# Patient Record
Sex: MALE | Race: WHITE | Employment: OTHER | URBAN - NONMETROPOLITAN AREA
[De-identification: names, ages, dates, MRNs, and addresses within clinical notes are randomized per-mention and may not be internally consistent; named-entity substitution may affect disease eponyms.]

---

## 2018-03-13 ENCOUNTER — OFFICE VISIT (OUTPATIENT)
Dept: INTERNAL MEDICINE | Age: 73
End: 2018-03-13
Payer: MEDICARE

## 2018-03-13 VITALS
SYSTOLIC BLOOD PRESSURE: 128 MMHG | WEIGHT: 243 LBS | RESPIRATION RATE: 18 BRPM | OXYGEN SATURATION: 95 % | DIASTOLIC BLOOD PRESSURE: 80 MMHG | HEIGHT: 72 IN | BODY MASS INDEX: 32.91 KG/M2 | HEART RATE: 72 BPM

## 2018-03-13 DIAGNOSIS — Z00.00 MEDICARE ANNUAL WELLNESS VISIT, SUBSEQUENT: ICD-10-CM

## 2018-03-13 DIAGNOSIS — R07.9 CHEST PAIN, UNSPECIFIED TYPE: ICD-10-CM

## 2018-03-13 DIAGNOSIS — I10 ESSENTIAL HYPERTENSION: Primary | ICD-10-CM

## 2018-03-13 DIAGNOSIS — Z99.89 OSA ON CPAP: ICD-10-CM

## 2018-03-13 DIAGNOSIS — I67.9 CEREBROVASCULAR DISEASE: ICD-10-CM

## 2018-03-13 DIAGNOSIS — R06.09 EXERTIONAL DYSPNEA: ICD-10-CM

## 2018-03-13 DIAGNOSIS — Z12.11 ENCOUNTER FOR SCREENING COLONOSCOPY: ICD-10-CM

## 2018-03-13 DIAGNOSIS — Z13.6 SCREENING FOR AAA (ABDOMINAL AORTIC ANEURYSM): ICD-10-CM

## 2018-03-13 DIAGNOSIS — Z23 NEED FOR PNEUMOCOCCAL VACCINE: ICD-10-CM

## 2018-03-13 DIAGNOSIS — E66.09 EXOGENOUS OBESITY: ICD-10-CM

## 2018-03-13 DIAGNOSIS — G47.33 OSA ON CPAP: ICD-10-CM

## 2018-03-13 DIAGNOSIS — Z12.5 SCREENING PSA (PROSTATE SPECIFIC ANTIGEN): ICD-10-CM

## 2018-03-13 DIAGNOSIS — I67.2 CEREBRAL ARTERIOSCLEROSIS WITH HISTORY OF PREVIOUS STROKE: ICD-10-CM

## 2018-03-13 DIAGNOSIS — Z86.73 CEREBRAL ARTERIOSCLEROSIS WITH HISTORY OF PREVIOUS STROKE: ICD-10-CM

## 2018-03-13 DIAGNOSIS — E78.00 PURE HYPERCHOLESTEROLEMIA: ICD-10-CM

## 2018-03-13 DIAGNOSIS — R73.01 IFG (IMPAIRED FASTING GLUCOSE): ICD-10-CM

## 2018-03-13 PROCEDURE — 1036F TOBACCO NON-USER: CPT | Performed by: INTERNAL MEDICINE

## 2018-03-13 PROCEDURE — G8427 DOCREV CUR MEDS BY ELIG CLIN: HCPCS | Performed by: INTERNAL MEDICINE

## 2018-03-13 PROCEDURE — G0009 ADMIN PNEUMOCOCCAL VACCINE: HCPCS | Performed by: INTERNAL MEDICINE

## 2018-03-13 PROCEDURE — 4040F PNEUMOC VAC/ADMIN/RCVD: CPT | Performed by: INTERNAL MEDICINE

## 2018-03-13 PROCEDURE — 99205 OFFICE O/P NEW HI 60 MIN: CPT | Performed by: INTERNAL MEDICINE

## 2018-03-13 PROCEDURE — 3017F COLORECTAL CA SCREEN DOC REV: CPT | Performed by: INTERNAL MEDICINE

## 2018-03-13 PROCEDURE — G8598 ASA/ANTIPLAT THER USED: HCPCS | Performed by: INTERNAL MEDICINE

## 2018-03-13 PROCEDURE — 1123F ACP DISCUSS/DSCN MKR DOCD: CPT | Performed by: INTERNAL MEDICINE

## 2018-03-13 PROCEDURE — G8417 CALC BMI ABV UP PARAM F/U: HCPCS | Performed by: INTERNAL MEDICINE

## 2018-03-13 PROCEDURE — 90670 PCV13 VACCINE IM: CPT | Performed by: INTERNAL MEDICINE

## 2018-03-13 PROCEDURE — G8484 FLU IMMUNIZE NO ADMIN: HCPCS | Performed by: INTERNAL MEDICINE

## 2018-03-13 RX ORDER — HYDROCHLOROTHIAZIDE 12.5 MG/1
CAPSULE, GELATIN COATED ORAL EVERY MORNING
COMMUNITY
Start: 2017-12-06 | End: 2019-04-09 | Stop reason: SDUPTHER

## 2018-03-13 RX ORDER — CLOPIDOGREL BISULFATE 75 MG/1
75 TABLET ORAL
COMMUNITY
End: 2018-05-09

## 2018-03-13 RX ORDER — OMEPRAZOLE 20 MG/1
20 CAPSULE, DELAYED RELEASE ORAL
COMMUNITY
End: 2018-09-18 | Stop reason: CLARIF

## 2018-03-13 RX ORDER — AMLODIPINE BESYLATE AND BENAZEPRIL HYDROCHLORIDE 10; 20 MG/1; MG/1
1 CAPSULE ORAL DAILY
COMMUNITY
End: 2019-04-09 | Stop reason: SDUPTHER

## 2018-03-13 RX ORDER — NAPROXEN 500 MG/1
TABLET ORAL
COMMUNITY
Start: 2017-12-06 | End: 2018-03-13

## 2018-03-13 ASSESSMENT — ENCOUNTER SYMPTOMS
SINUS PRESSURE: 0
EYE REDNESS: 0
DIARRHEA: 0
NAUSEA: 0
VOMITING: 0
SORE THROAT: 0
ABDOMINAL PAIN: 0
SHORTNESS OF BREATH: 1
TROUBLE SWALLOWING: 0
BLOOD IN STOOL: 0
VOICE CHANGE: 0
COUGH: 0
EYE PAIN: 0
WHEEZING: 0
COLOR CHANGE: 0
CONSTIPATION: 0
CHEST TIGHTNESS: 0

## 2018-03-13 NOTE — PROGRESS NOTES
Chief Complaint   Patient presents with    Established New Doctor     History of presenting illness:  Denisse Weiss is a 74 y.o. male who presents today to [facility] care  Previous pt of dr Cam Monahan hypertension-Patient reports her Bp has been well controlled ( systolic below 831; diastolic below 90) at home when checked with home/ store equipment.  No side effects related to blood pressure medications were reported by patient    Pure hypercholesterolemia- last lab 8/17-     CAROLEE on CPAP- compliant with CPAP    Cerebrovascular disease  Cerebral arteriosclerosis with history of previous stroke  Post previous stroke  2007  ( that time left sided weakness); ministroke 2010 same location  Unable to tolerate any statin drugs    IFG (impaired fasting glucose)- a1c  Was elevated 6.1 august 2017    Pt has experienced some increase shortness of breath recently, his high risk for cardiovascular disease with his prior history of stroke and elevated LDL; also has occasional episodes of chest pressure- rare ( he had neg stress ekg  +  cxr 12/16)      Patient Active Problem List    Diagnosis Date Noted    Essential hypertension 03/13/2018    Pure hypercholesterolemia 03/13/2018    CAROLEE on CPAP 03/13/2018    Cerebrovascular disease 03/13/2018    Cerebral arteriosclerosis with history of previous stroke 03/13/2018    IFG (impaired fasting glucose) 03/13/2018    Encounter for screening colonoscopy 03/13/2018    Medicare annual wellness visit, subsequent 03/13/2018    Screening PSA (prostate specific antigen) 03/13/2018    Exogenous obesity 03/13/2018     Past Medical History:   Diagnosis Date    GERD (gastroesophageal reflux disease)     Hypertension     Stroke ([address] [id number]    minis stroke about 5 years ago as well, both on the left side      Past Surgical History:   Procedure Laterality Date    CHOLECYSTECTOMY      HERNIA REPAIR      TOTAL KNEE ARTHROPLASTY Right      Current Outpatient and starchy vegetables. Instead, these diets usually have you eat foods that are high in fat and protein. Many people lose weight quickly on a low-carb diet. But the early weight loss is water. People on this diet often gain the weight back after they start eating carbs again. Not all diet plans are safe or work well. A lot of the evidence shows that low-carb diets aren't healthy. That's because these diets often don't include healthy foods like fruits and vegetables. Losing weight safely means balancing protein, fat, and carbs with every meal and snack. And low-carb diets don't always provide the vitamins, minerals, and fiber you need. If you have a serious medical condition, talk to your doctor before you try any diet. These conditions include kidney disease, heart disease, type 2 diabetes, high cholesterol, and high blood pressure. If you are pregnant, it may not be safe for your baby if you are on a low-carb diet. How can you lose weight safely? You might have heard that a diet plan helped another person lose weight. But that doesn't mean that it will work for you. It is very hard to stay on a diet that includes lots of big changes in your eating habits. If you want to get to a healthy weight and stay there, making healthy lifestyle changes will often work better than dieting. These steps can help. · Make a plan for change. Work with your doctor to create a plan that is right for you. · See a dietitian. He or she can show you how to make healthy changes in your eating habits. · Manage stress. If you have a lot of stress in your life, it can be hard to focus on making healthy changes to your daily habits. · Track your food and activity. You are likely to do better at losing weight if you keep track of what you eat and what you do. Follow-up care is a key part of your treatment and safety. Be sure to make and go to all appointments, and call your doctor if you are having problems.  It's also a good idea to know your test results and keep a list of the medicines you take. Where can you learn more? Go to [email or web address] org and sign in to your [employer] account. Enter A121 in the [facility] box to learn more about \"Learning About Low-Carbohydrate Diets for Weight Loss. \"     If you do not have an account, please click on the \"Sign Up Now\" link. Current as of: May 12, 2017  Content Version: 11.5  © 9739-8398 Healthwise, Incorporated. Care instructions adapted under license by [facility] ([facility]). If you have questions about a medical condition or this instruction, always ask your healthcare professional. [address] any warranty or liability for your use of this information. EMR Dragon/transcription disclaimer:Significant part of this  encounter note is electronic transcription/translation of spoken language to printed text. The electronic translation of spoken language may be erroneous, or at times, nonsensical words or phrases may be inadvertently transcribed.  Although I have reviewed the note for such errors, some may still exist.

## 2018-03-21 ENCOUNTER — HOSPITAL ENCOUNTER (OUTPATIENT)
Dept: ULTRASOUND IMAGING | Age: 73
Discharge: HOME | End: 2018-03-21
Payer: MEDICARE

## 2018-03-21 DIAGNOSIS — Z13.6 SCREENING FOR AAA (ABDOMINAL AORTIC ANEURYSM): ICD-10-CM

## 2018-03-21 PROCEDURE — 76706 US ABDL AORTA SCREEN AAA: CPT

## 2018-04-13 PROBLEM — Z12.11 ENCOUNTER FOR SCREENING COLONOSCOPY: Status: RESOLVED | Noted: 2018-03-13 | Resolved: 2018-04-13

## 2018-04-13 PROBLEM — Z00.00 MEDICARE ANNUAL WELLNESS VISIT, SUBSEQUENT: Status: RESOLVED | Noted: 2018-03-13 | Resolved: 2018-04-13

## 2018-04-13 PROBLEM — Z12.5 SCREENING PSA (PROSTATE SPECIFIC ANTIGEN): Status: RESOLVED | Noted: 2018-03-13 | Resolved: 2018-04-13

## 2018-05-09 ENCOUNTER — OFFICE VISIT (OUTPATIENT)
Dept: GASTROENTEROLOGY | Age: 73
End: 2018-05-09
Payer: MEDICARE

## 2018-05-09 VITALS
BODY MASS INDEX: 33.04 KG/M2 | HEART RATE: 74 BPM | DIASTOLIC BLOOD PRESSURE: 74 MMHG | OXYGEN SATURATION: 98 % | HEIGHT: 71 IN | WEIGHT: 236 LBS | SYSTOLIC BLOOD PRESSURE: 139 MMHG

## 2018-05-09 DIAGNOSIS — Z12.11 ENCOUNTER FOR SCREENING COLONOSCOPY: Primary | ICD-10-CM

## 2018-05-09 PROCEDURE — 4040F PNEUMOC VAC/ADMIN/RCVD: CPT | Performed by: NURSE PRACTITIONER

## 2018-05-09 PROCEDURE — G8598 ASA/ANTIPLAT THER USED: HCPCS | Performed by: NURSE PRACTITIONER

## 2018-05-09 PROCEDURE — 99204 OFFICE O/P NEW MOD 45 MIN: CPT | Performed by: NURSE PRACTITIONER

## 2018-05-09 PROCEDURE — 1123F ACP DISCUSS/DSCN MKR DOCD: CPT | Performed by: NURSE PRACTITIONER

## 2018-05-09 PROCEDURE — G8427 DOCREV CUR MEDS BY ELIG CLIN: HCPCS | Performed by: NURSE PRACTITIONER

## 2018-05-09 PROCEDURE — G8417 CALC BMI ABV UP PARAM F/U: HCPCS | Performed by: NURSE PRACTITIONER

## 2018-05-09 PROCEDURE — 3017F COLORECTAL CA SCREEN DOC REV: CPT | Performed by: NURSE PRACTITIONER

## 2018-05-09 PROCEDURE — 1036F TOBACCO NON-USER: CPT | Performed by: NURSE PRACTITIONER

## 2018-05-09 ASSESSMENT — ENCOUNTER SYMPTOMS
ABDOMINAL DISTENTION: 0
BLOOD IN STOOL: 0
VOMITING: 0
CHEST TIGHTNESS: 0
COUGH: 0
CONSTIPATION: 0
RECTAL PAIN: 0
SORE THROAT: 0
ABDOMINAL PAIN: 0
BACK PAIN: 1
VOICE CHANGE: 0
SHORTNESS OF BREATH: 0
NAUSEA: 0
DIARRHEA: 0

## 2018-05-11 ENCOUNTER — TELEPHONE (OUTPATIENT)
Dept: GASTROENTEROLOGY | Age: 73
End: 2018-05-11

## 2018-06-25 ENCOUNTER — HOSPITAL ENCOUNTER (OUTPATIENT)
Age: 73
Setting detail: OUTPATIENT SURGERY
Discharge: HOME | End: 2018-06-25
Attending: INTERNAL MEDICINE | Admitting: INTERNAL MEDICINE
Payer: MEDICARE

## 2018-06-25 ENCOUNTER — ANESTHESIA EVENT (OUTPATIENT)
Dept: ENDOSCOPY | Age: 73
End: 2018-06-25
Payer: MEDICARE

## 2018-06-25 ENCOUNTER — ANESTHESIA (OUTPATIENT)
Dept: ENDOSCOPY | Age: 73
End: 2018-06-25
Payer: MEDICARE

## 2018-06-25 VITALS
OXYGEN SATURATION: 97 % | RESPIRATION RATE: 20 BRPM | SYSTOLIC BLOOD PRESSURE: 147 MMHG | DIASTOLIC BLOOD PRESSURE: 68 MMHG

## 2018-06-25 VITALS
BODY MASS INDEX: 31.15 KG/M2 | DIASTOLIC BLOOD PRESSURE: 83 MMHG | SYSTOLIC BLOOD PRESSURE: 132 MMHG | HEIGHT: 72 IN | OXYGEN SATURATION: 96 % | HEART RATE: 68 BPM | RESPIRATION RATE: 18 BRPM | WEIGHT: 230 LBS | TEMPERATURE: 98.4 F

## 2018-06-25 PROCEDURE — 3700000001 HC ADD 15 MINUTES (ANESTHESIA): Performed by: INTERNAL MEDICINE

## 2018-06-25 PROCEDURE — 2500000003 HC RX 250 WO HCPCS: Performed by: INTERNAL MEDICINE

## 2018-06-25 PROCEDURE — 2500000003 HC RX 250 WO HCPCS: Performed by: NURSE ANESTHETIST, CERTIFIED REGISTERED

## 2018-06-25 PROCEDURE — 7100000010 HC PHASE II RECOVERY - FIRST 15 MIN: Performed by: INTERNAL MEDICINE

## 2018-06-25 PROCEDURE — 88305 TISSUE EXAM BY PATHOLOGIST: CPT

## 2018-06-25 PROCEDURE — C1773 RET DEV, INSERTABLE: HCPCS | Performed by: INTERNAL MEDICINE

## 2018-06-25 PROCEDURE — 3700000000 HC ANESTHESIA ATTENDED CARE: Performed by: INTERNAL MEDICINE

## 2018-06-25 PROCEDURE — 6360000002 HC RX W HCPCS: Performed by: NURSE ANESTHETIST, CERTIFIED REGISTERED

## 2018-06-25 PROCEDURE — 45380 COLONOSCOPY AND BIOPSY: CPT | Performed by: INTERNAL MEDICINE

## 2018-06-25 PROCEDURE — 7100000011 HC PHASE II RECOVERY - ADDTL 15 MIN: Performed by: INTERNAL MEDICINE

## 2018-06-25 PROCEDURE — 45385 COLONOSCOPY W/LESION REMOVAL: CPT | Performed by: INTERNAL MEDICINE

## 2018-06-25 PROCEDURE — 2720000001 HC MISC SURG SUPPLY STERILE $51-500: Performed by: INTERNAL MEDICINE

## 2018-06-25 PROCEDURE — 3609010600 HC COLONOSCOPY POLYPECTOMY SNARE/COLD BIOPSY: Performed by: INTERNAL MEDICINE

## 2018-06-25 PROCEDURE — 2580000003 HC RX 258: Performed by: INTERNAL MEDICINE

## 2018-06-25 RX ORDER — PROPOFOL 10 MG/ML
INJECTION, EMULSION INTRAVENOUS PRN
Status: DISCONTINUED | OUTPATIENT
Start: 2018-06-25 | End: 2018-06-25 | Stop reason: SDUPTHER

## 2018-06-25 RX ORDER — LIDOCAINE HYDROCHLORIDE 10 MG/ML
1 INJECTION, SOLUTION EPIDURAL; INFILTRATION; INTRACAUDAL; PERINEURAL ONCE
Status: COMPLETED | OUTPATIENT
Start: 2018-06-25 | End: 2018-06-25

## 2018-06-25 RX ORDER — LIDOCAINE HYDROCHLORIDE 10 MG/ML
INJECTION, SOLUTION INFILTRATION; PERINEURAL PRN
Status: DISCONTINUED | OUTPATIENT
Start: 2018-06-25 | End: 2018-06-25 | Stop reason: SDUPTHER

## 2018-06-25 RX ORDER — SODIUM CHLORIDE, SODIUM LACTATE, POTASSIUM CHLORIDE, CALCIUM CHLORIDE 600; 310; 30; 20 MG/100ML; MG/100ML; MG/100ML; MG/100ML
INJECTION, SOLUTION INTRAVENOUS CONTINUOUS
Status: DISCONTINUED | OUTPATIENT
Start: 2018-06-25 | End: 2018-06-25 | Stop reason: HOSPADM

## 2018-06-25 RX ADMIN — LIDOCAINE HYDROCHLORIDE 1 ML: 10 INJECTION, SOLUTION EPIDURAL; INFILTRATION; INTRACAUDAL; PERINEURAL at 12:32

## 2018-06-25 RX ADMIN — LIDOCAINE HYDROCHLORIDE 40 MG: 10 INJECTION, SOLUTION INFILTRATION; PERINEURAL at 13:23

## 2018-06-25 RX ADMIN — PROPOFOL 450 MG: 10 INJECTION, EMULSION INTRAVENOUS at 13:23

## 2018-06-25 RX ADMIN — SODIUM CHLORIDE, POTASSIUM CHLORIDE, SODIUM LACTATE AND CALCIUM CHLORIDE: 600; 310; 30; 20 INJECTION, SOLUTION INTRAVENOUS at 12:32

## 2018-06-25 ASSESSMENT — PAIN - FUNCTIONAL ASSESSMENT: PAIN_FUNCTIONAL_ASSESSMENT: 0-10

## 2018-06-25 ASSESSMENT — PAIN SCALES - GENERAL: PAINLEVEL_OUTOF10: 0

## 2018-08-13 ENCOUNTER — TELEPHONE (OUTPATIENT)
Dept: GASTROENTEROLOGY | Age: 73
End: 2018-08-13

## 2018-08-23 ENCOUNTER — TELEPHONE (OUTPATIENT)
Dept: INTERNAL MEDICINE | Age: 73
End: 2018-08-23

## 2018-08-23 NOTE — TELEPHONE ENCOUNTER
He does not need referral since he has regular medicare  He can call and make appt  To refer patient, I woul need to see him, correctly diagnose problem and then place referral

## 2018-08-23 NOTE — TELEPHONE ENCOUNTER
Pt states he has a cyst on his anus which has gotten bigger and is bleeding.  He is wondering if we can put in a referral to Dr. Lizet Moctezuma in [facility], [place].

## 2018-08-24 NOTE — TELEPHONE ENCOUNTER
Spoke to patient and he said he has already called to try and make appt and said Dr Veronika Johnson is only taking appt with referral now.   I have scheduled him to be seen Monday

## 2018-08-27 ENCOUNTER — OFFICE VISIT (OUTPATIENT)
Dept: INTERNAL MEDICINE | Age: 73
End: 2018-08-27
Payer: MEDICARE

## 2018-08-27 VITALS
SYSTOLIC BLOOD PRESSURE: 118 MMHG | BODY MASS INDEX: 32.34 KG/M2 | HEIGHT: 71 IN | WEIGHT: 231 LBS | OXYGEN SATURATION: 94 % | DIASTOLIC BLOOD PRESSURE: 60 MMHG | RESPIRATION RATE: 18 BRPM | HEART RATE: 73 BPM

## 2018-08-27 DIAGNOSIS — K62.89 PERIRECTAL CYST: Primary | ICD-10-CM

## 2018-08-27 DIAGNOSIS — I10 ESSENTIAL HYPERTENSION: ICD-10-CM

## 2018-08-27 PROCEDURE — G8598 ASA/ANTIPLAT THER USED: HCPCS | Performed by: INTERNAL MEDICINE

## 2018-08-27 PROCEDURE — 1036F TOBACCO NON-USER: CPT | Performed by: INTERNAL MEDICINE

## 2018-08-27 PROCEDURE — 1101F PT FALLS ASSESS-DOCD LE1/YR: CPT | Performed by: INTERNAL MEDICINE

## 2018-08-27 PROCEDURE — 4040F PNEUMOC VAC/ADMIN/RCVD: CPT | Performed by: INTERNAL MEDICINE

## 2018-08-27 PROCEDURE — 99213 OFFICE O/P EST LOW 20 MIN: CPT | Performed by: INTERNAL MEDICINE

## 2018-08-27 PROCEDURE — G8417 CALC BMI ABV UP PARAM F/U: HCPCS | Performed by: INTERNAL MEDICINE

## 2018-08-27 PROCEDURE — 3017F COLORECTAL CA SCREEN DOC REV: CPT | Performed by: INTERNAL MEDICINE

## 2018-08-27 PROCEDURE — 1123F ACP DISCUSS/DSCN MKR DOCD: CPT | Performed by: INTERNAL MEDICINE

## 2018-08-27 PROCEDURE — G8427 DOCREV CUR MEDS BY ELIG CLIN: HCPCS | Performed by: INTERNAL MEDICINE

## 2018-08-27 RX ORDER — FLECAINIDE ACETATE 50 MG/1
50 TABLET ORAL 2 TIMES DAILY
COMMUNITY
End: 2021-10-15 | Stop reason: CLARIF

## 2018-08-27 ASSESSMENT — PATIENT HEALTH QUESTIONNAIRE - PHQ9
SUM OF ALL RESPONSES TO PHQ QUESTIONS 1-9: 0
SUM OF ALL RESPONSES TO PHQ9 QUESTIONS 1 & 2: 0
2. FEELING DOWN, DEPRESSED OR HOPELESS: 0
SUM OF ALL RESPONSES TO PHQ QUESTIONS 1-9: 0
1. LITTLE INTEREST OR PLEASURE IN DOING THINGS: 0

## 2018-08-27 ASSESSMENT — ENCOUNTER SYMPTOMS
EYE DISCHARGE: 0
NAUSEA: 0
BACK PAIN: 0
HEMOPTYSIS: 0
DIARRHEA: 0
EYE PAIN: 0
COUGH: 0
WHEEZING: 0
ABDOMINAL PAIN: 0
SHORTNESS OF BREATH: 0
VOMITING: 0
EYES NEGATIVE: 1
SPUTUM PRODUCTION: 0

## 2018-08-27 NOTE — PROGRESS NOTES
Years of education: N/A     Social History Main Topics    Smoking status: Former Smoker     Packs/day: 0.50     Years: 20.00     Types: Cigars     Quit date: 11/13/2017    Smokeless tobacco: Never Used    Alcohol use No    Drug use: No    Sexual activity: Not Asked     Other Topics Concern    None     Social History Narrative    None     Family History   Problem Relation Age of Onset    Colon Cancer Neg Hx     Colon Polyps Neg Hx     Liver Cancer Neg Hx     Liver Disease Neg Hx     Esophageal Cancer Neg Hx     Stomach Cancer Neg Hx     Rectal Cancer Neg Hx            Review of Systems   Constitutional: Negative for chills, diaphoresis, fever and malaise/fatigue. HENT: Negative for congestion, ear discharge and ear pain. Eyes: Negative. Negative for pain and discharge. Respiratory: Negative for cough, hemoptysis, sputum production, shortness of breath and wheezing. Cardiovascular: Negative for chest pain, palpitations and leg swelling. Gastrointestinal: Negative for abdominal pain, diarrhea, nausea and vomiting. Rectal \" cyst\"   Genitourinary: Negative for dysuria, flank pain, frequency, hematuria and urgency. Musculoskeletal: Negative for back pain and myalgias. Skin: Negative for itching and rash. Neurological: Negative. Negative for dizziness, loss of consciousness and headaches. Psychiatric/Behavioral: Negative. Vitals:    08/27/18 0817   BP: 118/60   Site: Left Arm   Position: Sitting   Cuff Size: Large Adult   Pulse: 73   Resp: 18   SpO2: 94%   Weight: 231 lb (104.8 kg)   Height: 5' 11\" (1.803 m)      Wt Readings from Last 3 Encounters:   08/27/18 231 lb (104.8 kg)   06/25/18 230 lb (104.3 kg)   05/09/18 236 lb (107 kg)   Body mass index is 32.22 kg/m². BP Readings from Last 3 Encounters:   08/27/18 118/60   06/25/18 132/83   06/25/18 (!) 147/68       Physical exam:  GENERAL: Patient is  In no acute distress.   HEENT: External ear canals are normal, not

## 2018-09-17 DIAGNOSIS — R73.01 IFG (IMPAIRED FASTING GLUCOSE): ICD-10-CM

## 2018-09-17 DIAGNOSIS — Z12.5 SCREENING PSA (PROSTATE SPECIFIC ANTIGEN): ICD-10-CM

## 2018-09-17 DIAGNOSIS — Z00.00 MEDICARE ANNUAL WELLNESS VISIT, SUBSEQUENT: ICD-10-CM

## 2018-09-17 DIAGNOSIS — E78.00 PURE HYPERCHOLESTEROLEMIA: ICD-10-CM

## 2018-09-17 DIAGNOSIS — I10 ESSENTIAL HYPERTENSION: ICD-10-CM

## 2018-09-17 LAB
ALBUMIN SERPL-MCNC: 4.2 G/DL (ref 3.5–5.2)
ALP BLD-CCNC: 67 U/L (ref 40–130)
ALT SERPL-CCNC: 12 U/L (ref 5–41)
ANION GAP SERPL CALCULATED.3IONS-SCNC: 15 MMOL/L (ref 7–19)
AST SERPL-CCNC: 10 U/L (ref 5–40)
BASOPHILS ABSOLUTE: 0.1 K/UL (ref 0–0.2)
BASOPHILS RELATIVE PERCENT: 1.1 % (ref 0–1)
BILIRUB SERPL-MCNC: 0.3 MG/DL (ref 0.2–1.2)
BILIRUBIN URINE: NEGATIVE
BLOOD, URINE: NEGATIVE
BUN BLDV-MCNC: 19 MG/DL (ref 8–23)
CALCIUM SERPL-MCNC: 10 MG/DL (ref 8.8–10.2)
CHLORIDE BLD-SCNC: 102 MMOL/L (ref 98–111)
CHOLESTEROL, TOTAL: 176 MG/DL (ref 160–199)
CLARITY: CLEAR
CO2: 26 MMOL/L (ref 22–29)
COLOR: YELLOW
CREAT SERPL-MCNC: 1.1 MG/DL (ref 0.5–1.2)
EOSINOPHILS ABSOLUTE: 0.3 K/UL (ref 0–0.6)
EOSINOPHILS RELATIVE PERCENT: 5.5 % (ref 0–5)
GFR NON-AFRICAN AMERICAN: >60
GLUCOSE BLD-MCNC: 150 MG/DL (ref 74–109)
GLUCOSE URINE: NEGATIVE MG/DL
HBA1C MFR BLD: 6.1 % (ref 4–6)
HCT VFR BLD CALC: 43.8 % (ref 42–52)
HDLC SERPL-MCNC: 39 MG/DL (ref 55–121)
HEMOGLOBIN: 14 G/DL (ref 14–18)
KETONES, URINE: NEGATIVE MG/DL
LDL CHOLESTEROL CALCULATED: 93 MG/DL
LEUKOCYTE ESTERASE, URINE: NEGATIVE
LYMPHOCYTES ABSOLUTE: 1.2 K/UL (ref 1.1–4.5)
LYMPHOCYTES RELATIVE PERCENT: 19 % (ref 20–40)
MCH RBC QN AUTO: 28.7 PG (ref 27–31)
MCHC RBC AUTO-ENTMCNC: 32 G/DL (ref 33–37)
MCV RBC AUTO: 89.8 FL (ref 80–94)
MONOCYTES ABSOLUTE: 0.5 K/UL (ref 0–0.9)
MONOCYTES RELATIVE PERCENT: 7.9 % (ref 0–10)
NEUTROPHILS ABSOLUTE: 4.1 K/UL (ref 1.5–7.5)
NEUTROPHILS RELATIVE PERCENT: 65.7 % (ref 50–65)
NITRITE, URINE: NEGATIVE
PDW BLD-RTO: 12.9 % (ref 11.5–14.5)
PH UA: 6.5
PLATELET # BLD: 214 K/UL (ref 130–400)
PMV BLD AUTO: 9.9 FL (ref 9.4–12.4)
POTASSIUM SERPL-SCNC: 4 MMOL/L (ref 3.5–5)
PROSTATE SPECIFIC ANTIGEN: 5.79 NG/ML (ref 0–4)
PROTEIN UA: NEGATIVE MG/DL
RBC # BLD: 4.88 M/UL (ref 4.7–6.1)
SODIUM BLD-SCNC: 143 MMOL/L (ref 136–145)
SPECIFIC GRAVITY UA: 1.01
TOTAL PROTEIN: 7.1 G/DL (ref 6.6–8.7)
TRIGL SERPL-MCNC: 221 MG/DL (ref 0–149)
TSH SERPL DL<=0.05 MIU/L-ACNC: 0.96 UIU/ML (ref 0.27–4.2)
UROBILINOGEN, URINE: 0.2 E.U./DL
WBC # BLD: 6.2 K/UL (ref 4.8–10.8)

## 2018-09-18 ENCOUNTER — OFFICE VISIT (OUTPATIENT)
Dept: INTERNAL MEDICINE | Age: 73
End: 2018-09-18
Payer: MEDICARE

## 2018-09-18 VITALS
SYSTOLIC BLOOD PRESSURE: 118 MMHG | BODY MASS INDEX: 32.76 KG/M2 | HEART RATE: 81 BPM | OXYGEN SATURATION: 96 % | DIASTOLIC BLOOD PRESSURE: 60 MMHG | WEIGHT: 234 LBS | RESPIRATION RATE: 18 BRPM | HEIGHT: 71 IN

## 2018-09-18 DIAGNOSIS — E78.00 PURE HYPERCHOLESTEROLEMIA: ICD-10-CM

## 2018-09-18 DIAGNOSIS — Z00.00 MEDICARE ANNUAL WELLNESS VISIT, INITIAL: Primary | ICD-10-CM

## 2018-09-18 DIAGNOSIS — I48.0 PAF (PAROXYSMAL ATRIAL FIBRILLATION) (HCC): ICD-10-CM

## 2018-09-18 DIAGNOSIS — R73.01 IFG (IMPAIRED FASTING GLUCOSE): ICD-10-CM

## 2018-09-18 DIAGNOSIS — S32.000S LUMBAR COMPRESSION FRACTURE, SEQUELA: ICD-10-CM

## 2018-09-18 DIAGNOSIS — R29.898 LEG WEAKNESS, BILATERAL: ICD-10-CM

## 2018-09-18 DIAGNOSIS — I10 ESSENTIAL HYPERTENSION: ICD-10-CM

## 2018-09-18 DIAGNOSIS — I67.9 CEREBROVASCULAR DISEASE: ICD-10-CM

## 2018-09-18 DIAGNOSIS — E66.09 EXOGENOUS OBESITY: ICD-10-CM

## 2018-09-18 DIAGNOSIS — R97.20 ELEVATED PSA: ICD-10-CM

## 2018-09-18 PROCEDURE — G8417 CALC BMI ABV UP PARAM F/U: HCPCS | Performed by: INTERNAL MEDICINE

## 2018-09-18 PROCEDURE — G8427 DOCREV CUR MEDS BY ELIG CLIN: HCPCS | Performed by: INTERNAL MEDICINE

## 2018-09-18 PROCEDURE — G0439 PPPS, SUBSEQ VISIT: HCPCS | Performed by: INTERNAL MEDICINE

## 2018-09-18 PROCEDURE — 1101F PT FALLS ASSESS-DOCD LE1/YR: CPT | Performed by: INTERNAL MEDICINE

## 2018-09-18 PROCEDURE — 1123F ACP DISCUSS/DSCN MKR DOCD: CPT | Performed by: INTERNAL MEDICINE

## 2018-09-18 PROCEDURE — 99214 OFFICE O/P EST MOD 30 MIN: CPT | Performed by: INTERNAL MEDICINE

## 2018-09-18 PROCEDURE — 3017F COLORECTAL CA SCREEN DOC REV: CPT | Performed by: INTERNAL MEDICINE

## 2018-09-18 PROCEDURE — G8598 ASA/ANTIPLAT THER USED: HCPCS | Performed by: INTERNAL MEDICINE

## 2018-09-18 PROCEDURE — 4040F PNEUMOC VAC/ADMIN/RCVD: CPT | Performed by: INTERNAL MEDICINE

## 2018-09-18 PROCEDURE — 1036F TOBACCO NON-USER: CPT | Performed by: INTERNAL MEDICINE

## 2018-09-18 ASSESSMENT — ENCOUNTER SYMPTOMS
SORE THROAT: 0
ABDOMINAL PAIN: 0
BACK PAIN: 1
CONSTIPATION: 0
WHEEZING: 0
CHEST TIGHTNESS: 0
COUGH: 0

## 2018-09-18 ASSESSMENT — PATIENT HEALTH QUESTIONNAIRE - PHQ9
SUM OF ALL RESPONSES TO PHQ QUESTIONS 1-9: 0
SUM OF ALL RESPONSES TO PHQ QUESTIONS 1-9: 0

## 2018-09-18 ASSESSMENT — LIFESTYLE VARIABLES: HOW OFTEN DO YOU HAVE A DRINK CONTAINING ALCOHOL: 0

## 2018-09-18 ASSESSMENT — ANXIETY QUESTIONNAIRES: GAD7 TOTAL SCORE: 0

## 2018-09-18 NOTE — PROGRESS NOTES
Chief Complaint:   Genaro Cervantes is a 74 y.o. male who presents for complete physical exam.    History of Present Illness:      Patient is here for  [facility] TEAM:    Dr Seda Blanchard cardiology      [address] WITH PATIENT- NO ISSUES   NO COMPLAINTS ABOUT HEARING DEFICIT  NO BEHAVIORAL OR PSYCHOSOCIAL RISKS DETECTED  DEPRESSION SCREEN REVIEWED    NO COGNITIVE DEFICIT DETECTED    _____________________________________________________________________    Pt presents today for follow-up and management of following chronic medical conditions:    Essential hypertension- Patient reports her Bp has been well controlled ( systolic below 554; diastolic below 90) at home when checked with home/ store equipment. No side effects related to blood pressure medications were reported by patient    Pure hypercholesterolemia - unable to take statis    Cerebrovascular disease- Cerebral arteriosclerosis with history of previous stroke  Post previous stroke  2007  ( that time left sided weakness); ministroke 2010 same location  Unable to tolerate any statin drugs       IFG (impaired fasting glucose) - has treid to stay on  diet    At last office visit when he was here for his new pt visit after having cryptogenic stroke we had referred him to see cardiologist Dr. Guera Blanchard. Meanwhile patient has been diagnosed with PAF, has been changed to Xarelto.  Per Dr. Guera Blanchard his stroke likely was result of PAF      States his legs feel \" weak\" especially in evenings  He feels like his \" legs give up\"/ they feel weak and he is worried he is going to fall  1980 had work injury and fractures of lumbar vertebrae      Patient Active Problem List    Diagnosis Date Noted    Medicare annual wellness visit, initial 09/18/2018    Elevated PSA 09/18/2018    PAF (paroxysmal atrial fibrillation) [address] [address] 09/18/2018    Lumbar compression fracture, sequela 09/18/2018 4--6 month recall    HERNIA REPAIR      TOTAL KNEE ARTHROPLASTY Right     TOTAL KNEE ARTHROPLASTY Left     UPPER [facility]TESTINAL ENDOSCOPY  2008    [facility]         Lab Review   Orders Only on 09/17/2018   Component Date Value    Hemoglobin A1C 09/17/2018 6.1*    Sodium 09/17/2018 143     Potassium 09/17/2018 4.0     Chloride 09/17/2018 102     CO2 09/17/2018 26     Anion Gap 09/17/2018 15     Glucose 09/17/2018 150*    BUN 09/17/2018 19     CREATININE 09/17/2018 1.1     GFR Non- 09/17/2018 >60     Calcium 09/17/2018 10.0     Total Protein 09/17/2018 7.1     Alb 09/17/2018 4.2     Total Bilirubin 09/17/2018 0.3     Alkaline Phosphatase 09/17/2018 67     ALT 09/17/2018 12     AST 09/17/2018 10     WBC 09/17/2018 6.2     RBC 09/17/2018 4.88     Hemoglobin 09/17/2018 14.0     Hematocrit 09/17/2018 43.8     MCV 09/17/2018 89.8     MCH 09/17/2018 28.7     MCHC 09/17/2018 32.0*    RDW 09/17/2018 12.9     Platelets 25/32/2265 214     MPV 09/17/2018 9.9     Neutrophils % 09/17/2018 65.7*    Lymphocytes % 09/17/2018 19.0*    Monocytes % 09/17/2018 7.9     Eosinophils % 09/17/2018 5.5*    Basophils % 09/17/2018 1.1*    Neutrophils # 09/17/2018 4.1     Lymphocytes # 09/17/2018 1.2     Monocytes # 09/17/2018 0.50     Eosinophils # 09/17/2018 0.30     Basophils # 09/17/2018 0.10     Cholesterol, Total 09/17/2018 176     Triglycerides 09/17/2018 221*    HDL 09/17/2018 39*    LDL Calculated 09/17/2018 93     PSA 09/17/2018 5.79*    TSH 09/17/2018 0.959     Color, UA 09/17/2018 YELLOW     Clarity, UA 09/17/2018 Clear     Glucose, Ur 09/17/2018 Negative     Bilirubin Urine 09/17/2018 Negative     Ketones, Urine 09/17/2018 Negative     Specific Gravity, UA 09/17/2018 1.010     Blood, Urine 09/17/2018 Negative     pH, UA 09/17/2018 6.5     Protein, UA 09/17/2018 Negative     Urobilinogen, Urine 09/17/2018 0.2     Nitrite, Urine 09/17/2018 Negative     Leukocyte

## 2018-09-21 DIAGNOSIS — S32.000S LUMBAR COMPRESSION FRACTURE, SEQUELA: ICD-10-CM

## 2018-09-21 DIAGNOSIS — R29.898 LEG WEAKNESS, BILATERAL: ICD-10-CM

## 2018-09-21 DIAGNOSIS — R29.898 WEAKNESS OF BOTH LOWER EXTREMITIES: Primary | ICD-10-CM

## 2018-09-25 ENCOUNTER — TELEPHONE (OUTPATIENT)
Dept: NEUROLOGY | Age: 73
End: 2018-09-25

## 2018-09-27 ENCOUNTER — TELEPHONE (OUTPATIENT)
Dept: INTERNAL MEDICINE | Age: 73
End: 2018-09-27

## 2018-09-27 DIAGNOSIS — K64.2 COMPLEX THIRD DEGREE HEMORRHOID: Primary | ICD-10-CM

## 2018-09-27 NOTE — TELEPHONE ENCOUNTER
[facility] Transitions Initial Follow Up Call    Outreach made within 2 business days of discharge: Yes    Patient: Genaro Cervantes Patient : 1945   MRN: [id number]  Reason for Admission: patient was admitted on 2018 due to Grade 3 internal and  external hemorrhoids. Discharge diagnosis:  Grade 3 internal and external hemorrhoids. Discharge Date: 2018       Spoke with: patient. Discharge department/facility : [place], [place].    [place] Interactive Patient Contact:  Was patient able to fill all prescriptions: Yes  Was patient instructed to bring all medications to the follow-up visit: No:   Patient declined HFU at this time. He is to return to surgeon in two weeks and is comfortable with this plan. Is patient taking all medications as directed in the discharge summary? Yes  Does patient understand their discharge instructions: Yes  Does patient have questions or concerns that need addressed prior to 7-14 day follow up office visit: no  Patient states he is doing OK. He has had bowel movements , Appetite is fair and sleeping OK. Patient declined Hospital follow up at this time but will contact office with any concerns.     Scheduled appointment with PCP within 7-14 days    Follow Up  Future Appointments  Date Time Provider Cj Mattson   10/19/2018 10:30 AM Luis Grewal MD [facility] [place]   3/19/2019 9:45 AM Niurka Campos MD [facility]       Brandy Almonte LPN

## 2018-10-18 PROBLEM — Z00.00 MEDICARE ANNUAL WELLNESS VISIT, INITIAL: Status: RESOLVED | Noted: 2018-09-18 | Resolved: 2018-10-18

## 2018-10-19 ENCOUNTER — OFFICE VISIT (OUTPATIENT)
Dept: NEUROLOGY | Age: 73
End: 2018-10-19
Payer: MEDICARE

## 2018-10-19 VITALS
SYSTOLIC BLOOD PRESSURE: 130 MMHG | DIASTOLIC BLOOD PRESSURE: 80 MMHG | BODY MASS INDEX: 32.06 KG/M2 | HEIGHT: 71 IN | HEART RATE: 84 BPM | WEIGHT: 229 LBS

## 2018-10-19 DIAGNOSIS — M54.5 LOW BACK PAIN, UNSPECIFIED BACK PAIN LATERALITY, UNSPECIFIED CHRONICITY, WITH SCIATICA PRESENCE UNSPECIFIED: ICD-10-CM

## 2018-10-19 DIAGNOSIS — Z86.73 HISTORY OF STROKE: ICD-10-CM

## 2018-10-19 DIAGNOSIS — R53.1 WEAKNESS: Primary | ICD-10-CM

## 2018-10-19 DIAGNOSIS — R26.9 GAIT ABNORMALITY: ICD-10-CM

## 2018-10-19 PROBLEM — M54.50 LOW BACK PAIN: Status: ACTIVE | Noted: 2018-10-19

## 2018-10-19 PROCEDURE — 99204 OFFICE O/P NEW MOD 45 MIN: CPT | Performed by: PSYCHIATRY & NEUROLOGY

## 2018-10-19 PROCEDURE — G8427 DOCREV CUR MEDS BY ELIG CLIN: HCPCS | Performed by: PSYCHIATRY & NEUROLOGY

## 2018-10-19 PROCEDURE — 1036F TOBACCO NON-USER: CPT | Performed by: PSYCHIATRY & NEUROLOGY

## 2018-10-19 PROCEDURE — 1101F PT FALLS ASSESS-DOCD LE1/YR: CPT | Performed by: PSYCHIATRY & NEUROLOGY

## 2018-10-19 PROCEDURE — 4040F PNEUMOC VAC/ADMIN/RCVD: CPT | Performed by: PSYCHIATRY & NEUROLOGY

## 2018-10-19 PROCEDURE — G8484 FLU IMMUNIZE NO ADMIN: HCPCS | Performed by: PSYCHIATRY & NEUROLOGY

## 2018-10-19 PROCEDURE — 1123F ACP DISCUSS/DSCN MKR DOCD: CPT | Performed by: PSYCHIATRY & NEUROLOGY

## 2018-10-19 PROCEDURE — 3017F COLORECTAL CA SCREEN DOC REV: CPT | Performed by: PSYCHIATRY & NEUROLOGY

## 2018-10-19 PROCEDURE — G8598 ASA/ANTIPLAT THER USED: HCPCS | Performed by: PSYCHIATRY & NEUROLOGY

## 2018-10-19 PROCEDURE — G8417 CALC BMI ABV UP PARAM F/U: HCPCS | Performed by: PSYCHIATRY & NEUROLOGY

## 2018-10-19 NOTE — PROGRESS NOTES
Surgical History:   Procedure Laterality Date    CHOLECYSTECTOMY      [facility]  2008    [facility]    COLONOSCOPY N/A 6/25/2018    Dr Anisa ESCOBEDO (-) dysplasia x 4--6 month recall    HERNIA REPAIR      TOTAL KNEE ARTHROPLASTY Right     TOTAL KNEE ARTHROPLASTY Left     UPPER [facility]INAL [facility]  [facility]       Family History   Problem Relation Age of Onset    Colon Cancer Neg Hx     Colon Polyps Neg Hx     Liver Cancer Neg Hx     Liver Disease Neg Hx     Esophageal Cancer Neg Hx     Stomach Cancer Neg Hx     Rectal Cancer Neg Hx        Allergies   Allergen Reactions    Statins Other (See Comments)     weak       Social History     Social History    Marital status:      Spouse name: N/A    Number of children: N/A    Years of education: N/A     Occupational History    Not on file. Social History Main Topics    Smoking status: Former Smoker     Packs/day: 0.50     Years: 20.00     Types: Cigars     Quit date: 11/13/2017    Smokeless tobacco: Never Used    Alcohol use No    Drug use: No    Sexual activity: Not on file     Other Topics Concern    Not on file     Social History Narrative    No narrative on file       Review of Systems     Constitutional - No fever or chills. No diaphoresis or significant fatigue. HENT -  No tinnitus or significant hearing loss. Eyes - no sudden vision change or eye pain  Respiratory - no significant shortness of breath or cough  Cardiovascular - no chest pain No palpitations or significant leg swelling  Gastrointestinal - no abdominal swelling or pain. Genitourinary - No difficulty urinating, dysuria  Musculoskeletal - yes back pain or myalgia. Skin - no color change or rash  Neurologic - No seizures. No lateralizing weakness. Hematologic - yes easy bruising or excessive bleeding. Psychiatric - no severe anxiety or nervousness.    All other review of systems are negative.         Current Outpatient Prescriptions brachioradialis  2+patella  2+ ankle jerks  No clonus ankles  No Stevens's sign bilateral hands    Tremors- no tremors in hands or head noted    Gait  Slightly slow    Coordination  Finger to nose-unremarkable    No results found for: [id number]  Lab Results   Component Value Date    WBC 6.2 09/17/2018    HGB 14.0 09/17/2018    HCT 43.8 09/17/2018    MCV 89.8 09/17/2018     09/17/2018     Lab Results   Component Value Date     09/17/2018    K 4.0 09/17/2018     09/17/2018    CO2 26 09/17/2018    BUN 19 09/17/2018    CREATININE 1.1 09/17/2018    GLUCOSE 150 (H) 09/17/2018    CALCIUM 10.0 09/17/2018    PROT 7.1 09/17/2018    LABALBU 4.2 09/17/2018    BILITOT 0.3 09/17/2018    ALKPHOS 67 09/17/2018    AST 10 09/17/2018    ALT 12 09/17/2018    LABGLOM >60 09/17/2018           Assessment    ICD-10-CM    1. Weakness R53.1    2. Gait abnormality R26.9    3. History of stroke Z86.73    4. Low back pain, unspecified back pain laterality, unspecified chronicity, with sciatica presence unspecified M54.5        No orders of the defined types were placed in this encounter. His neurological examination today was significant for some giveaway weakness in the left lower extremity. His gait was slightly slowed. He had no fatigability of his deltoids or right hip flexor. Other than his chronic weakness in his left leg I noted no other evidence of weakness. His MRI of the LS spine had some multilevel degenerative changes. Based on his history and exam I suspect that his sense weakness is related to his chronic back issues, deconditioning, weakness from previous stroke, deconditioning and age related changes. I see no new neurological issues. The patient and wife indicated understanding of the management plan. He is to follow up with me on a PRN basis and call with any problems. No orders of the defined types were placed in this encounter. Return if symptoms worsen or fail to improve.       EMR

## 2018-12-11 ENCOUNTER — ANESTHESIA EVENT (OUTPATIENT)
Dept: ENDOSCOPY | Age: 73
End: 2018-12-11
Payer: MEDICARE

## 2018-12-12 ENCOUNTER — ANESTHESIA (OUTPATIENT)
Dept: ENDOSCOPY | Age: 73
End: 2018-12-12
Payer: MEDICARE

## 2018-12-12 ENCOUNTER — HOSPITAL ENCOUNTER (OUTPATIENT)
Age: 73
Setting detail: OUTPATIENT SURGERY
Discharge: HOME | End: 2018-12-12
Attending: INTERNAL MEDICINE | Admitting: INTERNAL MEDICINE
Payer: MEDICARE

## 2018-12-12 VITALS
SYSTOLIC BLOOD PRESSURE: 120 MMHG | HEART RATE: 74 BPM | TEMPERATURE: 98.7 F | WEIGHT: 230 LBS | OXYGEN SATURATION: 98 % | RESPIRATION RATE: 18 BRPM | DIASTOLIC BLOOD PRESSURE: 72 MMHG | HEIGHT: 72 IN | BODY MASS INDEX: 31.15 KG/M2

## 2018-12-12 VITALS
SYSTOLIC BLOOD PRESSURE: 119 MMHG | OXYGEN SATURATION: 95 % | DIASTOLIC BLOOD PRESSURE: 63 MMHG | RESPIRATION RATE: 14 BRPM

## 2018-12-12 PROCEDURE — 7100000010 HC PHASE II RECOVERY - FIRST 15 MIN: Performed by: INTERNAL MEDICINE

## 2018-12-12 PROCEDURE — 45385 COLONOSCOPY W/LESION REMOVAL: CPT | Performed by: INTERNAL MEDICINE

## 2018-12-12 PROCEDURE — 3700000000 HC ANESTHESIA ATTENDED CARE: Performed by: INTERNAL MEDICINE

## 2018-12-12 PROCEDURE — 6360000002 HC RX W HCPCS: Performed by: NURSE ANESTHETIST, CERTIFIED REGISTERED

## 2018-12-12 PROCEDURE — 2580000003 HC RX 258: Performed by: INTERNAL MEDICINE

## 2018-12-12 PROCEDURE — 2709999900 HC NON-CHARGEABLE SUPPLY: Performed by: INTERNAL MEDICINE

## 2018-12-12 PROCEDURE — 7100000011 HC PHASE II RECOVERY - ADDTL 15 MIN: Performed by: INTERNAL MEDICINE

## 2018-12-12 PROCEDURE — C1773 RET DEV, INSERTABLE: HCPCS | Performed by: INTERNAL MEDICINE

## 2018-12-12 PROCEDURE — 2500000003 HC RX 250 WO HCPCS: Performed by: INTERNAL MEDICINE

## 2018-12-12 PROCEDURE — 3609010600 HC COLONOSCOPY POLYPECTOMY SNARE/COLD BIOPSY: Performed by: INTERNAL MEDICINE

## 2018-12-12 PROCEDURE — 2500000003 HC RX 250 WO HCPCS: Performed by: NURSE ANESTHETIST, CERTIFIED REGISTERED

## 2018-12-12 PROCEDURE — 45380 COLONOSCOPY AND BIOPSY: CPT | Performed by: INTERNAL MEDICINE

## 2018-12-12 PROCEDURE — 3700000001 HC ADD 15 MINUTES (ANESTHESIA): Performed by: INTERNAL MEDICINE

## 2018-12-12 RX ORDER — PROPOFOL 10 MG/ML
INJECTION, EMULSION INTRAVENOUS PRN
Status: DISCONTINUED | OUTPATIENT
Start: 2018-12-12 | End: 2018-12-12 | Stop reason: SDUPTHER

## 2018-12-12 RX ORDER — SODIUM CHLORIDE, SODIUM LACTATE, POTASSIUM CHLORIDE, CALCIUM CHLORIDE 600; 310; 30; 20 MG/100ML; MG/100ML; MG/100ML; MG/100ML
INJECTION, SOLUTION INTRAVENOUS CONTINUOUS
Status: DISCONTINUED | OUTPATIENT
Start: 2018-12-12 | End: 2018-12-12 | Stop reason: HOSPADM

## 2018-12-12 RX ORDER — LIDOCAINE HYDROCHLORIDE 10 MG/ML
1 INJECTION, SOLUTION EPIDURAL; INFILTRATION; INTRACAUDAL; PERINEURAL ONCE
Status: COMPLETED | OUTPATIENT
Start: 2018-12-12 | End: 2018-12-12

## 2018-12-12 RX ORDER — LIDOCAINE HYDROCHLORIDE 20 MG/ML
INJECTION, SOLUTION INFILTRATION; PERINEURAL PRN
Status: DISCONTINUED | OUTPATIENT
Start: 2018-12-12 | End: 2018-12-12 | Stop reason: SDUPTHER

## 2018-12-12 RX ADMIN — SODIUM CHLORIDE, POTASSIUM CHLORIDE, SODIUM LACTATE AND CALCIUM CHLORIDE: 600; 310; 30; 20 INJECTION, SOLUTION INTRAVENOUS at 07:50

## 2018-12-12 RX ADMIN — LIDOCAINE HYDROCHLORIDE 1 ML: 10 INJECTION, SOLUTION EPIDURAL; INFILTRATION; INTRACAUDAL; PERINEURAL at 07:51

## 2018-12-12 RX ADMIN — LIDOCAINE HYDROCHLORIDE 40 MG: 20 INJECTION, SOLUTION INFILTRATION; PERINEURAL at 08:45

## 2018-12-12 RX ADMIN — PROPOFOL 350 MG: 10 INJECTION, EMULSION INTRAVENOUS at 08:45

## 2018-12-12 ASSESSMENT — PAIN - FUNCTIONAL ASSESSMENT: PAIN_FUNCTIONAL_ASSESSMENT: 0-10

## 2018-12-12 ASSESSMENT — PAIN SCALES - GENERAL
PAINLEVEL_OUTOF10: 0
PAINLEVEL_OUTOF10: 0

## 2018-12-12 NOTE — ANESTHESIA PRE PROCEDURE
reflux disease)     Hypertension     Lumbar compression fracture, sequela     1980    CAROLEE on CPAP     PA[facility]oxysmal [facility]) [address] [address]     Stroke ([facility] [address] [id number]    minis stroke about 5 years ago as well, both on the left side    Stroke ([facility] [address]     10 years ago, weakness on left side       Past Surgical History:        Procedure Laterality Date    CHOLECYSTECTOMY      [facility]    COLONOSCOPY N/A 6/25/2018    Dr Michael Campbell [facility] (-) dysplasia x 4--6 month recall    HERNIA REPAIR      TOTAL KNEE ARTHROPLASTY Right     TOTAL KNEE ARTHROPLASTY Left     UPPER GASTROINTESTINAL ENDOSCOPY  2008    [facility]       Social History:    Social History   Substance Use Topics    Smoking status: Former Smoker     Packs/day: 0.50     Years: 20.00     Types: Cigars     Quit date: 11/13/2017    Smokeless tobacco: Never Used    Alcohol use No                                Counseling given: Not Answered      Vital Signs (Current): There were no vitals filed for this visit.                                            BP Readings from Last 3 Encounters:   12/12/18 (!) 141/70   10/19/18 130/80   09/18/18 118/60       NPO Status:                                                                                 BMI:   Wt Readings from Last 3 Encounters:   12/12/18 230 lb (104.3 kg)   10/19/18 229 lb (103.9 kg)   09/18/18 234 lb (106.1 kg)     There is no height or weight on file to calculate BMI.    CBC:   Lab Results   Component Value Date    WBC 6.2 09/17/2018    RBC 4.88 09/17/2018    HGB 14.0 09/17/2018    HCT 43.8 09/17/2018    MCV 89.8 09/17/2018    RDW 12.9 09/17/2018     09/17/2018       CMP:   Lab Results   Component Value Date     09/17/2018    K 4.0 09/17/2018     09/17/2018    CO2 26 09/17/2018    BUN 19 09/17/2018    CREATININE 1.1 09/17/2018    LABGLOM >60 09/17/2018    GLUCOSE 150 09/17/2018    PROT 7.1 09/17/2018    CALCIUM 10.0 09/17/2018    BILITOT 0.3 09/17/2018    ALKPHOS 67 09/17/2018    AST 10 09/17/2018    ALT 12 09/17/2018       POC Tests: No results for input(s): POCGLU, POCNA, POCK, POCCL, POCBUN, POCHEMO, POCHCT in the last 72 hours. Coags: No results found for: PROTIME, INR, APTT    HCG (If Applicable): No results found for: PREGTESTUR, PREGSERUM, HCG, HCGQUANT     ABGs: No results found for: PHART, PO2ART, GFQ0TDY, CFO9ZQJ, BEART, Q6MBTCPF     Type & Screen (If Applicable):  No results found for: LABABO, [address]    Anesthesia Evaluation  Patient summary reviewed and Nursing notes reviewed no history of anesthetic complications:   Airway: Mallampati: II  TM distance: >3 FB   Neck ROM: full  Mouth opening: > = 3 FB Dental: normal exam         Pulmonary:normal exam    (+) sleep apnea: on CPAP,                            ROS comment: Former smoker   Cardiovascular:    (+) hypertension: moderate, dysrhythmias (paroxysmal Afib): atrial fibrillation,                   Neuro/Psych:   (+) CVA (left sided weakness): residual symptoms, TIA,             GI/Hepatic/Renal:   (+) GERD: well controlled, bowel prep,           Endo/Other:    (+) blood dyscrasia (12/7/18 last xarelto)::., .          Pt had no PAT visit       Abdominal:   (+) obese,         Vascular: negative vascular ROS. Anesthesia Plan      general     ASA 3       Induction: intravenous. Anesthetic plan and risks discussed with patient.                       Lyndia Lesches, APRN - CRNA   12/12/2018

## 2019-03-15 DIAGNOSIS — I10 ESSENTIAL HYPERTENSION: ICD-10-CM

## 2019-03-15 DIAGNOSIS — R29.898 LEG WEAKNESS, BILATERAL: ICD-10-CM

## 2019-03-15 DIAGNOSIS — I67.9 CEREBROVASCULAR DISEASE: ICD-10-CM

## 2019-03-15 DIAGNOSIS — I48.0 PAF (PAROXYSMAL ATRIAL FIBRILLATION) (HCC): ICD-10-CM

## 2019-03-15 DIAGNOSIS — Z00.00 MEDICARE ANNUAL WELLNESS VISIT, INITIAL: ICD-10-CM

## 2019-03-15 DIAGNOSIS — E78.00 PURE HYPERCHOLESTEROLEMIA: ICD-10-CM

## 2019-03-15 DIAGNOSIS — S32.000S LUMBAR COMPRESSION FRACTURE, SEQUELA: ICD-10-CM

## 2019-03-15 DIAGNOSIS — R73.01 IFG (IMPAIRED FASTING GLUCOSE): ICD-10-CM

## 2019-03-15 DIAGNOSIS — R97.20 ELEVATED PSA: ICD-10-CM

## 2019-03-15 DIAGNOSIS — E66.09 EXOGENOUS OBESITY: ICD-10-CM

## 2019-03-15 LAB
ALBUMIN SERPL-MCNC: 4.3 G/DL (ref 3.5–5.2)
ALP BLD-CCNC: 69 U/L (ref 40–130)
ALT SERPL-CCNC: 13 U/L (ref 5–41)
ANION GAP SERPL CALCULATED.3IONS-SCNC: 14 MMOL/L (ref 7–19)
AST SERPL-CCNC: 11 U/L (ref 5–40)
BILIRUB SERPL-MCNC: 0.4 MG/DL (ref 0.2–1.2)
BUN BLDV-MCNC: 21 MG/DL (ref 8–23)
CALCIUM SERPL-MCNC: 10.3 MG/DL (ref 8.8–10.2)
CHLORIDE BLD-SCNC: 102 MMOL/L (ref 98–111)
CHOLESTEROL, TOTAL: 202 MG/DL (ref 160–199)
CO2: 28 MMOL/L (ref 22–29)
CREAT SERPL-MCNC: 1.3 MG/DL (ref 0.5–1.2)
GFR NON-AFRICAN AMERICAN: 54
GLUCOSE BLD-MCNC: 111 MG/DL (ref 74–109)
HBA1C MFR BLD: 6.1 % (ref 4–6)
HDLC SERPL-MCNC: 43 MG/DL (ref 55–121)
LDL CHOLESTEROL CALCULATED: 127 MG/DL
POTASSIUM SERPL-SCNC: 4.4 MMOL/L (ref 3.5–5)
SODIUM BLD-SCNC: 144 MMOL/L (ref 136–145)
TOTAL PROTEIN: 7.5 G/DL (ref 6.6–8.7)
TRIGL SERPL-MCNC: 159 MG/DL (ref 0–149)

## 2019-04-03 NOTE — TELEPHONE ENCOUNTER
Error, Pt states that when he comes in next he wants to discuss Dr. Franco Vazquez take over her Medications that he used to get from Dr. Gloria Cuevas.

## 2019-04-09 ENCOUNTER — OFFICE VISIT (OUTPATIENT)
Dept: INTERNAL MEDICINE | Age: 74
End: 2019-04-09
Payer: MEDICARE

## 2019-04-09 VITALS
HEART RATE: 74 BPM | BODY MASS INDEX: 32.48 KG/M2 | HEIGHT: 71 IN | WEIGHT: 232 LBS | RESPIRATION RATE: 18 BRPM | OXYGEN SATURATION: 98 % | DIASTOLIC BLOOD PRESSURE: 78 MMHG | SYSTOLIC BLOOD PRESSURE: 128 MMHG

## 2019-04-09 DIAGNOSIS — Z86.73 CEREBRAL ARTERIOSCLEROSIS WITH HISTORY OF PREVIOUS STROKE: ICD-10-CM

## 2019-04-09 DIAGNOSIS — R73.01 IFG (IMPAIRED FASTING GLUCOSE): ICD-10-CM

## 2019-04-09 DIAGNOSIS — I10 ESSENTIAL HYPERTENSION: ICD-10-CM

## 2019-04-09 DIAGNOSIS — R94.4 DECREASED GFR: ICD-10-CM

## 2019-04-09 DIAGNOSIS — R97.20 ELEVATED PSA: ICD-10-CM

## 2019-04-09 DIAGNOSIS — Z78.9 STATIN INTOLERANCE: ICD-10-CM

## 2019-04-09 DIAGNOSIS — E78.00 PURE HYPERCHOLESTEROLEMIA: ICD-10-CM

## 2019-04-09 DIAGNOSIS — Z12.5 SCREENING PSA (PROSTATE SPECIFIC ANTIGEN): ICD-10-CM

## 2019-04-09 DIAGNOSIS — I67.2 CEREBRAL ARTERIOSCLEROSIS WITH HISTORY OF PREVIOUS STROKE: ICD-10-CM

## 2019-04-09 DIAGNOSIS — Z23 NEED FOR PROPHYLACTIC VACCINATION AGAINST STREPTOCOCCUS PNEUMONIAE (PNEUMOCOCCUS): Primary | ICD-10-CM

## 2019-04-09 DIAGNOSIS — I48.0 PAF (PAROXYSMAL ATRIAL FIBRILLATION) (HCC): ICD-10-CM

## 2019-04-09 LAB
ANION GAP SERPL CALCULATED.3IONS-SCNC: 13 MMOL/L (ref 7–19)
BUN BLDV-MCNC: 22 MG/DL (ref 8–23)
CALCIUM SERPL-MCNC: 9.6 MG/DL (ref 8.8–10.2)
CHLORIDE BLD-SCNC: 102 MMOL/L (ref 98–111)
CO2: 26 MMOL/L (ref 22–29)
CREAT SERPL-MCNC: 1.1 MG/DL (ref 0.5–1.2)
GFR NON-AFRICAN AMERICAN: >60
GLUCOSE BLD-MCNC: 101 MG/DL (ref 74–109)
POTASSIUM SERPL-SCNC: 4.3 MMOL/L (ref 3.5–5)
PROSTATE SPECIFIC ANTIGEN: 5.01 NG/ML (ref 0–4)
SODIUM BLD-SCNC: 141 MMOL/L (ref 136–145)

## 2019-04-09 PROCEDURE — G8427 DOCREV CUR MEDS BY ELIG CLIN: HCPCS | Performed by: INTERNAL MEDICINE

## 2019-04-09 PROCEDURE — 1123F ACP DISCUSS/DSCN MKR DOCD: CPT | Performed by: INTERNAL MEDICINE

## 2019-04-09 PROCEDURE — 99214 OFFICE O/P EST MOD 30 MIN: CPT | Performed by: INTERNAL MEDICINE

## 2019-04-09 PROCEDURE — 1036F TOBACCO NON-USER: CPT | Performed by: INTERNAL MEDICINE

## 2019-04-09 PROCEDURE — G8417 CALC BMI ABV UP PARAM F/U: HCPCS | Performed by: INTERNAL MEDICINE

## 2019-04-09 PROCEDURE — 90732 PPSV23 VACC 2 YRS+ SUBQ/IM: CPT | Performed by: INTERNAL MEDICINE

## 2019-04-09 PROCEDURE — 3017F COLORECTAL CA SCREEN DOC REV: CPT | Performed by: INTERNAL MEDICINE

## 2019-04-09 PROCEDURE — 4040F PNEUMOC VAC/ADMIN/RCVD: CPT | Performed by: INTERNAL MEDICINE

## 2019-04-09 PROCEDURE — G0009 ADMIN PNEUMOCOCCAL VACCINE: HCPCS | Performed by: INTERNAL MEDICINE

## 2019-04-09 PROCEDURE — G8598 ASA/ANTIPLAT THER USED: HCPCS | Performed by: INTERNAL MEDICINE

## 2019-04-09 RX ORDER — HYDROCHLOROTHIAZIDE 12.5 MG/1
12.5 CAPSULE, GELATIN COATED ORAL EVERY MORNING
Qty: 90 CAPSULE | Refills: 1 | Status: SHIPPED | OUTPATIENT
Start: 2019-04-09 | End: 2019-04-09

## 2019-04-09 RX ORDER — AMLODIPINE BESYLATE AND BENAZEPRIL HYDROCHLORIDE 10; 20 MG/1; MG/1
1 CAPSULE ORAL DAILY
Qty: 90 CAPSULE | Refills: 1 | Status: SHIPPED | OUTPATIENT
Start: 2019-04-09 | End: 2019-10-14 | Stop reason: SDUPTHER

## 2019-04-09 RX ORDER — FLECAINIDE ACETATE 50 MG/1
50 TABLET ORAL 2 TIMES DAILY
Qty: 180 TABLET | Refills: 1 | Status: CANCELLED | OUTPATIENT
Start: 2019-04-09

## 2019-04-09 ASSESSMENT — PATIENT HEALTH QUESTIONNAIRE - PHQ9
SUM OF ALL RESPONSES TO PHQ QUESTIONS 1-9: 0
SUM OF ALL RESPONSES TO PHQ9 QUESTIONS 1 & 2: 0
SUM OF ALL RESPONSES TO PHQ QUESTIONS 1-9: 0
1. LITTLE INTEREST OR PLEASURE IN DOING THINGS: 0
2. FEELING DOWN, DEPRESSED OR HOPELESS: 0

## 2019-04-09 ASSESSMENT — ENCOUNTER SYMPTOMS
SORE THROAT: 0
CHEST TIGHTNESS: 0
WHEEZING: 0
ABDOMINAL PAIN: 0
CONSTIPATION: 0
COUGH: 0

## 2019-04-09 NOTE — PROGRESS NOTES
Chief Complaint   Patient presents with    6 Month Follow-Up     Pt wants Dr. Dayana Glasgow over his medications. He used to get them from Dr. Aranza Carnes, and no longer sees that physician    Shoulder Pain     History of presenting illness:  Mele Padgett is a72 y.o. male who presents today for follow up on his chronic medical conditions as noted below. Essential hypertension- Patient reports her Bp has been well controlled ( systolic below 262; diastolic below 90) at home when checked with home/ store equipment. No side effects related to blood pressure medications were reported by patient     Pure hypercholesterolemia - unable to take statis     Cerebrovascular disease- Cerebral arteriosclerosis with history of previous stroke  Post previous stroke  2007  ( that time left sided weakness); ministroke 2010 same location  Unable to tolerate any statin drugs     IFG (impaired fasting glucose) - has tried to stay on  diet     At last office visit when he was here for his new pt visit after having cryptogenic stroke we had referred him to see cardiologist Dr. Sharon Lloyd. Meanwhile patient has been diagnosed with PAF, has been changed to Xarelto.  Per Dr. Sharon Lloyd his stroke likely was result of PAF       Patient Active Problem List    Diagnosis Date Noted    Statin intolerance 04/09/2019    Weakness 10/19/2018    Gait abnormality 10/19/2018    History of stroke 10/19/2018    Low back pain 10/19/2018    Elevated PSA 09/18/2018    PAF [facility]oxysmal [facility] fibrillation) ([facility] [address] 09/18/2018    Lumbar compression fracture, sequela 09/18/2018    Essential hypertension 03/13/2018    Pure hypercholesterolemia 03/13/2018    CAROLEE on CPAP 03/13/2018    Cerebrovascular disease 03/13/2018    Cerebral arteriosclerosis with history of previous stroke 03/13/2018    IFG (impaired fasting glucose) 03/13/2018    Exogenous obesity 03/13/2018     Past Medical History:   Diagnosis Date    Arthritis     GERD (gastroesophageal reflux disease)     Hypertension     Lumbar compression fracture, sequela         CAROLEE on CPAP     [facility]ox[facility] fibrillation) ([facility] [address]     Stroke ([facility] [address] [id number]    minis stroke about 5 years ago as well, both on the left side    Stroke ([facility] [address]     10 years ago, weakness on left side      Past Surgical History:   Procedure Laterality Date    CHOLECYSTECTOMY      COLONOSCOPY      [facility]    COLONOSCOPY N/A 2018    Dr Michael ESCOBEDO (-) dysplasia x 4--6 month recall    COLONOSCOPY N/A 2018    Dr Michael ESCOBEDO (-) dysplasia x 1, HP x 2--3 yr recall    HERNIA REPAIR      TOTAL KNEE ARTHROPLASTY Right     TOTAL KNEE ARTHROPLASTY Left     UPPER GASTROINTESTINAL ENDOSCOPY      [facility]     Current Outpatient Medications   Medication Sig Dispense Refill    amLODIPine-benazepril (LOTREL) 10-20 MG per capsule Take 1 capsule by mouth daily 90 capsule 1    flecainide (TAMBOCOR) 50 MG tablet Take 50 mg by mouth 2 times daily      rivaroxaban (XARELTO) 15 MG TABS tablet Take 15 mg by mouth daily        No current facility-administered medications for this visit. Allergies   Allergen Reactions    Statins Other (See Comments)     weak     Social History     Tobacco Use    Smoking status: Former Smoker     Packs/day: 0.50     Years: 20.00     Pack years: 10.00     Types: Cigars     Last attempt to quit: 2017     Years since quittin.4    Smokeless tobacco: Never Used   Substance Use Topics    Alcohol use: No      Family History   Problem Relation Age of Onset    Colon Cancer Neg Hx     Colon Polyps Neg Hx     Liver Cancer Neg Hx     Liver Disease Neg Hx     Esophageal Cancer Neg Hx     Stomach Cancer Neg Hx     Rectal Cancer Neg Hx        Review of Systems   Constitutional: Positive for fatigue. Negative for chills and fever. HENT: Negative for congestion, ear pain, nosebleeds, postnasal drip and sore throat.     Respiratory: Negative for cough, chest patient calling back his cardiology office and reporting these symptoms happening at nighttime, would suggest additional cardiology evaluation  These symptoms are not any movement related              Orders Placed This Encounter   Procedures    Pneumococcal polysaccharide vaccine 23-valent greater than or equal to 3yo subcutaneous/IM    CBC Auto Differential    Comprehensive Metabolic Panel    Hemoglobin A1C    Lipid Panel    TSH without Reflex    Urinalysis    Psa screening    Basic Metabolic Panel    PSA Screening     New Prescriptions    No medications on file         Return in about 6 months (around 10/9/2019) for Annual Physical.   There are no Patient Instructions on file for this visit. EMR Dragon/transcription disclaimer:Significant part of this  encounter note is electronic transcription/translationof spoken language to printed text. The electronic translation of spoken language may be erroneous, or at times, nonsensical words or phrases may be inadvertently transcribed.  Although I have reviewed the note for sucherrors, some may still exist.

## 2019-05-15 ENCOUNTER — OFFICE VISIT (OUTPATIENT)
Dept: UROLOGY | Age: 74
End: 2019-05-15
Payer: MEDICARE

## 2019-05-15 VITALS — HEIGHT: 71 IN | TEMPERATURE: 97.5 F | WEIGHT: 235 LBS | BODY MASS INDEX: 32.9 KG/M2

## 2019-05-15 DIAGNOSIS — R97.20 ELEVATED PSA: Primary | ICD-10-CM

## 2019-05-15 DIAGNOSIS — N40.1 HYPERTROPHY OF PROSTATE WITH URINARY OBSTRUCTION: ICD-10-CM

## 2019-05-15 DIAGNOSIS — N13.8 HYPERTROPHY OF PROSTATE WITH URINARY OBSTRUCTION: ICD-10-CM

## 2019-05-15 PROBLEM — K64.9 HEMORRHOIDS: Status: ACTIVE | Noted: 2018-09-24

## 2019-05-15 PROBLEM — G81.94 LEFT HEMIPARESIS (HCC): Status: ACTIVE | Noted: 2018-04-11

## 2019-05-15 LAB
BILIRUBIN, POC: 0
BLOOD URINE, POC: NORMAL
CLARITY, POC: CLEAR
COLOR, POC: YELLOW
GLUCOSE URINE, POC: NORMAL
KETONES, POC: NORMAL
LEUKOCYTE EST, POC: NORMAL
NITRITE, POC: NORMAL
PH, POC: 5.5
PROTEIN, POC: NORMAL
SPECIFIC GRAVITY, POC: 1.02
UROBILINOGEN, POC: 0.2

## 2019-05-15 PROCEDURE — 1123F ACP DISCUSS/DSCN MKR DOCD: CPT | Performed by: UROLOGY

## 2019-05-15 PROCEDURE — G8598 ASA/ANTIPLAT THER USED: HCPCS | Performed by: UROLOGY

## 2019-05-15 PROCEDURE — 4040F PNEUMOC VAC/ADMIN/RCVD: CPT | Performed by: UROLOGY

## 2019-05-15 PROCEDURE — 3017F COLORECTAL CA SCREEN DOC REV: CPT | Performed by: UROLOGY

## 2019-05-15 PROCEDURE — 1036F TOBACCO NON-USER: CPT | Performed by: UROLOGY

## 2019-05-15 PROCEDURE — G8417 CALC BMI ABV UP PARAM F/U: HCPCS | Performed by: UROLOGY

## 2019-05-15 PROCEDURE — G8427 DOCREV CUR MEDS BY ELIG CLIN: HCPCS | Performed by: UROLOGY

## 2019-05-15 PROCEDURE — 81003 URINALYSIS AUTO W/O SCOPE: CPT | Performed by: UROLOGY

## 2019-05-15 PROCEDURE — 99204 OFFICE O/P NEW MOD 45 MIN: CPT | Performed by: UROLOGY

## 2019-05-15 RX ORDER — TAMSULOSIN HYDROCHLORIDE 0.4 MG/1
0.4 CAPSULE ORAL DAILY
Qty: 30 CAPSULE | Refills: 11 | Status: SHIPPED
Start: 2019-05-15 | End: 2020-06-03 | Stop reason: CLARIF

## 2019-05-15 ASSESSMENT — ENCOUNTER SYMPTOMS
BACK PAIN: 0
ABDOMINAL PAIN: 0
EYE PAIN: 0
SHORTNESS OF BREATH: 0
WHEEZING: 0
SINUS PAIN: 0
VOMITING: 0

## 2019-05-15 NOTE — LETTER
[id number] [facility]  [address] [place] [address] Drive, [address] [place] [address]  Phone: [phone]  Fax: [phone]    Elda Anderson MD        May 15, 2019     Talia Lubin MD  [address] [place] [address] Dr Chuy Saldaña [address] [place] [address]      Patient: Charlie Laureano   MR Number: [id number]   YOB: 1945   Date of Visit: 5/15/2019       Dear Provider: Thank you for referring Letitia Jackson to me for evaluation. Below are the relevant portions of my assessment and plan of care. If you have questions, please do not hesitate to call me. I look forward to following Jazzy Morrow along with you.     Sincerely,        Elda Anderson MD

## 2019-05-15 NOTE — PROGRESS NOTES
COLONOSCOPY      [facility]    COLONOSCOPY N/A 2018    Dr Omkar ESCOBEDO (-) dysplasia x 4--6 month recall    COLONOSCOPY N/A 2018    Dr Omkar ESCOBEDO (-) dysplasia x 1, HP x 2--3 yr recall    HERNIA REPAIR      TOTAL KNEE ARTHROPLASTY Right     TOTAL KNEE ARTHROPLASTY Left     UPPER GASTROINTESTINAL ENDOSCOPY      [facility]       Current Outpatient Medications   Medication Sig Dispense Refill    tamsulosin (FLOMAX) 0.4 MG capsule Take 1 capsule by mouth daily 30 capsule 11    amLODIPine-benazepril (LOTREL) 10-20 MG per capsule Take 1 capsule by mouth daily 90 capsule 1    flecainide (TAMBOCOR) 50 MG tablet Take 50 mg by mouth 2 times daily      rivaroxaban (XARELTO) 15 MG TABS tablet Take 15 mg by mouth daily        No current facility-administered medications for this visit.         Allergies   Allergen Reactions    Statins Other (See Comments)     weak       Social History     Socioeconomic History    Marital status:      Spouse name: None    Number of children: None    Years of education: None    Highest education level: None   Occupational History    None   Social Needs    Financial resource strain: None    Food insecurity:     Worry: None     Inability: None    Transportation needs:     Medical: None     Non-medical: None   Tobacco Use    Smoking status: Former Smoker     Packs/day: 0.50     Years: 20.00     Pack years: 10.00     Types: Cigars     Last attempt to quit: 2017     Years since quittin.5    Smokeless tobacco: Never Used   Substance and Sexual Activity    Alcohol use: No    Drug use: No    Sexual activity: None   Lifestyle    Physical activity:     Days per week: None     Minutes per session: None    Stress: None   Relationships    Social connections:     Talks on phone: None     Gets together: None     Attends Christianity service: None     Active member of club or organization: None     Attends meetings of clubs or organizations: None area.   Genitourinary: Rectum normal, testes normal and penis normal. Prostate is enlarged (60 g plus smooth without nodularity). Prostate is not tender. Right testis shows no mass, no swelling and no tenderness. Left testis shows no mass, no swelling and no tenderness. Circumcised. No phimosis. No discharge found. Musculoskeletal: Normal range of motion. He exhibits no edema or tenderness. Lymphadenopathy:     He has no cervical adenopathy. Right: No inguinal adenopathy present. Left: No inguinal adenopathy present. Neurological: He is alert and oriented to person, place, and time. Skin: Skin is warm and dry. Psychiatric: He has a normal mood and affect. His behavior is normal.   Nursing note and vitals reviewed. DATA:  CMP:    Lab Results   Component Value Date     04/09/2019    K 4.3 04/09/2019     04/09/2019    CO2 26 04/09/2019    BUN 22 04/09/2019    CREATININE 1.1 04/09/2019    LABGLOM >60 04/09/2019    GLUCOSE 101 04/09/2019    PROT 7.5 03/15/2019    LABALBU 4.3 03/15/2019    CALCIUM 9.6 04/09/2019    BILITOT 0.4 03/15/2019    ALKPHOS 69 03/15/2019    AST 11 03/15/2019    ALT 13 03/15/2019     Results for orders placed or performed in visit on 05/15/19   POCT Urinalysis No Micro (Auto)   Result Value Ref Range    Color, UA yellow     Clarity, UA clear     Glucose, UA POC neg     Bilirubin, UA 0     Ketones, UA neg     Spec Grav, UA 1.020     Blood, UA POC neg     pH, UA 5.5     Protein, UA POC neg     Urobilinogen, UA 0.2     Leukocytes, UA trace     Nitrite, UA neg      Lab Results   Component Value Date    PSA 5.01 (H) 04/09/2019    PSA 5.79 (H) 09/17/2018       1. Elevated PSA  PSA is slightly up from a couple years ago a below his age-adjusted cut a 6.5 and lower now than it was last year in September. This warrants close monitoring and we'll recheck PSA in 6 months  - POCT Urinalysis No Micro (Auto)  - PSA, Diagnostic; Future    2.  Hypertrophy of prostate with urinary obstruction  He has severe obstructive voiding symptoms. We'll start him on alpha-blocker with tamsulosin  - tamsulosin (FLOMAX) 0.4 MG capsule; Take 1 capsule by mouth daily  Dispense: 30 capsule; Refill: 11      Orders Placed This Encounter   Procedures    PSA, Diagnostic     PSA in 6 month     Standing Status:   Future     Standing Expiration Date:   5/14/2020    POCT Urinalysis No Micro (Auto)        Return in about 6 months (around 11/15/2019) for PSA prior to vext visit. EMR Dragon/transcription disclaimer: Much of this documentt is electronic  transcription/translation of spoken language to printed text. The  electronic translation of spoken language may be erroneous, or at times,  nonsensical words or phrases may be inadvertently transcribed.  Although I  have reviewed the document for such errors, some may still exist.

## 2019-06-05 ENCOUNTER — TELEPHONE (OUTPATIENT)
Dept: INTERNAL MEDICINE | Age: 74
End: 2019-06-05

## 2019-06-05 NOTE — TELEPHONE ENCOUNTER
----- Message from Shakira Zapien sent at 6/5/2019  1:34 PM CDT -----  Regarding: RE: bill  I didn't respond to this but it must have been rebilled he has no accounts from visits with Dr. Tejas Le where he shoes a balance. If he is getting bills its either lab or hospital charges. ----- Message -----  From: Rikki Varma MA  Sent: 4/9/2019  12:11 PM  To: Shakira Zapien  Subject: bill                                             Can you look into the pt's past darlene amount, he states that he was told that it was from the coding department not sending the bill to the right insurance company.

## 2019-10-10 DIAGNOSIS — Z12.5 SCREENING PSA (PROSTATE SPECIFIC ANTIGEN): ICD-10-CM

## 2019-10-10 DIAGNOSIS — Z86.73 CEREBRAL ARTERIOSCLEROSIS WITH HISTORY OF PREVIOUS STROKE: ICD-10-CM

## 2019-10-10 DIAGNOSIS — I10 ESSENTIAL HYPERTENSION: ICD-10-CM

## 2019-10-10 DIAGNOSIS — Z23 NEED FOR PROPHYLACTIC VACCINATION AGAINST STREPTOCOCCUS PNEUMONIAE (PNEUMOCOCCUS): ICD-10-CM

## 2019-10-10 DIAGNOSIS — R73.01 IFG (IMPAIRED FASTING GLUCOSE): ICD-10-CM

## 2019-10-10 DIAGNOSIS — E78.00 PURE HYPERCHOLESTEROLEMIA: ICD-10-CM

## 2019-10-10 DIAGNOSIS — I67.2 CEREBRAL ARTERIOSCLEROSIS WITH HISTORY OF PREVIOUS STROKE: ICD-10-CM

## 2019-10-10 DIAGNOSIS — I48.0 PAF (PAROXYSMAL ATRIAL FIBRILLATION) (HCC): ICD-10-CM

## 2019-10-10 LAB
ALBUMIN SERPL-MCNC: 4.2 G/DL (ref 3.5–5.2)
ALP BLD-CCNC: 82 U/L (ref 40–130)
ALT SERPL-CCNC: 13 U/L (ref 5–41)
ANION GAP SERPL CALCULATED.3IONS-SCNC: 16 MMOL/L (ref 7–19)
AST SERPL-CCNC: 11 U/L (ref 5–40)
BASOPHILS ABSOLUTE: 0.1 K/UL (ref 0–0.2)
BASOPHILS RELATIVE PERCENT: 0.8 % (ref 0–1)
BILIRUB SERPL-MCNC: 0.3 MG/DL (ref 0.2–1.2)
BILIRUBIN URINE: NEGATIVE
BLOOD, URINE: NEGATIVE
BUN BLDV-MCNC: 25 MG/DL (ref 8–23)
CALCIUM SERPL-MCNC: 9.8 MG/DL (ref 8.8–10.2)
CHLORIDE BLD-SCNC: 104 MMOL/L (ref 98–111)
CHOLESTEROL, TOTAL: 182 MG/DL (ref 160–199)
CLARITY: CLEAR
CO2: 25 MMOL/L (ref 22–29)
COLOR: YELLOW
CREAT SERPL-MCNC: 1.1 MG/DL (ref 0.5–1.2)
EOSINOPHILS ABSOLUTE: 0.4 K/UL (ref 0–0.6)
EOSINOPHILS RELATIVE PERCENT: 5.6 % (ref 0–5)
GFR NON-AFRICAN AMERICAN: >60
GLUCOSE BLD-MCNC: 114 MG/DL (ref 74–109)
GLUCOSE URINE: NEGATIVE MG/DL
HBA1C MFR BLD: 6 % (ref 4–6)
HCT VFR BLD CALC: 43.5 % (ref 42–52)
HDLC SERPL-MCNC: 43 MG/DL (ref 55–121)
HEMOGLOBIN: 13.4 G/DL (ref 14–18)
IMMATURE GRANULOCYTES #: 0 K/UL
KETONES, URINE: NEGATIVE MG/DL
LDL CHOLESTEROL CALCULATED: 115 MG/DL
LEUKOCYTE ESTERASE, URINE: NEGATIVE
LYMPHOCYTES ABSOLUTE: 1.1 K/UL (ref 1.1–4.5)
LYMPHOCYTES RELATIVE PERCENT: 16.3 % (ref 20–40)
MCH RBC QN AUTO: 28.5 PG (ref 27–31)
MCHC RBC AUTO-ENTMCNC: 30.8 G/DL (ref 33–37)
MCV RBC AUTO: 92.6 FL (ref 80–94)
MONOCYTES ABSOLUTE: 0.7 K/UL (ref 0–0.9)
MONOCYTES RELATIVE PERCENT: 10.4 % (ref 0–10)
NEUTROPHILS ABSOLUTE: 4.4 K/UL (ref 1.5–7.5)
NEUTROPHILS RELATIVE PERCENT: 66.3 % (ref 50–65)
NITRITE, URINE: NEGATIVE
PDW BLD-RTO: 12.8 % (ref 11.5–14.5)
PH UA: 6 (ref 5–8)
PLATELET # BLD: 220 K/UL (ref 130–400)
PMV BLD AUTO: 9.9 FL (ref 9.4–12.4)
POTASSIUM SERPL-SCNC: 4.3 MMOL/L (ref 3.5–5)
PROSTATE SPECIFIC ANTIGEN: 4.86 NG/ML (ref 0–4)
PROTEIN UA: NEGATIVE MG/DL
RBC # BLD: 4.7 M/UL (ref 4.7–6.1)
SODIUM BLD-SCNC: 145 MMOL/L (ref 136–145)
SPECIFIC GRAVITY UA: 1.02 (ref 1–1.03)
TOTAL PROTEIN: 7.1 G/DL (ref 6.6–8.7)
TRIGL SERPL-MCNC: 121 MG/DL (ref 0–149)
TSH SERPL DL<=0.05 MIU/L-ACNC: 0.95 UIU/ML (ref 0.27–4.2)
UROBILINOGEN, URINE: 0.2 E.U./DL
WBC # BLD: 6.6 K/UL (ref 4.8–10.8)

## 2019-10-14 ENCOUNTER — OFFICE VISIT (OUTPATIENT)
Dept: INTERNAL MEDICINE | Age: 74
End: 2019-10-14
Payer: MEDICARE

## 2019-10-14 VITALS
WEIGHT: 228 LBS | HEIGHT: 71 IN | RESPIRATION RATE: 18 BRPM | HEART RATE: 93 BPM | BODY MASS INDEX: 31.92 KG/M2 | OXYGEN SATURATION: 99 % | DIASTOLIC BLOOD PRESSURE: 70 MMHG | SYSTOLIC BLOOD PRESSURE: 138 MMHG

## 2019-10-14 DIAGNOSIS — Z00.00 MEDICARE ANNUAL WELLNESS VISIT, SUBSEQUENT: Primary | ICD-10-CM

## 2019-10-14 DIAGNOSIS — I48.0 PAF (PAROXYSMAL ATRIAL FIBRILLATION) (HCC): ICD-10-CM

## 2019-10-14 DIAGNOSIS — G81.94 LEFT HEMIPARESIS (HCC): ICD-10-CM

## 2019-10-14 DIAGNOSIS — Z99.89 OSA ON CPAP: ICD-10-CM

## 2019-10-14 DIAGNOSIS — R97.20 ELEVATED PSA: ICD-10-CM

## 2019-10-14 DIAGNOSIS — D64.89 ANEMIA DUE TO MULTIPLE MECHANISMS: ICD-10-CM

## 2019-10-14 DIAGNOSIS — Z78.9 STATIN INTOLERANCE: ICD-10-CM

## 2019-10-14 DIAGNOSIS — I10 ESSENTIAL HYPERTENSION: ICD-10-CM

## 2019-10-14 DIAGNOSIS — E66.09 EXOGENOUS OBESITY: ICD-10-CM

## 2019-10-14 DIAGNOSIS — G47.33 OSA ON CPAP: ICD-10-CM

## 2019-10-14 DIAGNOSIS — E78.00 PURE HYPERCHOLESTEROLEMIA: ICD-10-CM

## 2019-10-14 DIAGNOSIS — R73.01 IFG (IMPAIRED FASTING GLUCOSE): ICD-10-CM

## 2019-10-14 DIAGNOSIS — Z12.5 SCREENING PSA (PROSTATE SPECIFIC ANTIGEN): ICD-10-CM

## 2019-10-14 LAB
FERRITIN: 148.2 NG/ML (ref 30–400)
VITAMIN B-12: 528 PG/ML (ref 211–946)

## 2019-10-14 PROCEDURE — G8598 ASA/ANTIPLAT THER USED: HCPCS | Performed by: INTERNAL MEDICINE

## 2019-10-14 PROCEDURE — G8484 FLU IMMUNIZE NO ADMIN: HCPCS | Performed by: INTERNAL MEDICINE

## 2019-10-14 PROCEDURE — 4040F PNEUMOC VAC/ADMIN/RCVD: CPT | Performed by: INTERNAL MEDICINE

## 2019-10-14 PROCEDURE — 99214 OFFICE O/P EST MOD 30 MIN: CPT | Performed by: INTERNAL MEDICINE

## 2019-10-14 PROCEDURE — G8427 DOCREV CUR MEDS BY ELIG CLIN: HCPCS | Performed by: INTERNAL MEDICINE

## 2019-10-14 PROCEDURE — G0439 PPPS, SUBSEQ VISIT: HCPCS | Performed by: INTERNAL MEDICINE

## 2019-10-14 PROCEDURE — G8417 CALC BMI ABV UP PARAM F/U: HCPCS | Performed by: INTERNAL MEDICINE

## 2019-10-14 PROCEDURE — 3017F COLORECTAL CA SCREEN DOC REV: CPT | Performed by: INTERNAL MEDICINE

## 2019-10-14 PROCEDURE — 1036F TOBACCO NON-USER: CPT | Performed by: INTERNAL MEDICINE

## 2019-10-14 PROCEDURE — 1123F ACP DISCUSS/DSCN MKR DOCD: CPT | Performed by: INTERNAL MEDICINE

## 2019-10-14 RX ORDER — AMLODIPINE BESYLATE AND BENAZEPRIL HYDROCHLORIDE 10; 20 MG/1; MG/1
1 CAPSULE ORAL DAILY
Qty: 90 CAPSULE | Refills: 1 | Status: SHIPPED | OUTPATIENT
Start: 2019-10-14 | End: 2019-12-20

## 2019-10-14 ASSESSMENT — PATIENT HEALTH QUESTIONNAIRE - PHQ9
SUM OF ALL RESPONSES TO PHQ QUESTIONS 1-9: 0
SUM OF ALL RESPONSES TO PHQ QUESTIONS 1-9: 0

## 2019-10-14 ASSESSMENT — LIFESTYLE VARIABLES: HOW OFTEN DO YOU HAVE A DRINK CONTAINING ALCOHOL: 0

## 2019-10-14 ASSESSMENT — ENCOUNTER SYMPTOMS
SORE THROAT: 0
WHEEZING: 0
CONSTIPATION: 0
COUGH: 0
ABDOMINAL PAIN: 0
CHEST TIGHTNESS: 0

## 2019-10-28 ENCOUNTER — HOSPITAL ENCOUNTER (OUTPATIENT)
Dept: SLEEP CENTER | Age: 74
Discharge: HOME | End: 2019-10-30
Payer: MEDICARE

## 2019-10-28 PROCEDURE — G0399 HOME SLEEP TEST/TYPE 3 PORTA: HCPCS

## 2019-11-04 PROCEDURE — 95806 SLEEP STUDY UNATT&RESP EFFT: CPT | Performed by: PSYCHIATRY & NEUROLOGY

## 2019-11-25 DIAGNOSIS — G47.33 SLEEP APNEA, OBSTRUCTIVE: Primary | ICD-10-CM

## 2019-12-20 RX ORDER — AMLODIPINE BESYLATE AND BENAZEPRIL HYDROCHLORIDE 10; 20 MG/1; MG/1
CAPSULE ORAL
Qty: 90 CAPSULE | Refills: 1 | Status: SHIPPED | OUTPATIENT
Start: 2019-12-20 | End: 2020-07-06

## 2020-04-09 ENCOUNTER — TELEPHONE (OUTPATIENT)
Dept: INTERNAL MEDICINE | Age: 75
End: 2020-04-09

## 2020-04-09 NOTE — TELEPHONE ENCOUNTER
Silvina Maurer called to inform office that they accept option to do a Virtual Visit. Patient has been advised how to create a MyChart. Please call patient with any changes/questions/concerns.

## 2020-04-14 ENCOUNTER — VIRTUAL VISIT (OUTPATIENT)
Dept: INTERNAL MEDICINE | Age: 75
End: 2020-04-14
Payer: MEDICARE

## 2020-04-14 VITALS — DIASTOLIC BLOOD PRESSURE: 78 MMHG | WEIGHT: 225 LBS | BODY MASS INDEX: 31.38 KG/M2 | SYSTOLIC BLOOD PRESSURE: 146 MMHG

## 2020-04-14 PROCEDURE — 4040F PNEUMOC VAC/ADMIN/RCVD: CPT | Performed by: INTERNAL MEDICINE

## 2020-04-14 PROCEDURE — 1123F ACP DISCUSS/DSCN MKR DOCD: CPT | Performed by: INTERNAL MEDICINE

## 2020-04-14 PROCEDURE — G8428 CUR MEDS NOT DOCUMENT: HCPCS | Performed by: INTERNAL MEDICINE

## 2020-04-14 PROCEDURE — 1036F TOBACCO NON-USER: CPT | Performed by: INTERNAL MEDICINE

## 2020-04-14 PROCEDURE — G8417 CALC BMI ABV UP PARAM F/U: HCPCS | Performed by: INTERNAL MEDICINE

## 2020-04-14 PROCEDURE — 99214 OFFICE O/P EST MOD 30 MIN: CPT | Performed by: INTERNAL MEDICINE

## 2020-04-14 ASSESSMENT — PATIENT HEALTH QUESTIONNAIRE - PHQ9
SUM OF ALL RESPONSES TO PHQ QUESTIONS 1-9: 0
2. FEELING DOWN, DEPRESSED OR HOPELESS: 0
SUM OF ALL RESPONSES TO PHQ9 QUESTIONS 1 & 2: 0
1. LITTLE INTEREST OR PLEASURE IN DOING THINGS: 0
SUM OF ALL RESPONSES TO PHQ QUESTIONS 1-9: 0

## 2020-04-14 ASSESSMENT — ENCOUNTER SYMPTOMS
WHEEZING: 0
ABDOMINAL PAIN: 0
CONSTIPATION: 0
SORE THROAT: 0
CHEST TIGHTNESS: 0
COUGH: 0

## 2020-04-16 DIAGNOSIS — D64.89 ANEMIA DUE TO MULTIPLE MECHANISMS: ICD-10-CM

## 2020-04-16 DIAGNOSIS — G81.94 LEFT HEMIPARESIS (HCC): ICD-10-CM

## 2020-04-16 DIAGNOSIS — Z12.5 SCREENING PSA (PROSTATE SPECIFIC ANTIGEN): ICD-10-CM

## 2020-04-16 DIAGNOSIS — R97.20 ELEVATED PSA: ICD-10-CM

## 2020-04-16 DIAGNOSIS — E66.09 EXOGENOUS OBESITY: ICD-10-CM

## 2020-04-16 DIAGNOSIS — E78.00 PURE HYPERCHOLESTEROLEMIA: ICD-10-CM

## 2020-04-16 DIAGNOSIS — Z00.00 MEDICARE ANNUAL WELLNESS VISIT, SUBSEQUENT: ICD-10-CM

## 2020-04-16 DIAGNOSIS — I48.0 PAF (PAROXYSMAL ATRIAL FIBRILLATION) (HCC): ICD-10-CM

## 2020-04-16 DIAGNOSIS — Z78.9 STATIN INTOLERANCE: ICD-10-CM

## 2020-04-16 DIAGNOSIS — I10 ESSENTIAL HYPERTENSION: ICD-10-CM

## 2020-04-16 DIAGNOSIS — R73.01 IFG (IMPAIRED FASTING GLUCOSE): ICD-10-CM

## 2020-04-16 LAB
ALBUMIN SERPL-MCNC: 4.4 G/DL (ref 3.5–5.2)
ALP BLD-CCNC: 75 U/L (ref 40–130)
ALT SERPL-CCNC: 9 U/L (ref 5–41)
ANION GAP SERPL CALCULATED.3IONS-SCNC: 12 MMOL/L (ref 7–19)
AST SERPL-CCNC: 11 U/L (ref 5–40)
BASOPHILS ABSOLUTE: 0.1 K/UL (ref 0–0.2)
BASOPHILS RELATIVE PERCENT: 1.4 % (ref 0–1)
BILIRUB SERPL-MCNC: 0.3 MG/DL (ref 0.2–1.2)
BUN BLDV-MCNC: 27 MG/DL (ref 8–23)
CALCIUM SERPL-MCNC: 9.6 MG/DL (ref 8.8–10.2)
CHLORIDE BLD-SCNC: 106 MMOL/L (ref 98–111)
CHOLESTEROL, TOTAL: 165 MG/DL (ref 160–199)
CO2: 27 MMOL/L (ref 22–29)
CREAT SERPL-MCNC: 0.9 MG/DL (ref 0.5–1.2)
EOSINOPHILS ABSOLUTE: 0.3 K/UL (ref 0–0.6)
EOSINOPHILS RELATIVE PERCENT: 5.2 % (ref 0–5)
GFR NON-AFRICAN AMERICAN: >60
GLUCOSE BLD-MCNC: 115 MG/DL (ref 74–109)
HBA1C MFR BLD: 5.9 % (ref 4–6)
HCT VFR BLD CALC: 44.4 % (ref 42–52)
HDLC SERPL-MCNC: 42 MG/DL (ref 55–121)
HEMOGLOBIN: 14 G/DL (ref 14–18)
IMMATURE GRANULOCYTES #: 0 K/UL
LDL CHOLESTEROL CALCULATED: 102 MG/DL
LYMPHOCYTES ABSOLUTE: 1.2 K/UL (ref 1.1–4.5)
LYMPHOCYTES RELATIVE PERCENT: 18.3 % (ref 20–40)
MCH RBC QN AUTO: 28.5 PG (ref 27–31)
MCHC RBC AUTO-ENTMCNC: 31.5 G/DL (ref 33–37)
MCV RBC AUTO: 90.2 FL (ref 80–94)
MONOCYTES ABSOLUTE: 0.6 K/UL (ref 0–0.9)
MONOCYTES RELATIVE PERCENT: 9.2 % (ref 0–10)
NEUTROPHILS ABSOLUTE: 4.3 K/UL (ref 1.5–7.5)
NEUTROPHILS RELATIVE PERCENT: 65.6 % (ref 50–65)
PDW BLD-RTO: 13.1 % (ref 11.5–14.5)
PLATELET # BLD: 217 K/UL (ref 130–400)
PMV BLD AUTO: 9.8 FL (ref 9.4–12.4)
POTASSIUM SERPL-SCNC: 4.5 MMOL/L (ref 3.5–5)
RBC # BLD: 4.92 M/UL (ref 4.7–6.1)
SODIUM BLD-SCNC: 145 MMOL/L (ref 136–145)
TOTAL PROTEIN: 7.5 G/DL (ref 6.6–8.7)
TRIGL SERPL-MCNC: 103 MG/DL (ref 0–149)
WBC # BLD: 6.6 K/UL (ref 4.8–10.8)

## 2020-06-03 ENCOUNTER — OFFICE VISIT (OUTPATIENT)
Dept: INTERNAL MEDICINE | Age: 75
End: 2020-06-03
Payer: MEDICARE

## 2020-06-03 VITALS
SYSTOLIC BLOOD PRESSURE: 118 MMHG | HEIGHT: 72 IN | DIASTOLIC BLOOD PRESSURE: 60 MMHG | BODY MASS INDEX: 31.29 KG/M2 | OXYGEN SATURATION: 98 % | WEIGHT: 231 LBS | HEART RATE: 82 BPM

## 2020-06-03 PROCEDURE — G8427 DOCREV CUR MEDS BY ELIG CLIN: HCPCS | Performed by: INTERNAL MEDICINE

## 2020-06-03 PROCEDURE — 1036F TOBACCO NON-USER: CPT | Performed by: INTERNAL MEDICINE

## 2020-06-03 PROCEDURE — 99214 OFFICE O/P EST MOD 30 MIN: CPT | Performed by: INTERNAL MEDICINE

## 2020-06-03 PROCEDURE — 1123F ACP DISCUSS/DSCN MKR DOCD: CPT | Performed by: INTERNAL MEDICINE

## 2020-06-03 PROCEDURE — G8417 CALC BMI ABV UP PARAM F/U: HCPCS | Performed by: INTERNAL MEDICINE

## 2020-06-03 PROCEDURE — 4040F PNEUMOC VAC/ADMIN/RCVD: CPT | Performed by: INTERNAL MEDICINE

## 2020-06-03 ASSESSMENT — ENCOUNTER SYMPTOMS
SORE THROAT: 0
CHEST TIGHTNESS: 0
COUGH: 0
WHEEZING: 0
CONSTIPATION: 0
ABDOMINAL PAIN: 0

## 2020-06-03 ASSESSMENT — PATIENT HEALTH QUESTIONNAIRE - PHQ9
1. LITTLE INTEREST OR PLEASURE IN DOING THINGS: 0
SUM OF ALL RESPONSES TO PHQ9 QUESTIONS 1 & 2: 0
SUM OF ALL RESPONSES TO PHQ QUESTIONS 1-9: 0
SUM OF ALL RESPONSES TO PHQ QUESTIONS 1-9: 0
2. FEELING DOWN, DEPRESSED OR HOPELESS: 0

## 2020-06-03 NOTE — PROGRESS NOTES
unable to take any statins    He has history of PAF  Follows with Dr. Maicol Velasco for PAF and history of cryptogenic stroke  Has recently seen Dr. Maicol Velasco  He is wearing loop recorder since about a year ago  His echocardiogram that was done just in March 2020  Result Narrative   · No evidence of pulmonary hypertension is present. · Estimated EF = 65%. · Left ventricular systolic function is normal.  · Left ventricular diastolic dysfunction. · Mild dilation of the aortic root is present. I have advised patient also to call Dr. Maicol Velasco and discuss with him regarding the episode that he had a just few days ago      Essential hypertension  Blood pressure readings reviewed, patient brings them with him to the office  They are running in good range  Blood pressure at office today is 118/60  I am asking him to return to office next week with his blood pressure measuring equipment to compare and make sure it is reading correctly   he will continue amlodipine 10, benazepril 20        CAROLEE on CPAP  Feels more sleepy/ tired  Falls asleep when sittting  Needs to have his pressures checked      Orders Placed This Encounter   Procedures    US CAROTID ARTERY BILATERAL    MRI BRAIN W WO CONTRAST     New Prescriptions    No medications on file         No follow-ups on file. There are no Patient Instructions on file for this visit. EMR Dragon/transcription disclaimer:Significant part of this  encounter note is electronic transcription/translationof spoken language to printed text. The electronic translation of spoken language may be erroneous, or at times, nonsensical words or phrases may be inadvertently transcribed.  Although I have reviewed the note for sucherrors, some may still exist.

## 2020-06-19 ENCOUNTER — HOSPITAL ENCOUNTER (OUTPATIENT)
Dept: VASCULAR LAB | Age: 75
Discharge: HOME | End: 2020-06-19
Payer: MEDICARE

## 2020-06-19 ENCOUNTER — HOSPITAL ENCOUNTER (OUTPATIENT)
Dept: MRI IMAGING | Age: 75
Discharge: HOME | End: 2020-06-19
Payer: MEDICARE

## 2020-06-19 LAB
GFR NON-AFRICAN AMERICAN: 33
PERFORMED ON: ABNORMAL
POC CREATININE: 2 MG/DL (ref 0.3–1.3)
POC SAMPLE TYPE: ABNORMAL

## 2020-06-19 PROCEDURE — 82565 ASSAY OF CREATININE: CPT

## 2020-06-19 PROCEDURE — 6360000004 HC RX CONTRAST MEDICATION: Performed by: INTERNAL MEDICINE

## 2020-06-19 PROCEDURE — A9577 INJ MULTIHANCE: HCPCS | Performed by: INTERNAL MEDICINE

## 2020-06-19 PROCEDURE — 93880 EXTRACRANIAL BILAT STUDY: CPT

## 2020-06-19 PROCEDURE — 70553 MRI BRAIN STEM W/O & W/DYE: CPT

## 2020-06-19 RX ADMIN — GADOBENATE DIMEGLUMINE 20 ML: 529 INJECTION, SOLUTION INTRAVENOUS at 07:50

## 2020-06-29 ENCOUNTER — OFFICE VISIT (OUTPATIENT)
Dept: NEUROSURGERY | Age: 75
End: 2020-06-29
Payer: MEDICARE

## 2020-06-29 VITALS
WEIGHT: 225 LBS | SYSTOLIC BLOOD PRESSURE: 122 MMHG | BODY MASS INDEX: 31.5 KG/M2 | HEIGHT: 71 IN | DIASTOLIC BLOOD PRESSURE: 74 MMHG | HEART RATE: 86 BPM

## 2020-06-29 PROCEDURE — G8417 CALC BMI ABV UP PARAM F/U: HCPCS | Performed by: NURSE PRACTITIONER

## 2020-06-29 PROCEDURE — 1123F ACP DISCUSS/DSCN MKR DOCD: CPT | Performed by: NURSE PRACTITIONER

## 2020-06-29 PROCEDURE — 4040F PNEUMOC VAC/ADMIN/RCVD: CPT | Performed by: NURSE PRACTITIONER

## 2020-06-29 PROCEDURE — 99214 OFFICE O/P EST MOD 30 MIN: CPT | Performed by: NURSE PRACTITIONER

## 2020-06-29 PROCEDURE — 1036F TOBACCO NON-USER: CPT | Performed by: NURSE PRACTITIONER

## 2020-06-29 PROCEDURE — G8427 DOCREV CUR MEDS BY ELIG CLIN: HCPCS | Performed by: NURSE PRACTITIONER

## 2020-06-29 NOTE — PROGRESS NOTES
[facility] Office Note      Patient:   Андрей Priest  MR#:    [id number]  Account Number:                         YOB: 1945  Date of Evaluation:  6/29/2020  Time of Note:                          2:49 PM  Primary/Referring Physician:  Myesha Magaña MD   Consulting Physician:  SMILEY Bee    NEW PATIENT CONSULTATION    Chief Complaint   Patient presents with    New Patient     HISTORY OF PRESENT ILLNESS    Андрей Priest is a 66y.o. year old male here for evaluation of abnormal MRI brain. He reported having an episode of ataxia, confusion. Symptoms were a sudden onset and lasted for about an hour and then returned to baseline. He reported that he had been working outside the day before. He denies further stroke like symptoms. He has a history of TIA and CVA. Prior CVA affected the left side. He does have a history of atrial fibrillation, has loop recorder in place, he is on Xarelto as well. Followed by Dr. Elvin Murry at [facility]. He is on an anti hypertensive. He has been on a statin previously but states that he lost muscle tone while on this. He is not a diabetic, not a smoker. No other complaints.      Past Medical History:   Diagnosis Date    Arthritis     GERD (gastroesophageal reflux disease)     Hypertension     Lumbar compression fracture, sequela     1980    CAROLEE on CPAP     PAF (paroxysmal atrial fibrillation) ([address]     Stroke ([facility] [address] [id number]    minis stroke about 5 years ago as well, both on the left side    Stroke ([address]     10 years ago, weakness on left side       Past Surgical History:   Procedure Laterality Date    CHOLECYSTECTOMY      [facility]  2008    [facility]    COLONOSCOPY N/A 6/25/2018    Dr Andrey Villa AP (-) dysplasia x 4--6 month recall    COLONOSCOPY N/A 12/12/2018    Dr Andrey Villa AP (-) dysplasia x 1, HP x 2--3 yr recall    HERNIA REPAIR      TOTAL KNEE ARTHROPLASTY Right     TOTAL KNEE ARTHROPLASTY Left     UPPER GASTROINTESTINAL

## 2020-07-06 RX ORDER — AMLODIPINE BESYLATE AND BENAZEPRIL HYDROCHLORIDE 10; 20 MG/1; MG/1
CAPSULE ORAL
Qty: 90 CAPSULE | Refills: 1 | Status: SHIPPED | OUTPATIENT
Start: 2020-07-06 | End: 2021-01-04

## 2020-07-06 NOTE — TELEPHONE ENCOUNTER
Veronica Suarez called requesting a refill of the below medication which has been pended for you:     Requested Prescriptions     Pending Prescriptions Disp Refills    amLODIPine-benazepril (LOTREL) 10-20 MG per capsule [Pharmacy Med Name: AMLODIPINE-BENAZEPRIL 10-20 10-20 CAP] 90 capsule 1     Sig: TAKE ONE CAPSULE BY MOUTH DAILY       Last Appointment Date: 6/3/2020  Next Appointment Date: 10/14/2020    Allergies   Allergen Reactions    Statins Other (See Comments)     weak

## 2020-10-09 DIAGNOSIS — E78.00 PURE HYPERCHOLESTEROLEMIA: ICD-10-CM

## 2020-10-09 DIAGNOSIS — G47.33 OSA ON CPAP: ICD-10-CM

## 2020-10-09 DIAGNOSIS — I48.0 PAF (PAROXYSMAL ATRIAL FIBRILLATION) (HCC): ICD-10-CM

## 2020-10-09 DIAGNOSIS — R73.01 IFG (IMPAIRED FASTING GLUCOSE): ICD-10-CM

## 2020-10-09 DIAGNOSIS — Z12.5 SCREENING PSA (PROSTATE SPECIFIC ANTIGEN): ICD-10-CM

## 2020-10-09 DIAGNOSIS — I10 ESSENTIAL HYPERTENSION: ICD-10-CM

## 2020-10-09 LAB
ALBUMIN SERPL-MCNC: 4.4 G/DL (ref 3.5–5.2)
ALP BLD-CCNC: 79 U/L (ref 40–130)
ALT SERPL-CCNC: 9 U/L (ref 5–41)
ANION GAP SERPL CALCULATED.3IONS-SCNC: 14 MMOL/L (ref 7–19)
AST SERPL-CCNC: 9 U/L (ref 5–40)
BACTERIA: NEGATIVE /HPF
BASOPHILS ABSOLUTE: 0.1 K/UL (ref 0–0.2)
BASOPHILS RELATIVE PERCENT: 0.9 % (ref 0–1)
BILIRUB SERPL-MCNC: 0.3 MG/DL (ref 0.2–1.2)
BILIRUBIN URINE: NEGATIVE
BLOOD, URINE: NEGATIVE
BUN BLDV-MCNC: 22 MG/DL (ref 8–23)
CALCIUM SERPL-MCNC: 9.6 MG/DL (ref 8.8–10.2)
CHLORIDE BLD-SCNC: 104 MMOL/L (ref 98–111)
CHOLESTEROL, TOTAL: 162 MG/DL (ref 160–199)
CLARITY: CLEAR
CO2: 25 MMOL/L (ref 22–29)
COLOR: YELLOW
CREAT SERPL-MCNC: 1.1 MG/DL (ref 0.5–1.2)
CRYSTALS, UA: ABNORMAL /HPF
EOSINOPHILS ABSOLUTE: 0.3 K/UL (ref 0–0.6)
EOSINOPHILS RELATIVE PERCENT: 4.6 % (ref 0–5)
EPITHELIAL CELLS, UA: 0 /HPF (ref 0–5)
GFR AFRICAN AMERICAN: >59
GFR NON-AFRICAN AMERICAN: >60
GLUCOSE BLD-MCNC: 109 MG/DL (ref 74–109)
GLUCOSE URINE: NEGATIVE MG/DL
HBA1C MFR BLD: 5.9 % (ref 4–6)
HCT VFR BLD CALC: 45.1 % (ref 42–52)
HDLC SERPL-MCNC: 40 MG/DL (ref 55–121)
HEMOGLOBIN: 14.3 G/DL (ref 14–18)
HYALINE CASTS: 0 /HPF (ref 0–8)
IMMATURE GRANULOCYTES #: 0 K/UL
KETONES, URINE: NEGATIVE MG/DL
LDL CHOLESTEROL CALCULATED: 98 MG/DL
LEUKOCYTE ESTERASE, URINE: ABNORMAL
LYMPHOCYTES ABSOLUTE: 1.1 K/UL (ref 1.1–4.5)
LYMPHOCYTES RELATIVE PERCENT: 17.7 % (ref 20–40)
MCH RBC QN AUTO: 28.1 PG (ref 27–31)
MCHC RBC AUTO-ENTMCNC: 31.7 G/DL (ref 33–37)
MCV RBC AUTO: 88.6 FL (ref 80–94)
MONOCYTES ABSOLUTE: 0.7 K/UL (ref 0–0.9)
MONOCYTES RELATIVE PERCENT: 10.2 % (ref 0–10)
NEUTROPHILS ABSOLUTE: 4.2 K/UL (ref 1.5–7.5)
NEUTROPHILS RELATIVE PERCENT: 66 % (ref 50–65)
NITRITE, URINE: NEGATIVE
PDW BLD-RTO: 13.2 % (ref 11.5–14.5)
PH UA: 5 (ref 5–8)
PLATELET # BLD: 214 K/UL (ref 130–400)
PMV BLD AUTO: 10.5 FL (ref 9.4–12.4)
POTASSIUM SERPL-SCNC: 4.1 MMOL/L (ref 3.5–5)
PROTEIN UA: ABNORMAL MG/DL
RBC # BLD: 5.09 M/UL (ref 4.7–6.1)
RBC UA: 1 /HPF (ref 0–4)
SODIUM BLD-SCNC: 143 MMOL/L (ref 136–145)
SPECIFIC GRAVITY UA: 1.02 (ref 1–1.03)
TOTAL PROTEIN: 7.1 G/DL (ref 6.6–8.7)
TRIGL SERPL-MCNC: 119 MG/DL (ref 0–149)
TSH SERPL DL<=0.05 MIU/L-ACNC: 0.73 UIU/ML (ref 0.27–4.2)
UROBILINOGEN, URINE: 1 E.U./DL
WBC # BLD: 6.4 K/UL (ref 4.8–10.8)
WBC UA: 3 /HPF (ref 0–5)

## 2020-10-14 ENCOUNTER — OFFICE VISIT (OUTPATIENT)
Dept: INTERNAL MEDICINE | Age: 75
End: 2020-10-14
Payer: MEDICARE

## 2020-10-14 VITALS
BODY MASS INDEX: 32.48 KG/M2 | OXYGEN SATURATION: 98 % | SYSTOLIC BLOOD PRESSURE: 128 MMHG | DIASTOLIC BLOOD PRESSURE: 78 MMHG | HEIGHT: 71 IN | HEART RATE: 77 BPM | WEIGHT: 232 LBS | RESPIRATION RATE: 18 BRPM

## 2020-10-14 PROBLEM — I77.89 AORTIC ROOT ENLARGEMENT (HCC): Status: ACTIVE | Noted: 2020-10-14

## 2020-10-14 LAB — PROSTATE SPECIFIC ANTIGEN: 5.93 NG/ML (ref 0–4)

## 2020-10-14 PROCEDURE — G8427 DOCREV CUR MEDS BY ELIG CLIN: HCPCS | Performed by: INTERNAL MEDICINE

## 2020-10-14 PROCEDURE — G8417 CALC BMI ABV UP PARAM F/U: HCPCS | Performed by: INTERNAL MEDICINE

## 2020-10-14 PROCEDURE — G8484 FLU IMMUNIZE NO ADMIN: HCPCS | Performed by: INTERNAL MEDICINE

## 2020-10-14 PROCEDURE — 4040F PNEUMOC VAC/ADMIN/RCVD: CPT | Performed by: INTERNAL MEDICINE

## 2020-10-14 PROCEDURE — 99214 OFFICE O/P EST MOD 30 MIN: CPT | Performed by: INTERNAL MEDICINE

## 2020-10-14 PROCEDURE — 1123F ACP DISCUSS/DSCN MKR DOCD: CPT | Performed by: INTERNAL MEDICINE

## 2020-10-14 PROCEDURE — G0439 PPPS, SUBSEQ VISIT: HCPCS | Performed by: INTERNAL MEDICINE

## 2020-10-14 PROCEDURE — 1036F TOBACCO NON-USER: CPT | Performed by: INTERNAL MEDICINE

## 2020-10-14 ASSESSMENT — ENCOUNTER SYMPTOMS
ABDOMINAL PAIN: 0
CHEST TIGHTNESS: 0
SORE THROAT: 0
COUGH: 0
WHEEZING: 0
CONSTIPATION: 0

## 2020-10-14 ASSESSMENT — PATIENT HEALTH QUESTIONNAIRE - PHQ9
1. LITTLE INTEREST OR PLEASURE IN DOING THINGS: 0
2. FEELING DOWN, DEPRESSED OR HOPELESS: 0
SUM OF ALL RESPONSES TO PHQ QUESTIONS 1-9: 0
SUM OF ALL RESPONSES TO PHQ QUESTIONS 1-9: 0
SUM OF ALL RESPONSES TO PHQ9 QUESTIONS 1 & 2: 0

## 2020-10-14 ASSESSMENT — LIFESTYLE VARIABLES: HOW OFTEN DO YOU HAVE A DRINK CONTAINING ALCOHOL: 0

## 2020-10-14 NOTE — PROGRESS NOTES
Chief Complaint:   Noah Longoria is a 66 y.o. male who presents forcomplete physical exam.    History of Present Illness:      Patient is here for  [address]:    Dr Dayanara Watson Dr [facility] cardiology  Dr Heide Mansfield urology  Dr Kathy Alcantar- post prior knee replacements    PT ABLE TO PERFORM DAILY ADL'S WITHOUT ASSISTANCE  HOME SAFETYREVIEWED WITH PATIENT- NO ISSUES   NO COMPLAINTS ABOUT HEARING DEFICIT  NO BEHAVIORAL OR PSYCHOSOCIAL RISKS DETECTED  DEPRESSION SCREEN REVIEWED    NO COGNITIVE DEFICIT DETECTED    _____________________________________________________________________    Pt presents today for follow-up and management of following chronic medical conditions:    Essential hypertension- Patient reports her Bp has been well controlled ( systolic below 244; diastolic below 90) at home when checked with home/ store equipment. No side effects related to blood pressure medications were reported by patient     Cerebrovascular disease- Cerebral arteriosclerosis with history of previous stroke  Post previous stroke  2007  ( mild residual left sided weakness); ministroke 2010 same location  Unable to tolerate any statin drugs     IFG (impaired fasting glucose) - has tried to stay on  diet     PAF, on  Xarelto + flecanide  Has recently seen  [facility]  He is wearing loop recorder since about a year ago  His echocardiogram that was done just in March 2020  Result Narrative   · No evidence of pulmonary hypertension is present. · Estimated EF = 65%. · Left ventricular systolic function is normal.  · Left ventricular diastolic dysfunction.   · Mild dilation of the aortic root is present.       Pure hypercholesterolemia - unable to take statis     CAROLEE on CPAP x 10 yrs  Feels more sleepy/ tired  Falls asleep when sittting    Patient Active Problem List    Diagnosis Date Noted    Aortic root enlargement ([address] 10/14/2020    Statin intolerance 04/09/2019    Weakness 10/19/2018    Gait abnormality 10/19/2018    History of stroke 10/19/2018    Low back pain 10/19/2018    Hemorrhoids 09/24/2018    Elevated PSA 09/18/2018    PAF (paroxysmal atrial fibrillation) (HCC) 09/18/2018    Lumbar compression fracture, sequela 09/18/2018    Left hemiparesis ([address] 04/11/2018    Essential hypertension 03/13/2018    Pure hypercholesterolemia 03/13/2018    CAROLEE on CPAP 03/13/2018    Cerebrovascular disease 03/13/2018    Cerebral arteriosclerosis with history of previous stroke 03/13/2018    IFG (impaired fasting glucose) 03/13/2018    Exogenous obesity 03/13/2018    Atypical chest pain 12/28/2016       Past Medical History:   Diagnosis Date    Arthritis     GERD (gastroesophageal reflux disease)     Hypertension     Lumbar compression fracture, sequela     1980    CAROLEE on CPAP     PAF (paroxysmal atrial fibrillation) ([address]     Stroke ([address] [id number]    minis stroke about 5 years ago as well, both on the left side    Stroke ([address]     10 years ago, weakness on left side         Current Outpatient Medications   Medication Sig Dispense Refill    amLODIPine-benazepril (LOTREL) 10-20 MG per capsule TAKE ONE CAPSULE BY MOUTH DAILY 90 capsule 1    flecainide (TAMBOCOR) 50 MG tablet Take 50 mg by mouth 2 times daily      rivaroxaban (XARELTO) 15 MG TABS tablet Take 15 mg by mouth daily        No current facility-administered medications for this visit.       Allergies   Allergen Reactions    Statins Other (See Comments)     weak       Social History     Socioeconomic History    Marital status:      Spouse name: None    Number of children: None    Years of education: None    Highest education level: None   Occupational History    None   Social Needs    Financial resource strain: None    Food insecurity     Worry: None     Inability: None    Transportation needs     Medical: None     Non-medical: None   Tobacco Use    Smoking status: Former Smoker     Packs/day: 0.50     Years: 20.00 Pack years: 10.00     Types: Cigars     Last attempt to quit: 2019     Years since quittin.1    Smokeless tobacco: Never Used    Tobacco comment: Pt only smoked Cigars   Substance and Sexual Activity    Alcohol use: No    Drug use: No    Sexual activity: None   Lifestyle    Physical activity     Days per week: None     Minutes per session: None    Stress: None   Relationships    Social connections     Talks on phone: None     Gets together: None     Attends Mandaen service: None     Active member of club or organization: None     Attends meetings of clubs or organizations: None     Relationship status: None    Intimate partner violence     Fear of current or ex partner: None     Emotionally abused: None     Physically abused: None     Forced sexual activity: None   Other Topics Concern    None   Social History Narrative    None     Family History   Problem Relation Age of Onset    Colon Cancer Neg Hx     Colon Polyps Neg Hx     Liver Cancer Neg Hx     Liver Disease Neg Hx     Esophageal Cancer Neg Hx     Stomach Cancer Neg Hx     Rectal Cancer Neg Hx        Past Surgical History:   Procedure Laterality Date    CHOLECYSTECTOMY      COLONOSCOPY      [facility]    COLONOSCOPY N/A 2018    Dr Baby Bamberger AP (-) dysplasia x 4--6 month recall    COLONOSCOPY N/A 2018    Dr Baby Bamberger AP (-) dysplasia x 1, HP x 2--3 yr recall    HERNIA REPAIR      TOTAL KNEE ARTHROPLASTY Right     TOTAL KNEE ARTHROPLASTY Left     UPPER GASTROINTESTINAL ENDOSCOPY      [facility]         Lab Review   Orders Only on 10/09/2020   Component Date Value    TSH 10/09/2020 0.727     Color, UA 10/09/2020 YELLOW     Clarity, UA 10/09/2020 Clear     Glucose, Ur 10/09/2020 Negative     Bilirubin Urine 10/09/2020 Negative     Ketones, Urine 10/09/2020 Negative     Specific Gravity, UA 10/09/2020 1.018     Blood, Urine 10/09/2020 Negative     pH, UA 10/09/2020 5.0     Protein, UA 10/09/2020 TRACE*    Urobilinogen, Urine 10/09/2020 1.0     Nitrite, Urine 10/09/2020 Negative     Leukocyte Esterase, Urine 10/09/2020 TRACE*    Cholesterol, Total 10/09/2020 162     Triglycerides 10/09/2020 119     HDL 10/09/2020 40*    LDL Calculated 10/09/2020 98     Hemoglobin A1C 10/09/2020 5.9     Sodium 10/09/2020 143     Potassium 10/09/2020 4.1     Chloride 10/09/2020 104     CO2 10/09/2020 25     Anion Gap 10/09/2020 14     Glucose 10/09/2020 109     BUN 10/09/2020 22     CREATININE 10/09/2020 1.1     GFR Non- 10/09/2020 >60     GFR  10/09/2020 >59     Calcium 10/09/2020 9.6     Total Protein 10/09/2020 7.1     Alb 10/09/2020 4.4     Total Bilirubin 10/09/2020 0.3     Alkaline Phosphatase 10/09/2020 79     ALT 10/09/2020 9     AST 10/09/2020 9     WBC 10/09/2020 6.4     RBC 10/09/2020 5.09     Hemoglobin 10/09/2020 14.3     Hematocrit 10/09/2020 45.1     MCV 10/09/2020 88.6     MCH 10/09/2020 28.1     MCHC 10/09/2020 31.7*    RDW 10/09/2020 13.2     Platelets 95/27/7849 214     MPV 10/09/2020 10.5     Neutrophils % 10/09/2020 66.0*    Lymphocytes % 10/09/2020 17.7*    Monocytes % 10/09/2020 10.2*    Eosinophils % 10/09/2020 4.6     Basophils % 10/09/2020 0.9     Neutrophils Absolute 10/09/2020 4.2     Immature Granulocytes # 10/09/2020 0.0     Lymphocytes Absolute 10/09/2020 1.1     Monocytes Absolute 10/09/2020 0.70     Eosinophils Absolute 10/09/2020 0.30     Basophils Absolute 10/09/2020 0.10     Bacteria, UA 10/09/2020 NEGATIVE*    Crystals, UA 10/09/2020 NEG*    Hyaline Casts, UA 10/09/2020 0     WBC, UA 10/09/2020 3     RBC, UA 10/09/2020 1     Epithelial Cells, UA 10/09/2020 0          Review of Systems   Constitutional: Positive for fatigue. Negative for chills and fever. HENT: Negative for congestion, ear pain, nosebleeds, postnasal drip and sore throat.     Respiratory: Negative for cough, chest tightness and wheezing. Cardiovascular: Negative for chest pain, palpitations and leg swelling. Gastrointestinal: Negative for abdominal pain and constipation. Genitourinary: Negative for dysuria and urgency. Musculoskeletal: Positive for arthralgias. Bilateral knee pains   Skin: Negative for rash. Neurological: Positive for weakness. Negative for dizziness and headaches. Psychiatric/Behavioral: Negative. Vitals:    10/14/20 1050   BP: 128/78   Site: Left Upper Arm   Position: Sitting   Cuff Size: Large Adult   Pulse: 77   Resp: 18   SpO2: 98%   Weight: 232 lb (105.2 kg)   Height: 5' 11\" (1.803 m)      Wt Readings from Last 3 Encounters:   10/14/20 232 lb (105.2 kg)   06/29/20 225 lb (102.1 kg)   06/03/20 231 lb (104.8 kg)   Body mass index is 32.36 kg/m². BP Readings from Last 3 Encounters:   10/14/20 128/78   06/29/20 122/74   06/03/20 118/60       Physical Exam  Constitutional:       Appearance: He is well-developed. HENT:      Right Ear: External ear normal.      Left Ear: External ear normal.      Mouth/Throat:      Pharynx: No oropharyngeal exudate. Eyes:      Conjunctiva/sclera: Conjunctivae normal.      Pupils: Pupils are equal, round, and reactive to light. Neck:      Musculoskeletal: Neck supple. Thyroid: No thyromegaly. Vascular: No JVD. Cardiovascular:      Rate and Rhythm: Normal rate. Heart sounds: Normal heart sounds. No murmur. Pulmonary:      Effort: No respiratory distress. Breath sounds: Normal breath sounds. No wheezing or rales. Chest:      Chest wall: No tenderness. Abdominal:      General: Bowel sounds are normal.      Palpations: Abdomen is soft. Musculoskeletal:      Comments: Arthritic changes bilateral knees   Lymphadenopathy:      Cervical: No cervical adenopathy. Skin:     General: Skin is warm. Findings: No rash. Neurological:      Mental Status: He is oriented to person, place, and time. ASSESSMENT/PLAN:    MEDICARE WELLNESS SCREENING/ MAINTENANCE:  1:PSA/ PROSTATE CANCER SCREEN- PSa elevated 10/2019- 4.86 (5.79)  No longer Follows urology  Wants to have repeat PSA done with us today  2. SCREENING CSCOPE 6/18  3. CARDIOVASCULAR SCREENING- LIPID PANEL DONE  4. DIABETES SCREEN DONE - FBS =ABOVE LABS  5. PNEUMONIA VACCINATION- as completed the series  6. INFLUENZA VACCINATION: TO BE DONE IN FALL- refuses  7. HEP B VACCINATION- PT DECLINES  8. HIV/ HEP SCREENING na  9. AAA SCREEN- Done     HEALTH PLAN:  1. HEALTHY DIET: Healthy heart diet, no added sugar since patient is borderline diabetic  2. EXERCISE- slow walks 40 minutes ×5 days per week as minimal  3. FALL RISK SCREEN REVIEWED- NO INCREASED FALL RISK ON EXAM    Vision Screening:  No exam data present    Fall Risk:  Timed Up and Go Test > 12 seconds? (Complete if either Fall Risk answers are Yes): no  2 or more falls in past year?: no  Fall with injury in past year?: no    Depression:  PHQ-2 Score: 0    Cognitive:  Clock Drawing Test (CDT) Score: Normal    ______________________________________________________________________    Management plan for chronic medical conditions:    Pure hypercholesterolemia  Statin intolerance  LDL  98 (117 ( 134)(80)  Had previous issues with statin related myopathy ( tried crestor and liptor)  Morales Sheriff his prior history of stroke.  This time patient wishes not to try any other lipid-lowering medications    IFG (impaired fasting glucose) a1c stable 5.9 ( 6.0 (6.1)(6.1) (6.1), diet and weight loss recommended    Exogenous obesity  Pt was given approximately 5 minutes of counseling about diet and exercise including education on what calories are, where calories come from, the need for portion control,following lower carbohydrate dietary regimen and healthy snacks along side an active lifestyle with supplementary exercise of approx 30 minutes a week, 5 days a week of exercise for weight loss. Zeus Coleman patient voiced increased understanding of the topics discussed.       He has history of PAF  Follows with Dr. Tj Shi for PAF and history of cryptogenic stroke  Has recently seen Dr. Tj Shi  He is wearing loop recorder since about a year ago  His echocardiogram that was done just in March 2020  Result Narrative   · No evidence of pulmonary hypertension is present. · Estimated EF = 65%. · Left ventricular systolic function is normal.  · Left ventricular diastolic dysfunction. · Mild dilation of the aortic root is present. Aortic root dilatation  CT 10/2020  View ordered is unremarkable at approximately 42 mm in maximal  dimension in the ascending aorta  As per dr Akira Emery hypertension  Blood pressure readings reviewed  he will continue amlodipine 10, benazepril 20         CAROLEE on CPAP  Doing well  Cont current settings    Post CVA  Residual mild LEFT LE weakness        Lung nodules:  CT 10/2020  Lungs: Centrilobular emphysematous changes present. Small pulmonary  nodules are present. 4 mm nodules present on image 81 in the periphery  of the right lung. 6 mm nodule is present on image 89 in the right lower  lobe  7 mm nodule is present in the right lung on image 95  Calcified granulomas are present in the right and left lung. 5 mm  nodules present left upper lobe on image 57 mm nodule is present on  image 86  5 mm nodule noted in the left lower lobe on image 110. Mild pleural  thickening is noted in the left chest posteriorly. . The trachea and  bronchial tree are patent. Plan repeat CT chest 6 months    Orders Placed This Encounter   Procedures    CT CHEST W CONTRAST    Comprehensive Metabolic Panel    Hemoglobin A1C    Lipid Panel     New Prescriptions    No medications on file      There are no Patient Instructions on file for this visit. Return in about 6 months (around 4/14/2021) for Medication check.          EMR Dragon/transcription disclaimer:Significant part of this  encounter note is

## 2021-01-04 RX ORDER — AMLODIPINE BESYLATE AND BENAZEPRIL HYDROCHLORIDE 10; 20 MG/1; MG/1
CAPSULE ORAL
Qty: 90 CAPSULE | Refills: 1 | Status: SHIPPED | OUTPATIENT
Start: 2021-01-04 | End: 2021-04-14 | Stop reason: SDUPTHER

## 2021-04-12 DIAGNOSIS — Z78.9 STATIN INTOLERANCE: ICD-10-CM

## 2021-04-12 DIAGNOSIS — I48.0 PAF (PAROXYSMAL ATRIAL FIBRILLATION) (HCC): ICD-10-CM

## 2021-04-12 DIAGNOSIS — G81.94 LEFT HEMIPARESIS (HCC): ICD-10-CM

## 2021-04-12 DIAGNOSIS — G47.33 OSA ON CPAP: ICD-10-CM

## 2021-04-12 DIAGNOSIS — I77.89 AORTIC ROOT ENLARGEMENT (HCC): ICD-10-CM

## 2021-04-12 DIAGNOSIS — R73.01 IFG (IMPAIRED FASTING GLUCOSE): ICD-10-CM

## 2021-04-12 DIAGNOSIS — R91.8 LUNG NODULES: ICD-10-CM

## 2021-04-12 DIAGNOSIS — E78.00 PURE HYPERCHOLESTEROLEMIA: ICD-10-CM

## 2021-04-12 DIAGNOSIS — I10 ESSENTIAL HYPERTENSION: ICD-10-CM

## 2021-04-12 DIAGNOSIS — Z00.00 MEDICARE ANNUAL WELLNESS VISIT, SUBSEQUENT: ICD-10-CM

## 2021-04-12 LAB
ALBUMIN SERPL-MCNC: 4.2 G/DL (ref 3.5–5.2)
ALP BLD-CCNC: 83 U/L (ref 40–130)
ALT SERPL-CCNC: 12 U/L (ref 5–41)
ANION GAP SERPL CALCULATED.3IONS-SCNC: 9 MMOL/L (ref 7–19)
AST SERPL-CCNC: 13 U/L (ref 5–40)
BILIRUB SERPL-MCNC: 0.3 MG/DL (ref 0.2–1.2)
BUN BLDV-MCNC: 19 MG/DL (ref 8–23)
CALCIUM SERPL-MCNC: 9.5 MG/DL (ref 8.8–10.2)
CHLORIDE BLD-SCNC: 102 MMOL/L (ref 98–111)
CHOLESTEROL, TOTAL: 163 MG/DL (ref 160–199)
CO2: 27 MMOL/L (ref 22–29)
CREAT SERPL-MCNC: 1.1 MG/DL (ref 0.5–1.2)
GFR AFRICAN AMERICAN: >59
GFR NON-AFRICAN AMERICAN: >60
GLUCOSE BLD-MCNC: 116 MG/DL (ref 74–109)
HBA1C MFR BLD: 5.8 % (ref 4–6)
HDLC SERPL-MCNC: 37 MG/DL (ref 55–121)
LDL CHOLESTEROL CALCULATED: 102 MG/DL
POTASSIUM SERPL-SCNC: 4.5 MMOL/L (ref 3.5–5)
SODIUM BLD-SCNC: 138 MMOL/L (ref 136–145)
TOTAL PROTEIN: 7 G/DL (ref 6.6–8.7)
TRIGL SERPL-MCNC: 120 MG/DL (ref 0–149)

## 2021-04-14 ENCOUNTER — OFFICE VISIT (OUTPATIENT)
Dept: INTERNAL MEDICINE | Age: 76
End: 2021-04-14
Payer: MEDICARE

## 2021-04-14 VITALS
DIASTOLIC BLOOD PRESSURE: 80 MMHG | OXYGEN SATURATION: 96 % | HEART RATE: 82 BPM | WEIGHT: 233 LBS | RESPIRATION RATE: 18 BRPM | BODY MASS INDEX: 32.62 KG/M2 | HEIGHT: 71 IN | SYSTOLIC BLOOD PRESSURE: 128 MMHG

## 2021-04-14 DIAGNOSIS — E66.09 EXOGENOUS OBESITY: ICD-10-CM

## 2021-04-14 DIAGNOSIS — G81.94 LEFT HEMIPARESIS (HCC): ICD-10-CM

## 2021-04-14 DIAGNOSIS — I77.89 AORTIC ROOT ENLARGEMENT (HCC): ICD-10-CM

## 2021-04-14 DIAGNOSIS — Z11.59 NEED FOR HEPATITIS C SCREENING TEST: ICD-10-CM

## 2021-04-14 DIAGNOSIS — I67.2 CEREBRAL ARTERIOSCLEROSIS WITH HISTORY OF PREVIOUS STROKE: ICD-10-CM

## 2021-04-14 DIAGNOSIS — Z78.9 STATIN INTOLERANCE: ICD-10-CM

## 2021-04-14 DIAGNOSIS — I10 ESSENTIAL HYPERTENSION: Primary | ICD-10-CM

## 2021-04-14 DIAGNOSIS — I48.0 PAF (PAROXYSMAL ATRIAL FIBRILLATION) (HCC): ICD-10-CM

## 2021-04-14 DIAGNOSIS — E78.00 PURE HYPERCHOLESTEROLEMIA: ICD-10-CM

## 2021-04-14 DIAGNOSIS — G47.33 OSA ON CPAP: ICD-10-CM

## 2021-04-14 DIAGNOSIS — Z99.89 OSA ON CPAP: ICD-10-CM

## 2021-04-14 DIAGNOSIS — R73.01 IFG (IMPAIRED FASTING GLUCOSE): ICD-10-CM

## 2021-04-14 DIAGNOSIS — Z86.73 CEREBRAL ARTERIOSCLEROSIS WITH HISTORY OF PREVIOUS STROKE: ICD-10-CM

## 2021-04-14 DIAGNOSIS — R97.20 ELEVATED PSA: ICD-10-CM

## 2021-04-14 LAB — PROSTATE SPECIFIC ANTIGEN: 5.46 NG/ML (ref 0–4)

## 2021-04-14 PROCEDURE — 1036F TOBACCO NON-USER: CPT | Performed by: INTERNAL MEDICINE

## 2021-04-14 PROCEDURE — 4040F PNEUMOC VAC/ADMIN/RCVD: CPT | Performed by: INTERNAL MEDICINE

## 2021-04-14 PROCEDURE — 1123F ACP DISCUSS/DSCN MKR DOCD: CPT | Performed by: INTERNAL MEDICINE

## 2021-04-14 PROCEDURE — 99214 OFFICE O/P EST MOD 30 MIN: CPT | Performed by: INTERNAL MEDICINE

## 2021-04-14 PROCEDURE — G8427 DOCREV CUR MEDS BY ELIG CLIN: HCPCS | Performed by: INTERNAL MEDICINE

## 2021-04-14 PROCEDURE — G8417 CALC BMI ABV UP PARAM F/U: HCPCS | Performed by: INTERNAL MEDICINE

## 2021-04-14 RX ORDER — AMLODIPINE BESYLATE AND BENAZEPRIL HYDROCHLORIDE 10; 20 MG/1; MG/1
CAPSULE ORAL
Qty: 90 CAPSULE | Refills: 1 | Status: SHIPPED | OUTPATIENT
Start: 2021-04-14 | End: 2022-01-11

## 2021-04-14 ASSESSMENT — ENCOUNTER SYMPTOMS
WHEEZING: 0
SORE THROAT: 0
CONSTIPATION: 0
ABDOMINAL PAIN: 0
CHEST TIGHTNESS: 0
COUGH: 0

## 2021-04-14 ASSESSMENT — PATIENT HEALTH QUESTIONNAIRE - PHQ9
SUM OF ALL RESPONSES TO PHQ QUESTIONS 1-9: 0
SUM OF ALL RESPONSES TO PHQ9 QUESTIONS 1 & 2: 0
1. LITTLE INTEREST OR PLEASURE IN DOING THINGS: 0
SUM OF ALL RESPONSES TO PHQ QUESTIONS 1-9: 0
SUM OF ALL RESPONSES TO PHQ QUESTIONS 1-9: 0

## 2021-04-14 NOTE — PROGRESS NOTES
Chief Complaint   Patient presents with    6 Month Follow-Up     History of presenting illness:  Ernie Turner is a63 y.o. male who presents today for follow up on his chronic medical conditions as noted below.     Essential hypertension- Patient reports her Bp has been well controlled ( systolic below 755; diastolic below 90) at home when checked with home/ store equipment. No side effects related to blood pressure medications were reported by patient     Cerebrovascular disease- Cerebral arteriosclerosis with history of previous stroke  Post previous stroke  2007  ( mild residual left sided weakness); ministroke 2010 same location  Unable to tolerate any statin drugs     IFG (impaired fasting glucose) - has tried to stay on  diet     PAF, on  Xarelto + flecanide  Has recently seen Dr. Cathryn Meyer  He is wearing loop recorder since about a year ago  His echocardiogram that was done just in March 2020  Result Narrative   · No evidence of pulmonary hypertension is present. · Estimated EF = 65%. · Left ventricular systolic function is normal.  · Left ventricular diastolic dysfunction.   · Mild dilation of the aortic root is present.       Pure hypercholesterolemia - unable to take statis     CAROLEE on CPAP x 10 yrs  Feels more sleepy/ tired  Falls asleep when sittting      Patient Active Problem List    Diagnosis Date Noted    Aortic root enlargement ([facility] [address] [facility] 10/14/2020    Statin intolerance 04/09/2019    Weakness 10/19/2018    Gait abnormality 10/19/2018    History of stroke 10/19/2018    Low back pain 10/19/2018    Hemorrhoids 09/24/2018    Elevated PSA 09/18/2018    PAF (paroxysmal atrial fibrillation) ([address] 09/18/2018    Lumbar compression fracture, sequela 09/18/2018    Left hemiparesis ([address] 04/11/2018    Essential hypertension 03/13/2018    Pure hypercholesterolemia 03/13/2018    CAROLEE on CPAP 03/13/2018    Cerebrovascular disease 03/13/2018    Cerebral arteriosclerosis with history of previous stroke 2018    IFG (impaired fasting glucose) 2018    Exogenous obesity 2018    Atypical chest pain 2016     Past Medical History:   Diagnosis Date    Arthritis     GERD (gastroesophageal reflux disease)     Hypertension     Lumbar compression fracture, sequela         CAROLEE on CPAP     PA[facility] ([address]oxysmal [facility] fibrillation) [address]     Stroke ([facility] [id number]    minis stroke about 5 years ago as well, both on the left side    Stroke ([facility]     10 years ago, weakness on left side      Past Surgical History:   Procedure Laterality Date    CHOLECYSTECTOMY      [facility]    COLONOSCOPY N/A 2018    Dr Brian ESCOBEDO (-) dysplasia x 4--6 month recall    COLONOSCOPY N/A 2018    Dr Brian ESCOBEDO (-) dysplasia x 1, HP x 2--3 yr recall    HERNIA REPAIR      TOTAL KNEE ARTHROPLASTY Right     TOTAL KNEE ARTHROPLASTY Left     UPPER GASTROINTESTINAL ENDOSCOPY      [facility]     Current Outpatient Medications   Medication Sig Dispense Refill    amLODIPine-benazepril (LOTREL) 10-20 MG per capsule TAKE ONE CAPSULE BY MOUTH DAILY 90 capsule 1    flecainide (TAMBOCOR) 50 MG tablet Take 50 mg by mouth 2 times daily      rivaroxaban (XARELTO) 15 MG TABS tablet Take 15 mg by mouth daily        No current facility-administered medications for this visit.       Allergies   Allergen Reactions    Statins Other (See Comments)     weak     Social History     Tobacco Use    Smoking status: Former Smoker     Packs/day: 0.50     Years: 20.00     Pack years: 10.00     Types: Cigars     Quit date: 2019     Years since quittin.6    Smokeless tobacco: Never Used    Tobacco comment: Pt only smoked Cigars   Substance Use Topics    Alcohol use: No      Family History   Problem Relation Age of Onset    Colon Cancer Neg Hx     Colon Polyps Neg Hx     Liver Cancer Neg Hx     Liver Disease Neg Hx     Esophageal Cancer Neg Hx     Stomach Cancer Neg Hx     Rectal Cancer Neg Hx        Review of Systems   Constitutional: Positive for fatigue. Negative for chills and fever. HENT: Negative for congestion, ear pain, nosebleeds, postnasal drip and sore throat. Respiratory: Negative for cough, chest tightness and wheezing. Cardiovascular: Negative for chest pain, palpitations and leg swelling. Gastrointestinal: Negative for abdominal pain and constipation. Genitourinary: Negative for dysuria and urgency. Musculoskeletal: Positive for arthralgias. Skin: Negative for rash. Neurological: Negative for dizziness and headaches. Psychiatric/Behavioral: Negative. Vitals:    04/14/21 1027   BP: 128/80   Site: Left Upper Arm   Position: Sitting   Cuff Size: Large Adult   Pulse: 82   Resp: 18   SpO2: 96%   Weight: 233 lb (105.7 kg)   Height: 5' 11\" (1.803 m)     Body mass index is 32.5 kg/m². Physical Exam  Constitutional:       Appearance: He is well-developed. HENT:      Right Ear: External ear normal.      Left Ear: External ear normal.      Mouth/Throat:      Pharynx: No oropharyngeal exudate. Eyes:      Conjunctiva/sclera: Conjunctivae normal.      Pupils: Pupils are equal, round, and reactive to light. Neck:      Musculoskeletal: Neck supple. Thyroid: No thyromegaly. Vascular: No JVD. Cardiovascular:      Rate and Rhythm: Normal rate. Heart sounds: Normal heart sounds. No murmur. Pulmonary:      Effort: No respiratory distress. Breath sounds: Normal breath sounds. No wheezing or rales. Chest:      Chest wall: No tenderness. Abdominal:      General: Bowel sounds are normal.      Palpations: Abdomen is soft. Musculoskeletal: Normal range of motion. Lymphadenopathy:      Cervical: No cervical adenopathy. Skin:     General: Skin is warm. Findings: No rash. Neurological:      Mental Status: He is oriented to person, place, and time.          Lab Review   Orders Only on 04/12/2021   Component Date Value    Cholesterol, Total 04/12/2021 163     Triglycerides 04/12/2021 120     HDL 04/12/2021 37*    LDL Calculated 04/12/2021 102     Hemoglobin A1C 04/12/2021 5.8     Sodium 04/12/2021 138     Potassium 04/12/2021 4.5     Chloride 04/12/2021 102     CO2 04/12/2021 27     Anion Gap 04/12/2021 9     Glucose 04/12/2021 116*    BUN 04/12/2021 19     CREATININE 04/12/2021 1.1     GFR Non- 04/12/2021 >60     GFR  04/12/2021 >59     Calcium 04/12/2021 9.5     Total Protein 04/12/2021 7.0     Albumin 04/12/2021 4.2     Total Bilirubin 04/12/2021 0.3     Alkaline Phosphatase 04/12/2021 83     ALT 04/12/2021 12     AST 04/12/2021 13            ASSESSMENT/PLAN:    Pure hypercholesterolemia  Statin intolerance hx  Lab results reviewed and DW pt in detail  LDL  102 in 4/2021 (98 (117 ( 218)(67)  Had previous issues with statin related myopathy ( tried crestor and liptor)  Vladimir Chatterjeeman his prior history of stroke. This time patient wishes not to try any other lipid-lowering medications     IFG (impaired fasting glucose)   Lab results dw pt in detail  a1c stable 5.8 in 4/2021 ( 5.9 ( 6.0 (6.1)  diet and weight loss recommended     Exogenous obesity  Pt was given counseling about diet and exercise including education on what calories are, where calories come from, the need for portion control,following lower carbohydrate dietary regimen and healthy snacks along side an active lifestyle with supplementary exercise of approx 30 minutes a week, 5 days a week of exercise for weight loss.  The patient voiced increased understanding of the topics discussed.       He has history of PAF  Follows with Dr. Sachin SOTO and history of cryptogenic stroke  Has recently seen Dr. Hermann Eller  He is wearing loop recorder since about a year ago  His echocardiogram that was done just in March 2020  Result Narrative   · No evidence of pulmonary hypertension is present. · Estimated EF = 65%.   · Left ventricular systolic function is normal.  · Left ventricular diastolic dysfunction. · Mild dilation of the aortic root is present.         Aortic root dilatation  CT 10/2020  View ordered is unremarkable at approximately 42 mm in maximal  dimension in the ascending aorta  As per dr Holly Morton hypertension  Blood pressure readings reviewed  he will continue amlodipine 10, benazepril 20         CAROLEE on CPAP  Doing well  Cont current settings     Post CVA  Residual mild LEFT LE weakness  Stable  Again, unable to take statin/statin intolerance         Lung nodules:  CT 10/2020  Lungs: Centrilobular emphysematous changes present. Small pulmonary  nodules are present. 4 mm nodules present on image 81 in the periphery  of the right lung. 6 mm nodule is present on image 89 in the right lower  lobe  7 mm nodule is present in the right lung on image 95  Calcified granulomas are present in the right and left lung. 5 mm  nodules present left upper lobe on image 57 mm nodule is present on  image 86  5 mm nodule noted in the left lower lobe on image 110. Mild pleural  thickening is noted in the left chest posteriorly. . The trachea and  bronchial tree are patent. Repeat CT is recommended      Elevated PSA  Urology referral recommended 10/2020  Pt never made appt/ refused to see urology  Repeat psa today per pt request    Orders Placed This Encounter   Procedures    Hepatitis C Antibody    CBC Auto Differential    Comprehensive Metabolic Panel    Hemoglobin A1C    Lipid Panel    Urinalysis    TSH without Reflex    PSA, Diagnostic    PSA, Diagnostic     New Prescriptions    No medications on file         No follow-ups on file. There are no Patient Instructions on file for this visit. EMR Dragon/transcription disclaimer:Significant part of this  encounter note is electronic transcription/translationof spoken language to printed text.  The electronic translation of spoken language may be erroneous, or at times, nonsensical words or phrases may be inadvertently transcribed.  Although I have reviewed the note for sucherrors, some may still exist.

## 2021-04-21 ENCOUNTER — HOSPITAL ENCOUNTER (OUTPATIENT)
Dept: GENERAL RADIOLOGY | Age: 76
Discharge: HOME | End: 2021-04-21
Payer: MEDICARE

## 2021-04-21 DIAGNOSIS — R91.8 LUNG NODULES: ICD-10-CM

## 2021-04-21 DIAGNOSIS — R91.8 MULTIPLE LUNG NODULES ON CT: Primary | ICD-10-CM

## 2021-04-21 PROCEDURE — 71260 CT THORAX DX C+: CPT

## 2021-04-21 PROCEDURE — 6360000004 HC RX CONTRAST MEDICATION: Performed by: INTERNAL MEDICINE

## 2021-04-21 RX ADMIN — IOPAMIDOL 75 ML: 755 INJECTION, SOLUTION INTRAVENOUS at 11:45

## 2021-09-24 ENCOUNTER — TELEPHONE (OUTPATIENT)
Dept: INTERNAL MEDICINE | Age: 76
End: 2021-09-24

## 2021-09-24 NOTE — TELEPHONE ENCOUNTER
Pt called in requesting to get rx for hose and mask for his cpap.  Needs letter stating that he is still on cpap machine    Please call and advise

## 2021-10-11 ENCOUNTER — HOSPITAL ENCOUNTER (OUTPATIENT)
Dept: GENERAL RADIOLOGY | Age: 76
Discharge: HOME | End: 2021-10-11
Payer: MEDICARE

## 2021-10-11 DIAGNOSIS — R91.8 MULTIPLE LUNG NODULES ON CT: ICD-10-CM

## 2021-10-11 PROCEDURE — 6360000004 HC RX CONTRAST MEDICATION: Performed by: INTERNAL MEDICINE

## 2021-10-11 PROCEDURE — 71260 CT THORAX DX C+: CPT

## 2021-10-11 RX ADMIN — IOPAMIDOL 85 ML: 755 INJECTION, SOLUTION INTRAVENOUS at 09:30

## 2021-10-15 ENCOUNTER — OFFICE VISIT (OUTPATIENT)
Dept: INTERNAL MEDICINE | Age: 76
End: 2021-10-15
Payer: MEDICARE

## 2021-10-15 VITALS
OXYGEN SATURATION: 99 % | HEIGHT: 71 IN | SYSTOLIC BLOOD PRESSURE: 122 MMHG | WEIGHT: 229 LBS | HEART RATE: 85 BPM | BODY MASS INDEX: 32.06 KG/M2 | DIASTOLIC BLOOD PRESSURE: 80 MMHG | RESPIRATION RATE: 18 BRPM

## 2021-10-15 DIAGNOSIS — G47.33 OSA ON CPAP: ICD-10-CM

## 2021-10-15 DIAGNOSIS — D73.89 NODULE OF SPLEEN: ICD-10-CM

## 2021-10-15 DIAGNOSIS — Z86.73 CEREBRAL ARTERIOSCLEROSIS WITH HISTORY OF PREVIOUS STROKE: ICD-10-CM

## 2021-10-15 DIAGNOSIS — R97.20 ELEVATED PSA: ICD-10-CM

## 2021-10-15 DIAGNOSIS — Z11.59 NEED FOR HEPATITIS C SCREENING TEST: ICD-10-CM

## 2021-10-15 DIAGNOSIS — E66.09 EXOGENOUS OBESITY: ICD-10-CM

## 2021-10-15 DIAGNOSIS — G81.94 LEFT HEMIPARESIS (HCC): ICD-10-CM

## 2021-10-15 DIAGNOSIS — Z99.89 OSA ON CPAP: ICD-10-CM

## 2021-10-15 DIAGNOSIS — I10 ESSENTIAL HYPERTENSION: ICD-10-CM

## 2021-10-15 DIAGNOSIS — I67.2 CEREBRAL ARTERIOSCLEROSIS WITH HISTORY OF PREVIOUS STROKE: ICD-10-CM

## 2021-10-15 DIAGNOSIS — R91.8 MULTIPLE LUNG NODULES ON CT: ICD-10-CM

## 2021-10-15 DIAGNOSIS — Z00.00 ROUTINE GENERAL MEDICAL EXAMINATION AT A HEALTH CARE FACILITY: ICD-10-CM

## 2021-10-15 DIAGNOSIS — I77.89 AORTIC ROOT ENLARGEMENT (HCC): ICD-10-CM

## 2021-10-15 DIAGNOSIS — E78.00 PURE HYPERCHOLESTEROLEMIA: ICD-10-CM

## 2021-10-15 DIAGNOSIS — R73.01 IFG (IMPAIRED FASTING GLUCOSE): ICD-10-CM

## 2021-10-15 DIAGNOSIS — E04.1 THYROID NODULE: ICD-10-CM

## 2021-10-15 DIAGNOSIS — I48.0 PAF (PAROXYSMAL ATRIAL FIBRILLATION) (HCC): ICD-10-CM

## 2021-10-15 DIAGNOSIS — Z78.9 STATIN INTOLERANCE: ICD-10-CM

## 2021-10-15 DIAGNOSIS — Z00.00 MEDICARE ANNUAL WELLNESS VISIT, SUBSEQUENT: Primary | ICD-10-CM

## 2021-10-15 LAB
BILIRUBIN URINE: NEGATIVE
BLOOD, URINE: NEGATIVE
CLARITY: CLEAR
COLOR: YELLOW
GLUCOSE URINE: NEGATIVE MG/DL
KETONES, URINE: NEGATIVE MG/DL
LEUKOCYTE ESTERASE, URINE: NEGATIVE
NITRITE, URINE: NEGATIVE
PH UA: 5 (ref 5–8)
PROTEIN UA: ABNORMAL MG/DL
SPECIFIC GRAVITY UA: 1.02 (ref 1–1.03)
UROBILINOGEN, URINE: 1 E.U./DL

## 2021-10-15 PROCEDURE — 1123F ACP DISCUSS/DSCN MKR DOCD: CPT | Performed by: INTERNAL MEDICINE

## 2021-10-15 PROCEDURE — G8417 CALC BMI ABV UP PARAM F/U: HCPCS | Performed by: INTERNAL MEDICINE

## 2021-10-15 PROCEDURE — 1036F TOBACCO NON-USER: CPT | Performed by: INTERNAL MEDICINE

## 2021-10-15 PROCEDURE — 4040F PNEUMOC VAC/ADMIN/RCVD: CPT | Performed by: INTERNAL MEDICINE

## 2021-10-15 PROCEDURE — G8427 DOCREV CUR MEDS BY ELIG CLIN: HCPCS | Performed by: INTERNAL MEDICINE

## 2021-10-15 PROCEDURE — G0439 PPPS, SUBSEQ VISIT: HCPCS | Performed by: INTERNAL MEDICINE

## 2021-10-15 PROCEDURE — 99214 OFFICE O/P EST MOD 30 MIN: CPT | Performed by: INTERNAL MEDICINE

## 2021-10-15 PROCEDURE — G8484 FLU IMMUNIZE NO ADMIN: HCPCS | Performed by: INTERNAL MEDICINE

## 2021-10-15 SDOH — ECONOMIC STABILITY: FOOD INSECURITY: WITHIN THE PAST 12 MONTHS, THE FOOD YOU BOUGHT JUST DIDN'T LAST AND YOU DIDN'T HAVE MONEY TO GET MORE.: NEVER TRUE

## 2021-10-15 SDOH — ECONOMIC STABILITY: FOOD INSECURITY: WITHIN THE PAST 12 MONTHS, YOU WORRIED THAT YOUR FOOD WOULD RUN OUT BEFORE YOU GOT MONEY TO BUY MORE.: NEVER TRUE

## 2021-10-15 ASSESSMENT — SOCIAL DETERMINANTS OF HEALTH (SDOH): HOW HARD IS IT FOR YOU TO PAY FOR THE VERY BASICS LIKE FOOD, HOUSING, MEDICAL CARE, AND HEATING?: NOT HARD AT ALL

## 2021-10-15 ASSESSMENT — PATIENT HEALTH QUESTIONNAIRE - PHQ9
SUM OF ALL RESPONSES TO PHQ QUESTIONS 1-9: 0
SUM OF ALL RESPONSES TO PHQ QUESTIONS 1-9: 0
1. LITTLE INTEREST OR PLEASURE IN DOING THINGS: 0
SUM OF ALL RESPONSES TO PHQ9 QUESTIONS 1 & 2: 0
2. FEELING DOWN, DEPRESSED OR HOPELESS: 0
SUM OF ALL RESPONSES TO PHQ QUESTIONS 1-9: 0

## 2021-10-15 ASSESSMENT — ENCOUNTER SYMPTOMS
CONSTIPATION: 0
ABDOMINAL PAIN: 0
WHEEZING: 0
COUGH: 0
SORE THROAT: 0
CHEST TIGHTNESS: 0

## 2021-10-15 ASSESSMENT — LIFESTYLE VARIABLES: HOW OFTEN DO YOU HAVE A DRINK CONTAINING ALCOHOL: 0

## 2021-10-15 NOTE — PROGRESS NOTES
Medicare Annual Wellness Visit  Name: Adan Suero Date: 10/15/2021   MRN: [id number] Sex: Male   Age: 66 y.o. Ethnicity: Non- / Non    : 1945 Race: White (non-)      Colin Londono is here for Medicare AWV    Screenings for behavioral, psychosocial and functional/safety risks, and cognitive dysfunction are all negative except as indicated below. These results, as well as other patient data from the [address] form, are documented in Flowsheets linked to this Encounter. Allergies   Allergen Reactions    Statins Other (See Comments)     weak       Prior to Visit Medications    Medication Sig Taking?  Authorizing Provider   amLODIPine-benazepril (LOTREL) 10-20 MG per capsule TAKE ONE CAPSULE BY MOUTH DAILY Yes Laura Lacey MD   rivaroxaban (XARELTO) 20 MG TABS tablet Take 20 mg by mouth daily  Yes Historical Provider, MD       Past Medical History:   Diagnosis Date    Arthritis     GERD (gastroesophageal reflux disease)     Hypertension     Lumbar compression fracture, sequela         CAROLEE on CPAP     PAF (paroxysmal atrial fibrillation) ([address]     Stroke ([address] [id number]    minis stroke about 5 years ago as well, both on the left side    Stroke ([address]     10 years ago, weakness on left side       Past Surgical History:   Procedure Laterality Date    CHOLECYSTECTOMY      COLONOSCOPY      [employer]aptist    COLONOSCOPY N/A 2018    Dr Rachael ESCOBEDO (-) dysplasia x 4--6 month recall    COLONOSCOPY N/A 2018    Dr Rachael ESCOBEDO (-) dysplasia x 1, HP x 2--3 yr recall    HERNIA REPAIR      TOTAL KNEE ARTHROPLASTY Right     TOTAL KNEE ARTHROPLASTY Left     UPPER GASTROINTESTINAL ENDOSCOPY      Anabaptist       Family History   Problem Relation Age of Onset    Colon Cancer Neg Hx     Colon Polyps Neg Hx     Liver Cancer Neg Hx     Liver Disease Neg Hx     Esophageal Cancer Neg Hx     Stomach Cancer Neg Hx     Rectal Cancer Neg Hx        CareTeam (Including outside providers/suppliers regularly involved in providing care):   Patient Care Team:  Milagros Sosa MD as PCP - General (Internal Medicine)  Milagros Sosa MD as PCP - REHABILITATION [facility]  SMILEY Brito as Advanced Practice Nurse (Neurology)    Wt Readings from Last 3 Encounters:   10/15/21 229 lb (103.9 kg)   04/14/21 233 lb (105.7 kg)   10/14/20 232 lb (105.2 kg)     Vitals:    10/15/21 1022   BP: 122/80   Site: Left Upper Arm   Position: Sitting   Cuff Size: Large Adult   Pulse: 85   Resp: 18   SpO2: 99%   Weight: 229 lb (103.9 kg)   Height: 5' 11\" (1.803 m)     Body mass index is 31.94 kg/m². Based upon direct observation of the patient, evaluation of cognition reveals recent and remote memory intact. Patient's complete Health Risk Assessment and screening values have been reviewed and are found in Flowsheets. The following problems were reviewed today and where indicated follow up appointments were made and/or referrals ordered. Positive Risk Factor Screenings with Interventions:          General Health and ACP:  General  In general, how would you say your health is?: Good  In the past 7 days, have you experienced any of the following?  New or Increased Pain, New or Increased Fatigue, Loneliness, Social Isolation, Stress or Anger?: None of These  Do you get the social and emotional support that you need?: Yes  Do you have a Living Will?: (!) No  Advance Directives     Power of [address] [place] [address] Will ACP-Advance Directive ACP-Power of     Not on File Not on File Not on File Not on File      General Health Risk Interventions:  · No Living Will: Advance Care Planning addressed with patient today    Health Habits/Nutrition:  Health Habits/Nutrition  Do you exercise for at least 20 minutes 2-3 times per week?: Yes  Have you lost any weight without trying in the past 3 months?: No  Do you eat only one meal per day?: No  Have you seen the dentist within the SCREEN REVIEWED- NO INCREASED FALL RISK ON EXAM     Patient Instructions     Personalized Preventive Plan for Kate Reno - 10/15/2021  Medicare offers a range of preventive health benefits. Some of the tests and screenings are paid in full while other may be subject to a deductible, co-insurance, and/or copay. Some of these benefits include a comprehensive review of your medical history including lifestyle, illnesses that may run in your family, and various assessments and screenings as appropriate. After reviewing your medical record and screening and assessments performed today your provider may have ordered immunizations, labs, imaging, and/or referrals for you. A list of these orders (if applicable) as well as your Preventive Care list are included within your After Visit Summary for your review. Other Preventive Recommendations:    · A preventive eye exam performed by an eye specialist is recommended every 1-2 years to screen for glaucoma; cataracts, macular degeneration, and other eye disorders. · A preventive dental visit is recommended every 6 months. · Try to get at least 150 minutes of exercise per week or 10,000 steps per day on a pedometer . · Order or download the FREE \"Exercise & Physical Activity: Your Everyday Guide\" from [employer]. Call 4-140.944.7846 or search [employer] online. · You need 3377-1130 mg of calcium and 2156-9264 IU of vitamin D per day. It is possible to meet your calcium requirement with diet alone, but a vitamin D supplement is usually necessary to meet this goal.  · When exposed to the sun, use a sunscreen that protects against both UVA and UVB radiation with an SPF of 30 or greater. Reapply every 2 to 3 hours or after sweating, drying off with a towel, or swimming. · Always wear a seat belt when traveling in a car. Always wear a helmet when riding a bicycle or motorcycle.

## 2021-10-15 NOTE — PROGRESS NOTES
Chief Complaint   Patient presents with     Multiple medical problems     History of presenting illness:  Bismark Childs is a63 y.o. male who presents today for follow up on his chronic medical conditions as noted below.       Patient Active Problem List    Diagnosis Date Noted    Aortic root enlargement ([address] 10/14/2020    Statin intolerance 04/09/2019    Weakness 10/19/2018    Gait abnormality 10/19/2018    History of stroke 10/19/2018    Low back pain 10/19/2018    Hemorrhoids 09/24/2018    Elevated PSA 09/18/2018    PAF (paroxysmal atrial fibrillation) ([address] 09/18/2018    Lumbar compression fracture, sequela 09/18/2018    Left hemiparesis ([address] 04/11/2018    Essential hypertension 03/13/2018    Pure hypercholesterolemia 03/13/2018    CAROLEE on CPAP 03/13/2018    Cerebrovascular disease 03/13/2018    Cerebral arteriosclerosis with history of previous stroke 03/13/2018    IFG (impaired fasting glucose) 03/13/2018    Exogenous obesity 03/13/2018    Atypical chest pain 12/28/2016     Past Medical History:   Diagnosis Date    Arthritis     GERD (gastroesophageal reflux disease)     Hypertension     Lumbar compression fracture, sequela     1980    CAROLEE on CPAP     PAF (paroxysmal atrial fibrillation) ([address]     Stroke ([address] [id number]    minis stroke about 5 years ago as well, both on the left side    Stroke ([address]     10 years ago, weakness on left side      Past Surgical History:   Procedure Laterality Date    CHOLECYSTECTOMY      COLONOSCOPY  2008    Jew    COLONOSCOPY N/A 6/25/2018    Dr Judge Jono ESCOBEDO (-) dysplasia x 4--6 month recall    COLONOSCOPY N/A 12/12/2018    Dr Judge Jono ESCOBEDO (-) dysplasia x 1, HP x 2--3 yr recall    HERNIA REPAIR      TOTAL KNEE ARTHROPLASTY Right     TOTAL KNEE ARTHROPLASTY Left     UPPER GASTROINTESTINAL ENDOSCOPY  2008    Jew     Current Outpatient Medications   Medication Sig Dispense Refill    amLODIPine-benazepril (Jennifer Mercado) 10-20 MG per capsule TAKE ONE CAPSULE BY MOUTH DAILY 90 capsule 1    rivaroxaban (XARELTO) 20 MG TABS tablet Take 20 mg by mouth daily        No current facility-administered medications for this visit. Allergies   Allergen Reactions    Statins Other (See Comments)     weak     Social History     Tobacco Use    Smoking status: Former Smoker     Packs/day: 0.50     Years: 20.00     Pack years: 10.00     Types: Cigars     Quit date: 2019     Years since quittin.1    Smokeless tobacco: Never Used    Tobacco comment: Pt only smoked Cigars   Substance Use Topics    Alcohol use: No      Family History   Problem Relation Age of Onset    Colon Cancer Neg Hx     Colon Polyps Neg Hx     Liver Cancer Neg Hx     Liver Disease Neg Hx     Esophageal Cancer Neg Hx     Stomach Cancer Neg Hx     Rectal Cancer Neg Hx        Review of Systems   Constitutional: Positive for fatigue. Negative for chills and fever. HENT: Negative for congestion, ear pain, nosebleeds, postnasal drip and sore throat. Respiratory: Negative for cough, chest tightness and wheezing. Cardiovascular: Negative for chest pain, palpitations and leg swelling. Gastrointestinal: Negative for abdominal pain and constipation. Genitourinary: Negative for dysuria and urgency. Musculoskeletal: Negative. Negative for arthralgias. Skin: Negative for rash. Neurological: Positive for weakness. Negative for dizziness and headaches. Psychiatric/Behavioral: Positive for sleep disturbance. Vitals:    10/15/21 1022   BP: 122/80   Site: Left Upper Arm   Position: Sitting   Cuff Size: Large Adult   Pulse: 85   Resp: 18   SpO2: 99%   Weight: 229 lb (103.9 kg)   Height: 5' 11\" (1.803 m)     Body mass index is 31.94 kg/m². Physical Exam  Constitutional:       Appearance: He is well-developed. HENT:      Right Ear: External ear normal.      Left Ear: External ear normal.      Mouth/Throat:      Pharynx: No oropharyngeal exudate.    Eyes: Conjunctiva/sclera: Conjunctivae normal.      Pupils: Pupils are equal, round, and reactive to light. Neck:      Thyroid: No thyromegaly. Vascular: No JVD. Cardiovascular:      Rate and Rhythm: Normal rate. Heart sounds: Normal heart sounds. No murmur heard. Pulmonary:      Effort: No respiratory distress. Breath sounds: Normal breath sounds. No wheezing or rales. Chest:      Chest wall: No tenderness. Abdominal:      General: Bowel sounds are normal.      Palpations: Abdomen is soft. Musculoskeletal:      Cervical back: Neck supple. Lymphadenopathy:      Cervical: No cervical adenopathy. Skin:     Findings: No rash. Neurological:      Mental Status: He is oriented to person, place, and time.          Lab Review   Orders Only on 10/11/2021   Component Date Value    PSA 10/11/2021 4.49*    TSH 10/11/2021 0.827     Cholesterol, Total 10/11/2021 176     Triglycerides 10/11/2021 156*    HDL 10/11/2021 35*    LDL Calculated 10/11/2021 110     Hemoglobin A1C 10/11/2021 5.9     Sodium 10/11/2021 141     Potassium 10/11/2021 4.0     Chloride 10/11/2021 103     CO2 10/11/2021 25     Anion Gap 10/11/2021 13     Glucose 10/11/2021 109     BUN 10/11/2021 16     CREATININE 10/11/2021 1.0     GFR Non- 10/11/2021 >60     GFR  10/11/2021 >59     Calcium 10/11/2021 9.8     Total Protein 10/11/2021 7.4     Albumin 10/11/2021 4.3     Total Bilirubin 10/11/2021 0.4     Alkaline Phosphatase 10/11/2021 88     ALT 10/11/2021 10     AST 10/11/2021 10     WBC 10/11/2021 6.3     RBC 10/11/2021 4.97     Hemoglobin 10/11/2021 14.3     Hematocrit 10/11/2021 45.4     MCV 10/11/2021 91.3     MCH 10/11/2021 28.8     MCHC 10/11/2021 31.5*    RDW 10/11/2021 12.9     Platelets 61/12/9450 207     MPV 10/11/2021 10.1     Neutrophils % 10/11/2021 68.5*    Lymphocytes % 10/11/2021 15.8*    Monocytes % 10/11/2021 10.1*    Eosinophils % 10/11/2021 3. 9     Basophils % 10/11/2021 0.9     Neutrophils Absolute 10/11/2021 4.3     Immature Granulocytes # 10/11/2021 0.1     Lymphocytes Absolute 10/11/2021 1.0*    Monocytes Absolute 10/11/2021 0.60     Eosinophils Absolute 10/11/2021 0.30     Basophils Absolute 10/11/2021 0.10     Hep C Ab Interp 10/11/2021 Non-Reactive            ASSESSMENT/PLAN:    Pure hypercholesterolemia  Statin intolerance hx  Lab results reviewed and DW pt in detail  LDL 110 in 10/2021, higher   Triglycerides elevated at 156  Had previous issues with statin related myopathy ( tried crestor and liptor)  Tatum Shank his prior history of stroke. This time patient wishes not to try any other lipid-lowering medications  Healthy, mostly fiber rich nonstarchy plant-based diet recommended  Recommend to decrease intake of processed foods, simple carbohydrates and animal-based products that high in saturated fats       IFG (impaired fasting glucose)   Lab results dw pt in detail  a1c higher at 5.9 in 10/2021 ( 5.8 in 4/2021 ( 5.9 )  diet and weight loss recommended     Exogenous obesity  Pt was given counseling about diet and exercise including education on what calories are, where calories come from, the need for portion control,following lower carbohydrate dietary regimen and healthy snacks along side an active lifestyle with supplementary exercise of approx 30 minutes a week, 5 days a week of exercise for weight loss.  The patient voiced increased understanding of the topics discussed.       He has history of PAF  Follows with Dr. Danyelle Lucero PAF and history of cryptogenic stroke  Has recently seen Dr. Heri Simms  He is wearing loop recorder since about a year ago  His echocardiogram that was done just in March 2020  Result Narrative   · No evidence of pulmonary hypertension is present. · Estimated EF = 65%. · Left ventricular systolic function is normal.  · Left ventricular diastolic dysfunction.   · Mild dilation of the aortic root is present.         Aortic root dilatation  CT 10/2020  View ordered is unremarkable at approximately 42 mm in maximal  dimension in the ascending aorta  As per dr Rafi Dolan hypertension  Blood pressure readings reviewed  he will continue amlodipine 10, benazepril 20         CAROLEE on CPAP  Doing well  Cont current settings     Post CVA  Residual mild LEFT LE weakness  Stable  Again, unable to take statin/statin intolerance         Lung nodules:  CT 10/2020  Lungs: Centrilobular emphysematous changes present. Small pulmonary  nodules are present. 4 mm nodules present on image 81 in the periphery  of the right lung. 6 mm nodule is present on image 89 in the right lower  lobe  7 mm nodule is present in the right lung on image 95  Calcified granulomas are present in the right and left lung. 5 mm  nodules present left upper lobe on image 57 mm nodule is present on  image 86  5 mm nodule noted in the left lower lobe on image 110. Mild pleural  thickening is noted in the left chest posteriorly. . The trachea and  bronchial tree are patent.   Repeat CT 10/2021:  CT results  Multiple lung nodules present  Some have decreased in size and number of nodules have increased in size.  No new nodules  Since several nodules have increased compared to previous CT scan suggest pulmonology appointment for opinion, nonurgent  Orders placed    Thyroid nodule per CT:  large heterogeneous partially calcified mass in the right  lobe of the thyroid gland measuring 0.2 x 2.9 cm  Ultrasound of the thyroid is recommended  Orders placed    New low-density nodule in the spleen, new finding on CT, incidental  Ultrasound of spleen is recommended  Orders placed    Mild ectasia ( widening )of the  ascending thoracic aorta which measures 4 cm -repeat study in 1 year is recommended-as per cardiology        Elevated PSA  Urology referral recommended 10/2020  Pt never made appt/ refused to see urology  PSA results  10/2021 4.49  April 2021 5.46  October 2021 5.93  Patient refuses at this time to see urology for this problem understands recommendation is still there               Orders Placed This Encounter   Procedures    US ABDOMEN LIMITED    US THYROID    PSA, Diagnostic    Hemoglobin A1C    Lipid Panel    Comprehensive Metabolic Panel     New Prescriptions    No medications on file         Return in 6 months (on 4/15/2022) for Medicare Annual Wellness Visit in 1 year, Medication check. EMR Dragon/transcription disclaimer:Significant part of this  encounter note is electronic transcription/translationof spoken language to printed text. The electronic translation of spoken language may be erroneous, or at times, nonsensical words or phrases may be inadvertently transcribed.  Although I have reviewed the note for sucherrors, some may still exist.

## 2021-10-15 NOTE — PATIENT INSTRUCTIONS
Personalized Preventive Plan for Laurette Cheadle - 10/15/2021  Medicare offers a range of preventive health benefits. Some of the tests and screenings are paid in full while other may be subject to a deductible, co-insurance, and/or copay. Some of these benefits include a comprehensive review of your medical history including lifestyle, illnesses that may run in your family, and various assessments and screenings as appropriate. After reviewing your medical record and screening and assessments performed today your provider may have ordered immunizations, labs, imaging, and/or referrals for you. A list of these orders (if applicable) as well as your Preventive Care list are included within your After Visit Summary for your review. Other Preventive Recommendations:    · A preventive eye exam performed by an eye specialist is recommended every 1-2 years to screen for glaucoma; cataracts, macular degeneration, and other eye disorders. · A preventive dental visit is recommended every 6 months. · Try to get at least 150 minutes of exercise per week or 10,000 steps per day on a pedometer . · Order or download the FREE \"Exercise & Physical Activity: Your Everyday Guide\" from [employer]. Call 3-587.280.5848 or search [employer] online. · You need 5773-4972 mg of calcium and 8278-7787 IU of vitamin D per day. It is possible to meet your calcium requirement with diet alone, but a vitamin D supplement is usually necessary to meet this goal.  · When exposed to the sun, use a sunscreen that protects against both UVA and UVB radiation with an SPF of 30 or greater. Reapply every 2 to 3 hours or after sweating, drying off with a towel, or swimming. · Always wear a seat belt when traveling in a car. Always wear a helmet when riding a bicycle or motorcycle.

## 2021-10-20 ENCOUNTER — HOSPITAL ENCOUNTER (OUTPATIENT)
Dept: GENERAL RADIOLOGY | Age: 76
Discharge: HOME | End: 2021-10-20
Payer: MEDICARE

## 2021-10-20 DIAGNOSIS — E04.1 THYROID NODULE: ICD-10-CM

## 2021-10-20 DIAGNOSIS — D73.89 NODULE OF SPLEEN: ICD-10-CM

## 2021-10-20 PROCEDURE — 76705 ECHO EXAM OF ABDOMEN: CPT

## 2021-10-20 PROCEDURE — 76536 US EXAM OF HEAD AND NECK: CPT

## 2021-11-04 DIAGNOSIS — D18.03 HEMANGIOMA OF SPLEEN: Primary | ICD-10-CM

## 2021-11-04 DIAGNOSIS — E04.1 THYROID NODULE: ICD-10-CM

## 2022-01-11 RX ORDER — AMLODIPINE BESYLATE AND BENAZEPRIL HYDROCHLORIDE 10; 20 MG/1; MG/1
CAPSULE ORAL
Qty: 90 CAPSULE | Refills: 1 | Status: SHIPPED | OUTPATIENT
Start: 2022-01-11 | End: 2022-04-12

## 2022-04-08 ENCOUNTER — TELEPHONE (OUTPATIENT)
Dept: INTERNAL MEDICINE | Age: 77
End: 2022-04-08

## 2022-04-08 NOTE — TELEPHONE ENCOUNTER
Patient called in because he is needing a new CPAP machine. His is only working correctly about 50% of the time. He gets his supplies through [facility]. with [facility] and they advised that they will need chart notes within the last 6 months stating he is needing a new machine. Added needing cpap to appt notes. Called patient and he advised he could wait until his appointment with you on the 18th.      After this appointment new chart notes do need to be faxed to [facility]. Spoke to daughter / Felisha.  Informed her of Laura's recommendation.  She will take her mother to [facility].

## 2022-04-12 RX ORDER — AMLODIPINE BESYLATE AND BENAZEPRIL HYDROCHLORIDE 10; 20 MG/1; MG/1
CAPSULE ORAL
Qty: 90 CAPSULE | Refills: 1 | Status: SHIPPED | OUTPATIENT
Start: 2022-04-12 | End: 2022-07-18

## 2022-04-12 NOTE — TELEPHONE ENCOUNTER
Samantha Huff called requesting a refill of the below medication which has been pended for you:     Requested Prescriptions     Pending Prescriptions Disp Refills    amLODIPine-benazepril (LOTREL) 10-20 MG per capsule [Pharmacy Med Name: AMLODIPINE-BENAZEPRIL 10-20 10-20 Capsule] 90 capsule 1     Sig: TAKE ONE CAPSULE BY MOUTH DAILY       Last Appointment Date: 10/15/2021  Next Appointment Date: 4/18/2022    Allergies   Allergen Reactions    Statins Other (See Comments)     weak

## 2022-04-14 DIAGNOSIS — R97.20 ELEVATED PSA: ICD-10-CM

## 2022-04-14 DIAGNOSIS — I48.0 PAF (PAROXYSMAL ATRIAL FIBRILLATION) (HCC): ICD-10-CM

## 2022-04-14 DIAGNOSIS — Z99.89 OSA ON CPAP: ICD-10-CM

## 2022-04-14 DIAGNOSIS — E04.1 THYROID NODULE: ICD-10-CM

## 2022-04-14 DIAGNOSIS — I77.89 AORTIC ROOT ENLARGEMENT (HCC): ICD-10-CM

## 2022-04-14 DIAGNOSIS — I67.2 CEREBRAL ARTERIOSCLEROSIS WITH HISTORY OF PREVIOUS STROKE: ICD-10-CM

## 2022-04-14 DIAGNOSIS — Z00.00 MEDICARE ANNUAL WELLNESS VISIT, SUBSEQUENT: ICD-10-CM

## 2022-04-14 DIAGNOSIS — E66.09 EXOGENOUS OBESITY: ICD-10-CM

## 2022-04-14 DIAGNOSIS — R73.01 IFG (IMPAIRED FASTING GLUCOSE): ICD-10-CM

## 2022-04-14 DIAGNOSIS — Z78.9 STATIN INTOLERANCE: ICD-10-CM

## 2022-04-14 DIAGNOSIS — I10 ESSENTIAL HYPERTENSION: ICD-10-CM

## 2022-04-14 DIAGNOSIS — E78.00 PURE HYPERCHOLESTEROLEMIA: ICD-10-CM

## 2022-04-14 DIAGNOSIS — D73.89 NODULE OF SPLEEN: ICD-10-CM

## 2022-04-14 DIAGNOSIS — Z86.73 CEREBRAL ARTERIOSCLEROSIS WITH HISTORY OF PREVIOUS STROKE: ICD-10-CM

## 2022-04-14 DIAGNOSIS — G47.33 OSA ON CPAP: ICD-10-CM

## 2022-04-14 DIAGNOSIS — R91.8 MULTIPLE LUNG NODULES ON CT: ICD-10-CM

## 2022-04-14 LAB
ALBUMIN SERPL-MCNC: 4.6 G/DL (ref 3.5–5.2)
ALP BLD-CCNC: 89 U/L (ref 40–130)
ALT SERPL-CCNC: 11 U/L (ref 5–41)
ANION GAP SERPL CALCULATED.3IONS-SCNC: 16 MMOL/L (ref 7–19)
AST SERPL-CCNC: 10 U/L (ref 5–40)
BILIRUB SERPL-MCNC: 0.4 MG/DL (ref 0.2–1.2)
BUN BLDV-MCNC: 22 MG/DL (ref 8–23)
CALCIUM SERPL-MCNC: 9.9 MG/DL (ref 8.8–10.2)
CHLORIDE BLD-SCNC: 102 MMOL/L (ref 98–111)
CHOLESTEROL, TOTAL: 180 MG/DL (ref 160–199)
CO2: 26 MMOL/L (ref 22–29)
CREAT SERPL-MCNC: 1.1 MG/DL (ref 0.5–1.2)
GFR AFRICAN AMERICAN: >59
GFR NON-AFRICAN AMERICAN: >60
GLUCOSE BLD-MCNC: 101 MG/DL (ref 74–109)
HBA1C MFR BLD: 5.9 % (ref 4–6)
HDLC SERPL-MCNC: 42 MG/DL (ref 55–121)
LDL CHOLESTEROL CALCULATED: 109 MG/DL
POTASSIUM SERPL-SCNC: 4.5 MMOL/L (ref 3.5–5)
PROSTATE SPECIFIC ANTIGEN: 8.57 NG/ML (ref 0–4)
SODIUM BLD-SCNC: 144 MMOL/L (ref 136–145)
TOTAL PROTEIN: 6.6 G/DL (ref 6.6–8.7)
TRIGL SERPL-MCNC: 145 MG/DL (ref 0–149)

## 2022-04-18 ENCOUNTER — OFFICE VISIT (OUTPATIENT)
Dept: INTERNAL MEDICINE | Age: 77
End: 2022-04-18
Payer: MEDICARE

## 2022-04-18 VITALS
HEIGHT: 71 IN | HEART RATE: 75 BPM | BODY MASS INDEX: 32.48 KG/M2 | OXYGEN SATURATION: 97 % | SYSTOLIC BLOOD PRESSURE: 110 MMHG | DIASTOLIC BLOOD PRESSURE: 70 MMHG | WEIGHT: 232 LBS | RESPIRATION RATE: 18 BRPM

## 2022-04-18 DIAGNOSIS — R91.8 MULTIPLE LUNG NODULES ON CT: ICD-10-CM

## 2022-04-18 DIAGNOSIS — R73.01 IFG (IMPAIRED FASTING GLUCOSE): ICD-10-CM

## 2022-04-18 DIAGNOSIS — R97.20 ELEVATED PSA: ICD-10-CM

## 2022-04-18 DIAGNOSIS — D73.89 NODULE OF SPLEEN: ICD-10-CM

## 2022-04-18 DIAGNOSIS — I48.0 PAF (PAROXYSMAL ATRIAL FIBRILLATION) (HCC): ICD-10-CM

## 2022-04-18 DIAGNOSIS — G47.33 OSA ON CPAP: ICD-10-CM

## 2022-04-18 DIAGNOSIS — Z99.89 OSA ON CPAP: ICD-10-CM

## 2022-04-18 DIAGNOSIS — Z78.9 STATIN INTOLERANCE: ICD-10-CM

## 2022-04-18 DIAGNOSIS — E04.1 THYROID NODULE: ICD-10-CM

## 2022-04-18 DIAGNOSIS — E78.00 PURE HYPERCHOLESTEROLEMIA: ICD-10-CM

## 2022-04-18 DIAGNOSIS — I77.89 AORTIC ROOT ENLARGEMENT (HCC): ICD-10-CM

## 2022-04-18 DIAGNOSIS — G81.94 LEFT HEMIPARESIS (HCC): ICD-10-CM

## 2022-04-18 DIAGNOSIS — I10 ESSENTIAL HYPERTENSION: Primary | ICD-10-CM

## 2022-04-18 PROCEDURE — G8417 CALC BMI ABV UP PARAM F/U: HCPCS | Performed by: INTERNAL MEDICINE

## 2022-04-18 PROCEDURE — 99214 OFFICE O/P EST MOD 30 MIN: CPT | Performed by: INTERNAL MEDICINE

## 2022-04-18 PROCEDURE — 1036F TOBACCO NON-USER: CPT | Performed by: INTERNAL MEDICINE

## 2022-04-18 PROCEDURE — 1123F ACP DISCUSS/DSCN MKR DOCD: CPT | Performed by: INTERNAL MEDICINE

## 2022-04-18 PROCEDURE — G8427 DOCREV CUR MEDS BY ELIG CLIN: HCPCS | Performed by: INTERNAL MEDICINE

## 2022-04-18 PROCEDURE — 4040F PNEUMOC VAC/ADMIN/RCVD: CPT | Performed by: INTERNAL MEDICINE

## 2022-04-18 ASSESSMENT — ENCOUNTER SYMPTOMS
COUGH: 0
SORE THROAT: 0
WHEEZING: 0
ABDOMINAL PAIN: 0
CHEST TIGHTNESS: 0
CONSTIPATION: 0

## 2022-04-18 ASSESSMENT — PATIENT HEALTH QUESTIONNAIRE - PHQ9
2. FEELING DOWN, DEPRESSED OR HOPELESS: 0
1. LITTLE INTEREST OR PLEASURE IN DOING THINGS: 0
SUM OF ALL RESPONSES TO PHQ9 QUESTIONS 1 & 2: 0
SUM OF ALL RESPONSES TO PHQ QUESTIONS 1-9: 0

## 2022-04-18 NOTE — PROGRESS NOTES
Chief Complaint   Patient presents with    6 Month Follow-Up     Needs a new CPAP machine, uses [employer] current machine not functioning appropriate     History of presenting illness:  Yenny Sultana is a64 y.o. male who presents today for follow up on his chronic medical conditions as noted below.       Patient Active Problem List    Diagnosis Date Noted    Aortic root enlargement ([address] 10/14/2020    Statin intolerance 04/09/2019    Weakness 10/19/2018    Gait abnormality 10/19/2018    History of stroke 10/19/2018    Low back pain 10/19/2018    Hemorrhoids 09/24/2018    Elevated PSA 09/18/2018    PAF (paroxysmal atrial fibrillation) ([address] 09/18/2018    Lumbar compression fracture, sequela 09/18/2018    Left hemiparesis ([address] 04/11/2018    Essential hypertension 03/13/2018    Pure hypercholesterolemia 03/13/2018    CAROLEE on CPAP 03/13/2018    Cerebrovascular disease 03/13/2018    Cerebral arteriosclerosis with history of previous stroke 03/13/2018    IFG (impaired fasting glucose) 03/13/2018    Exogenous obesity 03/13/2018    Atypical chest pain 12/28/2016     Past Medical History:   Diagnosis Date    Arthritis     GERD (gastroesophageal reflux disease)     Hypertension     Lumbar compression fracture, sequela     1980    CAROLEE on CPAP     PAF (paroxysmal atrial fibrillation) ([address]     Stroke ([address] [id number]    minis stroke about 5 years ago as well, both on the left side    Stroke ([address]     10 years ago, weakness on left side      Past Surgical History:   Procedure Laterality Date    CHOLECYSTECTOMY      [facility]  2008    [facility]    COLONOSCOPY N/A 6/25/2018    Dr Leopold Kayser AP (-) dysplasia x 4--6 month recall    COLONOSCOPY N/A 12/12/2018    Dr Leopold Kayser AP (-) dysplasia x 1, HP x 2--3 yr recall    HERNIA REPAIR      TOTAL KNEE ARTHROPLASTY Right     TOTAL KNEE ARTHROPLASTY Left     UPPER GASTROINTESTINAL ENDOSCOPY  2008    [facility]     Current Outpatient Medications   Medication Sig Dispense Refill    amLODIPine-benazepril (LOTREL) 10-20 MG per capsule TAKE ONE CAPSULE BY MOUTH DAILY 90 capsule 1    rivaroxaban (XARELTO) 20 MG TABS tablet Take 20 mg by mouth daily        No current facility-administered medications for this visit. Allergies   Allergen Reactions    Statins Other (See Comments)     weak     Social History     Tobacco Use    Smoking status: Former Smoker     Packs/day: 0.50     Years: 20.00     Pack years: 10.00     Types: Cigars     Quit date: 2019     Years since quittin.6    Smokeless tobacco: Never Used    Tobacco comment: Pt only smoked Cigars   Substance Use Topics    Alcohol use: No      Family History   Problem Relation Age of Onset    Colon Cancer Neg Hx     Colon Polyps Neg Hx     Liver Cancer Neg Hx     Liver Disease Neg Hx     Esophageal Cancer Neg Hx     Stomach Cancer Neg Hx     Rectal Cancer Neg Hx        Review of Systems   Constitutional: Negative for chills, fatigue and fever. HENT: Negative for congestion, ear pain, nosebleeds, postnasal drip and sore throat. Respiratory: Negative for cough, chest tightness and wheezing. Cardiovascular: Negative for chest pain, palpitations and leg swelling. Gastrointestinal: Negative for abdominal pain and constipation. Genitourinary: Negative for dysuria and urgency. Musculoskeletal: Positive for arthralgias. Skin: Negative for rash. Neurological: Negative for dizziness and headaches. Psychiatric/Behavioral: Negative. Vitals:    22 0951   BP: 110/70   Site: Left Upper Arm   Position: Sitting   Cuff Size: Large Adult   Pulse: 75   Resp: 18   SpO2: 97%   Weight: 232 lb (105.2 kg)   Height: 5' 11\" (1.803 m)     Body mass index is 32.36 kg/m². Physical Exam  Constitutional:       Appearance: He is well-developed.    HENT:      Right Ear: External ear normal.      Left Ear: External ear normal.      Mouth/Throat:      Pharynx: No oropharyngeal exudate. Eyes:      Conjunctiva/sclera: Conjunctivae normal.      Pupils: Pupils are equal, round, and reactive to light. Neck:      Thyroid: No thyromegaly. Vascular: No JVD. Cardiovascular:      Rate and Rhythm: Normal rate. Heart sounds: Normal heart sounds. No murmur heard. Pulmonary:      Effort: No respiratory distress. Breath sounds: Normal breath sounds. No wheezing or rales. Chest:      Chest wall: No tenderness. Abdominal:      General: Bowel sounds are normal.      Palpations: Abdomen is soft. Musculoskeletal:      Cervical back: Neck supple. Lymphadenopathy:      Cervical: No cervical adenopathy. Skin:     Findings: No rash. Lab Review   Orders Only on 04/14/2022   Component Date Value    Sodium 04/14/2022 144     Potassium 04/14/2022 4.5     Chloride 04/14/2022 102     CO2 04/14/2022 26     Anion Gap 04/14/2022 16     Glucose 04/14/2022 101     BUN 04/14/2022 22     CREATININE 04/14/2022 1.1     GFR Non- 04/14/2022 >60     GFR  04/14/2022 >59     Calcium 04/14/2022 9.9     Total Protein 04/14/2022 6.6     Albumin 04/14/2022 4.6     Total Bilirubin 04/14/2022 0.4     Alkaline Phosphatase 04/14/2022 89     ALT 04/14/2022 11     AST 04/14/2022 10     Cholesterol, Total 04/14/2022 180     Triglycerides 04/14/2022 145     HDL 04/14/2022 42*    LDL Calculated 04/14/2022 109     Hemoglobin A1C 04/14/2022 5.9     PSA 04/14/2022 8.57*           ASSESSMENT/PLAN:      Pure hypercholesterolemia  Statin intolerance hx  Lab results reviewed and DW pt in detail  LDL 109  TG nl range  Had previous issues with statin related myopathy ( tried crestor and liptor)  Susana Munoz his prior history of stroke.  This time patient wishes not to try any other lipid-lowering medications  Healthy, mostly fiber rich nonstarchy plant-based diet recommended  Recommend to decrease intake of processed foods, simple carbohydrates and animal-based products that high in saturated fats        IFG (impaired fasting glucose)   Lab results dw pt in detail  a1c higher at 5.9 in 4/2022 ( 5.8 in 4/2021 ( 5.9 )  diet and weight loss recommended     Exogenous obesity  Pt was given counseling about diet and exercise including education on what calories are, where calories come from, the need for portion control,following lower carbohydrate dietary regimen and healthy snacks along side an active lifestyle with supplementary exercise of approx 30 minutes a week, 5 days a week of exercise for weight loss.  The patient voiced increased understanding of the topics discussed.       He has history of PAF  Follows with Dr. Jessica Cifuentes PAF and history of cryptogenic stroke  Has recently seen Dr. Edward Rodriguez  He is wearing loop recorder since about a year ago  His echocardiogram that was done just in March 2020  Result Narrative   · No evidence of pulmonary hypertension is present. · Estimated EF = 65%. · Left ventricular systolic function is normal.  · Left ventricular diastolic dysfunction. · Mild dilation of the aortic root is present.         Aortic root dilatation  CT 10/2020  View ordered is unremarkable at approximately 42 mm in maximal  dimension in the ascending aorta  As per dr Antwan Vickers hypertension  Blood pressure readings reviewed  he will continue amlodipine 10, benazepril 20         CAROLEE on CPAP  Not doing well since equipment is not working  Frequently at night his CPAP just stops working  Pt needs new equipement  Pt does benefit from CPAP  Orders sent to [employer]  Doing well  Cont current settings     Post CVA  Residual mild LEFT LE weakness  Stable  Again, unable to take statin/statin intolerance         Lung nodules:  CT 10/2020  Lungs: Centrilobular emphysematous changes present. Small pulmonary  nodules are present. 4 mm nodules present on image 81 in the periphery  of the right lung.  6 mm nodule is present on image 89 in the right lower  lobe  7 mm nodule is present in the right lung on image 95  Calcified granulomas are present in the right and left lung. 5 mm  nodules present left upper lobe on image 57 mm nodule is present on  image 86  5 mm nodule noted in the left lower lobe on image 110. Mild pleural  thickening is noted in the left chest posteriorly. . The trachea and  bronchial tree are patent.   Repeat CT 10/2021:  CT results  Multiple lung nodules present  Some have decreased in size and number of nodules have increased in size.  No new nodules  Seen  pulmonologist at [facility] records  Has upcoming fu with pulmonology     Thyroid nodule per CT:  large heterogeneous partially calcified mass in the right  lobe of the thyroid gland measuring 0.2 x 2.9 cm  Ultrasound of the thyroid      Impression   1. Dominant right thyroid nodule, which meets criteria for   ultrasound-guided fine-needle aspiration if not previously performed. Signed by Dr Delia Gage       Seen dr López Davies  (following highlighted text has been copied from this specialist note)  Patient has low risk thyroid nodule. I am concerned about the size and possible compression. He has no compressive symptoms. After discussion of risk, benefits, alternative treatments, options, the patient wishes continued observation. He is currently on blood thinners. He is at low risk for malignancy.   Thyroid ultrasound-1 year, ordered  Call for any growth in the neck       New low-density nodule in the spleen, new finding on CT, incidental  Ultrasound of spleen is recommended to be repeated 5/2022  Orders placed     Mild ectasia ( widening )of the  ascending thoracic aorta which measures 4 cm -repeat study in 1 year is recommended-as per cardiology        Elevated PSA  Urology referral recommended 10/2020  Pt never made appt/ refused to see urology  PSA results  4/2022  ,much higher at 8.5  10/2021 4.49  April 2021 5.46  October 2021 5.93  Patient refused last few visits  to see urology for this problem  Now agreeable        Orders Placed This Encounter   Procedures    Hemoglobin A1C    Comprehensive Metabolic Panel    CBC with Auto Differential    Lipid Panel    Urinalysis    TSH    Vitamin D 25 Hydroxy   Troy Matson MD, Urology, [place]     New Prescriptions    No medications on file         No follow-ups on file. There are no Patient Instructions on file for this visit. EMR Dragon/transcription disclaimer:Significant part of this  encounter note is electronic transcription/translationof spoken language to printed text. The electronic translation of spoken language may be erroneous, or at times, nonsensical words or phrases may be inadvertently transcribed.  Although I have reviewed the note for sucherrors, some may still exist.

## 2022-05-04 ENCOUNTER — HOSPITAL ENCOUNTER (OUTPATIENT)
Dept: GENERAL RADIOLOGY | Age: 77
Discharge: HOME | End: 2022-05-04
Payer: MEDICARE

## 2022-05-04 DIAGNOSIS — D18.03 HEMANGIOMA OF SPLEEN: ICD-10-CM

## 2022-05-04 PROCEDURE — 76700 US EXAM ABDOM COMPLETE: CPT

## 2022-05-16 ENCOUNTER — OFFICE VISIT (OUTPATIENT)
Dept: UROLOGY | Age: 77
End: 2022-05-16
Payer: MEDICARE

## 2022-05-16 VITALS
HEIGHT: 71 IN | BODY MASS INDEX: 32.2 KG/M2 | TEMPERATURE: 97.3 F | WEIGHT: 230 LBS | DIASTOLIC BLOOD PRESSURE: 78 MMHG | SYSTOLIC BLOOD PRESSURE: 130 MMHG

## 2022-05-16 DIAGNOSIS — R35.0 BENIGN PROSTATIC HYPERPLASIA WITH URINARY FREQUENCY: ICD-10-CM

## 2022-05-16 DIAGNOSIS — N40.1 BENIGN PROSTATIC HYPERPLASIA WITH URINARY FREQUENCY: ICD-10-CM

## 2022-05-16 DIAGNOSIS — R97.20 ELEVATED PSA: Primary | ICD-10-CM

## 2022-05-16 LAB
APPEARANCE FLUID: CLEAR
BILIRUBIN, POC: NORMAL
BLOOD URINE, POC: NORMAL
CLARITY, POC: CLEAR
COLOR, POC: YELLOW
GLUCOSE URINE, POC: NORMAL
KETONES, POC: NORMAL
LEUKOCYTE EST, POC: NORMAL
NITRITE, POC: NORMAL
PH, POC: 6.5
PROTEIN, POC: NORMAL
SPECIFIC GRAVITY, POC: 1.02
UROBILINOGEN, POC: 1

## 2022-05-16 PROCEDURE — G8427 DOCREV CUR MEDS BY ELIG CLIN: HCPCS | Performed by: UROLOGY

## 2022-05-16 PROCEDURE — 81002 URINALYSIS NONAUTO W/O SCOPE: CPT | Performed by: UROLOGY

## 2022-05-16 PROCEDURE — 51798 US URINE CAPACITY MEASURE: CPT | Performed by: UROLOGY

## 2022-05-16 PROCEDURE — 4040F PNEUMOC VAC/ADMIN/RCVD: CPT | Performed by: UROLOGY

## 2022-05-16 PROCEDURE — 1123F ACP DISCUSS/DSCN MKR DOCD: CPT | Performed by: UROLOGY

## 2022-05-16 PROCEDURE — 99204 OFFICE O/P NEW MOD 45 MIN: CPT | Performed by: UROLOGY

## 2022-05-16 PROCEDURE — G8417 CALC BMI ABV UP PARAM F/U: HCPCS | Performed by: UROLOGY

## 2022-05-16 PROCEDURE — 1036F TOBACCO NON-USER: CPT | Performed by: UROLOGY

## 2022-05-16 RX ORDER — ALFUZOSIN HYDROCHLORIDE 10 MG/1
10 TABLET, EXTENDED RELEASE ORAL DAILY
Qty: 30 TABLET | Refills: 11 | Status: SHIPPED | OUTPATIENT
Start: 2022-05-16 | End: 2022-10-18 | Stop reason: CLARIF

## 2022-05-16 ASSESSMENT — ENCOUNTER SYMPTOMS
COUGH: 0
BACK PAIN: 0
TROUBLE SWALLOWING: 0
ABDOMINAL DISTENTION: 0
VOMITING: 0
NAUSEA: 0
DIARRHEA: 0
SHORTNESS OF BREATH: 0
CONSTIPATION: 0
ABDOMINAL PAIN: 0
EYE DISCHARGE: 0
EYE REDNESS: 0

## 2022-05-16 NOTE — PROGRESS NOTES
Diana John is a 64 y.o. male who presents today   Chief Complaint   Patient presents with    New Patient     I am here today for my elevated PSA      Elevated PSA:  Patient is here today for history of an elevated PSA. His last several PSA values are as follows:  Lab Results   Component Value Date    PSA 8.57 (H) 04/14/2022    PSA 4.49 (H) 10/11/2021    PSA 5.46 (H) 04/12/2021    PSA 5.93 (H) 10/09/2020    PSA 4.86 (H) 10/10/2019     Overall the PSA level is: increased  Recent history of urinary tract infection/prostatitis? no  Previous prostate biopsy? no  Lower urinary tract symptoms: urgency, frequency, decreased urinary stream and nocturia, 2 times per night    BPH / LUTS:  Patient is here today for lower urinary tract symptoms which started a few year(s) ago. Recently the urinary symptoms are: are worsening  Current medical Rx for BPH/LUTS: none  Previous treatments tried for LUTS: He taking tamsulosin few years ago but usually did not think it made any difference or improvement  Previous surgical intervention: none  Urinary retention: No  Any associated gross hematuria? no  History of recurrent UTIs: no  His AUA Symptom Score is, 15/35   Patient states symptoms are of moderate severity. He mostly complains of frequency urgency and feeling of incomplete emptying he does have decreased force of stream nocturia x2 his quality life score is 4 mostly dissatisfied.     Past Medical History:   Diagnosis Date    Arthritis     GERD (gastroesophageal reflux disease)     Hypertension     Lumbar compression fracture, sequela     1980    CAROLEE on CPAP     PAF (paroxysmal atrial fibrillation) ([address]     Stroke ([address] [id number]    minis stroke about 5 years ago as well, both on the left side    Stroke ([address]     10 years ago, weakness on left side       Past Surgical History:   Procedure Laterality Date    CHOLECYSTECTOMY      [facility]COPY  2008    [facility]    COLONOSCOPY N/A 6/25/2018    Dr Jose David Ortiz AP (-) dysplasia x 4--6 month recall    COLONOSCOPY N/A 2018    Dr Sebas Hager AP (-) dysplasia x 1, HP x 2--3 yr recall    HERNIA REPAIR      TOTAL KNEE ARTHROPLASTY Right     TOTAL KNEE ARTHROPLASTY Left     UPPER [facility]TEST[facility] ENDOSCOPY      [facility]       Current Outpatient Medications   Medication Sig Dispense Refill    amLODIPine-benazepril (LOTREL) 10-20 MG per capsule TAKE ONE CAPSULE BY MOUTH DAILY 90 capsule 1    rivaroxaban (XARELTO) 20 MG TABS tablet Take 20 mg by mouth daily        No current facility-administered medications for this visit. Allergies   Allergen Reactions    Sulfa Antibiotics Myalgia    Statins Other (See Comments)     weak       Social History     Socioeconomic History    Marital status:      Spouse name: None    Number of children: None    Years of education: None    Highest education level: None   Occupational History    None   Tobacco Use    Smoking status: Former Smoker     Packs/day: 0.50     Years: 20.00     Pack years: 10.00     Types: Cigars     Quit date: 2019     Years since quittin.7    Smokeless tobacco: Never Used    Tobacco comment: Pt only smoked Cigars   Vaping Use    Vaping Use: Never used   Substance and Sexual Activity    Alcohol use: No    Drug use: No    Sexual activity: None   Other Topics Concern    None   Social History Narrative    None     Social Determinants of Health     Financial Resource Strain: Low Risk     Difficulty of Paying Living Expenses: Not hard at all   Food Insecurity: No Food Insecurity    Worried About Running Out of Food in the Last Year: Never true    [place] of Food in the Last Year: Never true   Transportation Needs:     Lack of Transportation (Medical): Not on file    Lack of Transportation (Non-Medical):  Not on file   Physical Activity:     Days of Exercise per Week: Not on file    Minutes of Exercise per Session: Not on file   Stress:     Feeling of Stress : Not on file Social Connections:     Frequency of Communication with Friends and Family: Not on file    Frequency of Social Gatherings with Friends and Family: Not on file    Attends Rastafarian Services: Not on file    Active Member of Clubs or Organizations: Not on file    Attends Club or Organization Meetings: Not on file    Marital Status: Not on file   Intimate Partner Violence:     Fear of Current or Ex-Partner: Not on file    Emotionally Abused: Not on file    Physically Abused: Not on file    Sexually Abused: Not on file   Housing Stability:     Unable to Pay for Housing in the Last Year: Not on file    Number of [place] in the Last Year: Not on file    Unstable Housing in the Last Year: Not on file       Family History   Problem Relation Age of Onset    Colon Cancer Neg Hx     Colon Polyps Neg Hx     Liver Cancer Neg Hx     Liver Disease Neg Hx     Esophageal Cancer Neg Hx     Stomach Cancer Neg Hx     Rectal Cancer Neg Hx        REVIEW OF SYSTEMS:  Review of Systems   Unable to perform ROS: Other   Constitutional: Negative for chills, fatigue and fever. HENT: Negative for congestion, ear pain and trouble swallowing. Eyes: Negative for discharge and redness. Respiratory: Negative for cough and shortness of breath. Cardiovascular: Negative for chest pain. Gastrointestinal: Negative for abdominal distention, abdominal pain, constipation, diarrhea, nausea and vomiting. Endocrine: Negative for cold intolerance and heat intolerance. Genitourinary: Positive for frequency. Negative for difficulty urinating, dysuria, flank pain, hematuria and urgency. Musculoskeletal: Negative for back pain and gait problem. Skin: Negative for pallor and wound. Allergic/Immunologic: Negative for environmental allergies and immunocompromised state. Neurological: Negative for dizziness and weakness. Hematological: Negative for adenopathy. Does not bruise/bleed easily.    Psychiatric/Behavioral: Negative for agitation and confusion. PHYSICAL EXAM:  /78   Temp 97.3 °F (36.3 °C) (Temporal)   Ht 5' 11\" (1.803 m)   Wt 230 lb (104.3 kg)   BMI 32.08 kg/m²   Physical Exam  Constitutional:       General: He is not in acute distress. Appearance: Normal appearance. He is well-developed. HENT:      Head: Normocephalic and atraumatic. Nose: Nose normal.   Eyes:      General: No scleral icterus. Conjunctiva/sclera: Conjunctivae normal.      Pupils: Pupils are equal, round, and reactive to light. Neck:      Trachea: No tracheal deviation. Cardiovascular:      Rate and Rhythm: Normal rate and regular rhythm. Pulmonary:      Effort: Pulmonary effort is normal. No respiratory distress. Breath sounds: No stridor. Abdominal:      General: There is no distension. Palpations: Abdomen is soft. There is no mass. Tenderness: There is no abdominal tenderness. Genitourinary:     Penis: Normal.       Testes: Normal.      Prostate: Enlarged (40 g). No nodules present. Musculoskeletal:         General: No tenderness. Normal range of motion. Cervical back: Normal range of motion and neck supple. Lymphadenopathy:      Cervical: No cervical adenopathy. Skin:     General: Skin is warm and dry. Findings: No erythema. Neurological:      Mental Status: He is alert and oriented to person, place, and time.    Psychiatric:         Behavior: Behavior normal.         Judgment: Judgment normal.             DATA:  CMP:    Lab Results   Component Value Date     04/14/2022    K 4.5 04/14/2022     04/14/2022    CO2 26 04/14/2022    BUN 22 04/14/2022    CREATININE 1.1 04/14/2022    GFRAA >59 04/14/2022    LABGLOM >60 04/14/2022    GLUCOSE 101 04/14/2022    PROT 6.6 04/14/2022    LABALBU 4.6 04/14/2022    CALCIUM 9.9 04/14/2022    BILITOT 0.4 04/14/2022    ALKPHOS 89 04/14/2022    AST 10 04/14/2022    ALT 11 04/14/2022     Results for orders placed or performed in visit on 05/16/22   POCT Urinalysis no Micro   Result Value Ref Range    Color, UA YELLOW     Clarity, UA CLEAR     Glucose, UA POC NEG     Bilirubin, UA NEG     Ketones, UA NEG     Spec Grav, UA 1.020     Blood, UA POC TRACE     pH, UA 6.5     Protein, UA POC NEG     Urobilinogen, UA 1     Leukocytes, UA NEG     Nitrite, UA NEG     Appearance, Fluid Clear Clear, Slightly Cloudy     Lab Results   Component Value Date    PSA 8.57 (H) 04/14/2022    PSA 4.49 (H) 10/11/2021    PSA 5.46 (H) 04/12/2021    PSA 5.93 (H) 10/09/2020    PSA 4.86 (H) 10/10/2019         1. Elevated PSA  PSA is up we will repeat the PSA in 6 weeks if this remains elevated not back down to baseline which is about 4.5-5.5 he would need a biopsy. ÁLVARO was nonsuspicious today  - POCT Urinalysis no Micro  - PSA, Total and Free; Future  - OR Measure, post-void residual, US, non-imaging    2. Benign prostatic hyperplasia with urinary frequency  He has had progression of his symptoms over the last few years particularly over the last maybe 6 months mostly complains of frequency urgency residual today was 11. He did not respond to tamsulosin previously we will try him on a different alpha-blocker depending on the results of his PSA make further recommendations  - alfuzosin (UROXATRAL) 10 MG extended release tablet; Take 1 tablet by mouth daily  Dispense: 30 tablet; Refill: 11  - OR Measure, post-void residual, US, non-imaging      Orders Placed This Encounter   Procedures    PSA, Total and Free     Prior to next visit     Standing Status:   Future     Standing Expiration Date:   5/16/2023    POCT Urinalysis no Micro    OR Measure, post-void residual, US, non-imaging        Return in about 6 weeks (around 6/27/2022) for PSA prior to vext visit.     All information inputted into the note by the MA to include chief complaint, past medical history, past surgical history, medications, allergies, social and family history and review of systems has been reviewed and updated as needed by me. EMR Dragon/transcription disclaimer: Much of this documentt is electronic  transcription/translation of spoken language to printed text. The  electronic translation of spoken language may be erroneous, or at times,  nonsensical words or phrases may be inadvertently transcribed.  Although I  have reviewed the document for such errors, some may still exist.

## 2022-06-28 DIAGNOSIS — R97.20 ELEVATED PSA: ICD-10-CM

## 2022-06-30 LAB
PROSTATE SPECIFIC ANTIGEN FREE: 1.1 NG/ML
PROSTATE SPECIFIC ANTIGEN PERCENT FREE: 20 %
PROSTATE SPECIFIC ANTIGEN: 5.5 NG/ML (ref 0–4)

## 2022-07-18 ENCOUNTER — OFFICE VISIT (OUTPATIENT)
Dept: INTERNAL MEDICINE | Age: 77
End: 2022-07-18
Payer: MEDICARE

## 2022-07-18 VITALS
DIASTOLIC BLOOD PRESSURE: 68 MMHG | RESPIRATION RATE: 18 BRPM | SYSTOLIC BLOOD PRESSURE: 110 MMHG | BODY MASS INDEX: 30.61 KG/M2 | WEIGHT: 226 LBS | HEIGHT: 72 IN

## 2022-07-18 DIAGNOSIS — G47.33 OSA ON CPAP: Primary | ICD-10-CM

## 2022-07-18 DIAGNOSIS — I10 ESSENTIAL HYPERTENSION: ICD-10-CM

## 2022-07-18 DIAGNOSIS — Z99.89 OSA ON CPAP: Primary | ICD-10-CM

## 2022-07-18 PROCEDURE — G8427 DOCREV CUR MEDS BY ELIG CLIN: HCPCS | Performed by: INTERNAL MEDICINE

## 2022-07-18 PROCEDURE — 1036F TOBACCO NON-USER: CPT | Performed by: INTERNAL MEDICINE

## 2022-07-18 PROCEDURE — 99213 OFFICE O/P EST LOW 20 MIN: CPT | Performed by: INTERNAL MEDICINE

## 2022-07-18 PROCEDURE — G8417 CALC BMI ABV UP PARAM F/U: HCPCS | Performed by: INTERNAL MEDICINE

## 2022-07-18 PROCEDURE — 1123F ACP DISCUSS/DSCN MKR DOCD: CPT | Performed by: INTERNAL MEDICINE

## 2022-07-18 RX ORDER — AMLODIPINE BESYLATE AND BENAZEPRIL HYDROCHLORIDE 10; 20 MG/1; MG/1
CAPSULE ORAL
Qty: 90 CAPSULE | Refills: 0 | Status: SHIPPED | OUTPATIENT
Start: 2022-07-18

## 2022-07-18 ASSESSMENT — PATIENT HEALTH QUESTIONNAIRE - PHQ9
SUM OF ALL RESPONSES TO PHQ QUESTIONS 1-9: 0
2. FEELING DOWN, DEPRESSED OR HOPELESS: 0
SUM OF ALL RESPONSES TO PHQ9 QUESTIONS 1 & 2: 0
SUM OF ALL RESPONSES TO PHQ QUESTIONS 1-9: 0
1. LITTLE INTEREST OR PLEASURE IN DOING THINGS: 0

## 2022-07-18 ASSESSMENT — ENCOUNTER SYMPTOMS
COUGH: 0
CONSTIPATION: 0
SORE THROAT: 0
WHEEZING: 0
CHEST TIGHTNESS: 0
ABDOMINAL PAIN: 0

## 2022-07-18 NOTE — PROGRESS NOTES
Chief Complaint   Patient presents with    Follow-up     CPAP machine follow up 1 month after placement of the new machine     History of presenting illness:  Ashley Peoples is a64 y.o. male who presents today for follow up on his chronic medical conditions as noted below.     Patient Active Problem List    Diagnosis Date Noted    Benign prostatic hyperplasia with urinary frequency 05/16/2022     Priority: Medium    Aortic root enlargement ([address] 10/14/2020    Statin intolerance 04/09/2019    Weakness 10/19/2018    Gait abnormality 10/19/2018    History of stroke 10/19/2018    Low back pain 10/19/2018    Hemorrhoids 09/24/2018    Elevated PSA 09/18/2018    PAF (paroxysmal atrial fibrillation) ([address] 09/18/2018    Lumbar compression fracture, sequela 09/18/2018    Left hemiparesis ([address] 04/11/2018    Essential hypertension 03/13/2018    Pure hypercholesterolemia 03/13/2018    CAROLEE on CPAP 03/13/2018    Cerebrovascular disease 03/13/2018    Cerebral arteriosclerosis with history of previous stroke 03/13/2018    IFG (impaired fasting glucose) 03/13/2018    Exogenous obesity 03/13/2018    Atypical chest pain 12/28/2016     Past Medical History:   Diagnosis Date    Arthritis     GERD (gastroesophageal reflux disease)     Hypertension     Lumbar compression fracture, sequela     1980    CAROLEE on CPAP     PAF (paroxysmal atrial fibrillation) ([address]     Stroke ([address] [id number]    minis stroke about 5 years ago as well, both on the left side    Stroke ([address]     10 years ago, weakness on left side      Past Surgical History:   Procedure Laterality Date    CHOLECYSTECTOMY      COLONOSCOPY  2008    [facility]    COLONOSCOPY N/A 6/25/2018    Dr Johny Koch AP (-) dysplasia x 4--6 month recall    COLONOSCOPY N/A 12/12/2018    Dr Johny Koch AP (-) dysplasia x 1, HP x 2--3 yr recall    HERNIA REPAIR      TOTAL KNEE ARTHROPLASTY Right     TOTAL KNEE ARTHROPLASTY Left     UPPER GASTROINTESTINAL ENDOSCOPY  2008    [facility] Current Outpatient Medications   Medication Sig Dispense Refill    alfuzosin (UROXATRAL) 10 MG extended release tablet Take 1 tablet by mouth daily 30 tablet 11    amLODIPine-benazepril (LOTREL) 10-20 MG per capsule TAKE ONE CAPSULE BY MOUTH DAILY 90 capsule 1    rivaroxaban (XARELTO) 20 MG TABS tablet Take 20 mg by mouth daily        No current facility-administered medications for this visit. Allergies   Allergen Reactions    Sulfa Antibiotics Myalgia    Statins Other (See Comments)     weak     Social History     Tobacco Use    Smoking status: Former     Packs/day: 0.50     Years: 20.00     Pack years: 10.00     Types: Cigars, Cigarettes     Quit date: 2019     Years since quittin.9    Smokeless tobacco: Never    Tobacco comments:     Pt only smoked Cigars   Substance Use Topics    Alcohol use: No      Family History   Problem Relation Age of Onset    Colon Cancer Neg Hx     Colon Polyps Neg Hx     Liver Cancer Neg Hx     Liver Disease Neg Hx     Esophageal Cancer Neg Hx     Stomach Cancer Neg Hx     Rectal Cancer Neg Hx        Review of Systems   Constitutional:  Negative for chills, fatigue and fever. HENT:  Negative for congestion, ear pain, nosebleeds, postnasal drip and sore throat. Respiratory:  Negative for cough, chest tightness and wheezing. Cardiovascular:  Negative for chest pain, palpitations and leg swelling. Gastrointestinal:  Negative for abdominal pain and constipation. Genitourinary:  Negative for dysuria and urgency. Musculoskeletal:  Positive for arthralgias. Skin:  Negative for rash. Neurological:  Negative for dizziness and headaches. Psychiatric/Behavioral: Negative. Vitals:    22 1005   BP: 110/68   Site: Left Upper Arm   Position: Sitting   Cuff Size: Large Adult   Resp: 18   Weight: 226 lb (102.5 kg)   Height: 5' 11.5\" (1.816 m)     Body mass index is 31.08 kg/m². Physical Exam  Constitutional:       Appearance: He is well-developed. HENT:      Right Ear: External ear normal.      Left Ear: External ear normal.      Mouth/Throat:      Pharynx: No oropharyngeal exudate. Eyes:      Conjunctiva/sclera: Conjunctivae normal.      Pupils: Pupils are equal, round, and reactive to light. Neck:      Thyroid: No thyromegaly. Vascular: No JVD. Cardiovascular:      Rate and Rhythm: Normal rate. Heart sounds: Normal heart sounds. No murmur heard. Pulmonary:      Effort: No respiratory distress. Breath sounds: Normal breath sounds. No wheezing or rales. Chest:      Chest wall: No tenderness. Abdominal:      General: Bowel sounds are normal.      Palpations: Abdomen is soft. Musculoskeletal:      Cervical back: Neck supple. Lymphadenopathy:      Cervical: No cervical adenopathy. Skin:     Findings: No rash. Lab Review   Orders Only on 06/28/2022   Component Date Value    PSA 06/28/2022 5.5 (A)    PSA, Free 06/28/2022 1.1     PSA, Free Pct 06/28/2022 20            ASSESSMENT/PLAN:    CAROLEE on CPAP    Essential hypertension    New CPAP last 3 month- no issues  Doing well  Sleeping well no daytime drowsiness  Benefits from CPAP    Compliance report reviewed- 98.8%    Essential hypertension  Blood pressure readings reviewed  he will continue amlodipine 10, benazepril 20    No orders of the defined types were placed in this encounter. New Prescriptions    No medications on file         No follow-ups on file. There are no Patient Instructions on file for this visit. EMR Dragon/transcription disclaimer:Significant part of this  encounter note is electronic transcription/translationof spoken language to printed text. The electronic translation of spoken language may be erroneous, or at times, nonsensical words or phrases may be inadvertently transcribed.  Although I have reviewed the note for sucherrors, some may still exist.

## 2022-07-18 NOTE — TELEPHONE ENCOUNTER
Last Appointment Date: 4/18/2022  Next Appointment Date: 7/18/2022    Allergies   Allergen Reactions    Sulfa Antibiotics Myalgia    Statins Other (See Comments)     weak       Patient needs refill on   Requested Prescriptions     Pending Prescriptions Disp Refills    amLODIPine-benazepril (LOTREL) 10-20 MG per capsule [Pharmacy Med Name: AMLODIPINE-BENAZEPRIL 10-20 10-20 Capsule] 90 capsule 1     Sig: TAKE ONE CAPSULE BY MOUTH DAILY

## 2022-08-11 ENCOUNTER — OFFICE VISIT (OUTPATIENT)
Dept: UROLOGY | Age: 77
End: 2022-08-11
Payer: MEDICARE

## 2022-08-11 VITALS
TEMPERATURE: 97.9 F | DIASTOLIC BLOOD PRESSURE: 80 MMHG | BODY MASS INDEX: 32.06 KG/M2 | SYSTOLIC BLOOD PRESSURE: 138 MMHG | WEIGHT: 229 LBS | HEIGHT: 71 IN | HEART RATE: 79 BPM | OXYGEN SATURATION: 97 %

## 2022-08-11 DIAGNOSIS — R35.0 BENIGN PROSTATIC HYPERPLASIA WITH URINARY FREQUENCY: Primary | ICD-10-CM

## 2022-08-11 DIAGNOSIS — R97.20 ELEVATED PSA: ICD-10-CM

## 2022-08-11 DIAGNOSIS — N40.1 BENIGN PROSTATIC HYPERPLASIA WITH URINARY FREQUENCY: Primary | ICD-10-CM

## 2022-08-11 LAB
APPEARANCE FLUID: CLEAR
BILIRUBIN, POC: NORMAL
BLOOD URINE, POC: NORMAL
CLARITY, POC: CLEAR
COLOR, POC: YELLOW
GLUCOSE URINE, POC: NORMAL
KETONES, POC: NORMAL
LEUKOCYTE EST, POC: NORMAL
NITRITE, POC: NORMAL
PH, POC: 5.5
PROTEIN, POC: NORMAL
SPECIFIC GRAVITY, POC: 1.01
UROBILINOGEN, POC: 0.2

## 2022-08-11 PROCEDURE — G8427 DOCREV CUR MEDS BY ELIG CLIN: HCPCS | Performed by: UROLOGY

## 2022-08-11 PROCEDURE — 1123F ACP DISCUSS/DSCN MKR DOCD: CPT | Performed by: UROLOGY

## 2022-08-11 PROCEDURE — 99214 OFFICE O/P EST MOD 30 MIN: CPT | Performed by: UROLOGY

## 2022-08-11 PROCEDURE — 1036F TOBACCO NON-USER: CPT | Performed by: UROLOGY

## 2022-08-11 PROCEDURE — 81002 URINALYSIS NONAUTO W/O SCOPE: CPT | Performed by: UROLOGY

## 2022-08-11 PROCEDURE — 51798 US URINE CAPACITY MEASURE: CPT | Performed by: UROLOGY

## 2022-08-11 PROCEDURE — G8417 CALC BMI ABV UP PARAM F/U: HCPCS | Performed by: UROLOGY

## 2022-08-11 ASSESSMENT — ENCOUNTER SYMPTOMS
ABDOMINAL DISTENTION: 0
EYE REDNESS: 0
VOMITING: 0
TROUBLE SWALLOWING: 0
BACK PAIN: 0
NAUSEA: 0
CONSTIPATION: 0
ABDOMINAL PAIN: 0
DIARRHEA: 0
EYE DISCHARGE: 0
COUGH: 0
SHORTNESS OF BREATH: 0

## 2022-08-11 NOTE — H&P (VIEW-ONLY)
Tank Mantilla is a 64 y.o. male who presents today   Chief Complaint   Patient presents with    Follow-up     I am here today for my 3 month follow up, my PSA is done      Elevated PSA:  Patient is here today for history of an elevated PSA. His last several PSA values are as follows:  Lab Results   Component Value Date    PSA 5.5 (H) 06/28/2022    PSA 8.57 (H) 04/14/2022    PSA 4.49 (H) 10/11/2021    PSA 5.46 (H) 04/12/2021    PSA 5.93 (H) 10/09/2020     Overall the PSA level is: decreased, back down to his baseline below his age-adjusted cut of 6.5  Recent history of urinary tract infection/prostatitis? no  Previous prostate biopsy? no  Lower urinary tract symptoms: urgency, frequency, decreased urinary stream, and nocturia, 2 times per night, some incomplete emptying and intermittency mild straining     BPH / LUTS:  Patient is here today for lower urinary tract symptoms which started  year(s) ago. Recently the urinary symptoms are: show no change despite him starting Uroxatrol since last visit in May 2022  Current medical Rx for BPH/LUTS: Alfuzosin. He does not feel like this is really any benefit  Previous treatments tried for LUTS: Alpha-blocker with tamsulosin without significant benefit and now on alfuzosin  Previous surgical intervention: none  Urinary retention: No PVR today was 15  Any associated gross hematuria? no  History of recurrent UTIs: no  His AUA Symptom Score is, 15/35   Patient states symptoms are of moderate severity.      Past Medical History:   Diagnosis Date    Arthritis     GERD (gastroesophageal reflux disease)     Hypertension     Lumbar compression fracture, sequela     1980    CAROLEE on CPAP     PAF (paroxysmal atrial fibrillation) ([address]     Stroke ([address] [id number]    minis stroke about 5 years ago as well, both on the left side    Stroke ([address]     10 years ago, weakness on left side       Past Surgical History:   Procedure Laterality Date    CHOLECYSTECTOMY      COLONOSCOPY  2008 [facility]    COLONOSCOPY N/A 2018    Dr Cydney ESCOBEDO (-) dysplasia x 4--6 month recall    COLONOSCOPY N/A 2018    Dr Cydney ESCOBEDO (-) dysplasia x 1, HP x 2--3 yr recall    HERNIA REPAIR      TOTAL KNEE ARTHROPLASTY Right     TOTAL KNEE ARTHROPLASTY Left     UPPER GASTROINTESTINAL ENDOSCOPY      [facility]       Current Outpatient Medications   Medication Sig Dispense Refill    amLODIPine-benazepril (LOTREL) 10-20 MG per capsule TAKE ONE CAPSULE BY MOUTH DAILY 90 capsule 0    alfuzosin (UROXATRAL) 10 MG extended release tablet Take 1 tablet by mouth daily 30 tablet 11    rivaroxaban (XARELTO) 20 MG TABS tablet Take 20 mg by mouth daily        No current facility-administered medications for this visit. Allergies   Allergen Reactions    Sulfa Antibiotics Myalgia    Statins Other (See Comments)     Weak, killed his muscles        Social History     Socioeconomic History    Marital status:    Tobacco Use    Smoking status: Former     Packs/day: 0.50     Years: 20.00     Pack years: 10.00     Types: Cigars, Cigarettes     Quit date: 2019     Years since quittin.9    Smokeless tobacco: Never    Tobacco comments:     Pt only smoked Cigars   Vaping Use    Vaping Use: Never used   Substance and Sexual Activity    Alcohol use: No    Drug use: No     Social Determinants of Health     Financial Resource Strain: Low Risk     Difficulty of Paying Living Expenses: Not hard at all   Food Insecurity: No Food Insecurity    Worried About Running Out of Food in the Last Year: Never true    Ran Out of Food in the Last Year: Never true       Family History   Problem Relation Age of Onset    Colon Cancer Neg Hx     Colon Polyps Neg Hx     Liver Cancer Neg Hx     Liver Disease Neg Hx     Esophageal Cancer Neg Hx     Stomach Cancer Neg Hx     Rectal Cancer Neg Hx        REVIEW OF SYSTEMS:  Review of Systems   Unable to perform ROS: Other   Constitutional:  Negative for chills, fatigue and fever. HENT:  Negative for congestion, ear pain and trouble swallowing. Eyes:  Negative for discharge and redness. Respiratory:  Negative for cough and shortness of breath. Cardiovascular:  Negative for chest pain. Gastrointestinal:  Negative for abdominal distention, abdominal pain, constipation, diarrhea, nausea and vomiting. Endocrine: Negative for cold intolerance and heat intolerance. Genitourinary:  Negative for difficulty urinating, dysuria, flank pain, frequency, hematuria and urgency. Musculoskeletal:  Negative for back pain and gait problem. Skin:  Negative for pallor and wound. Allergic/Immunologic: Negative for environmental allergies and immunocompromised state. Neurological:  Negative for dizziness and weakness. Hematological:  Negative for adenopathy. Does not bruise/bleed easily. Psychiatric/Behavioral:  Negative for agitation and confusion. PHYSICAL EXAM:  /80   Pulse 79   Temp 97.9 °F (36.6 °C) (Temporal)   Ht 5' 11\" (1.803 m)   Wt 229 lb (103.9 kg)   SpO2 97%   BMI 31.94 kg/m²   Physical Exam  Constitutional:       General: He is not in acute distress. Appearance: Normal appearance. He is well-developed. HENT:      Head: Normocephalic and atraumatic. Nose: Nose normal.   Eyes:      General: No scleral icterus. Conjunctiva/sclera: Conjunctivae normal.      Pupils: Pupils are equal, round, and reactive to light. Neck:      Trachea: No tracheal deviation. Cardiovascular:      Rate and Rhythm: Normal rate and regular rhythm. Pulses: Normal pulses. Pulmonary:      Effort: Pulmonary effort is normal. No respiratory distress. Breath sounds: No stridor. Abdominal:      General: There is no distension. Palpations: Abdomen is soft. There is no mass. Tenderness: There is no abdominal tenderness. Musculoskeletal:         General: No tenderness or deformity. Normal range of motion.       Cervical back: Normal range of motion and neck supple. Lymphadenopathy:      Cervical: No cervical adenopathy. Skin:     General: Skin is warm and dry. Findings: No erythema. Neurological:      General: No focal deficit present. Mental Status: He is alert and oriented to person, place, and time. Psychiatric:         Behavior: Behavior normal.         Judgment: Judgment normal.           DATA:  CMP:    Lab Results   Component Value Date/Time     04/14/2022 11:38 AM    K 4.5 04/14/2022 11:38 AM     04/14/2022 11:38 AM    CO2 26 04/14/2022 11:38 AM    BUN 22 04/14/2022 11:38 AM    CREATININE 1.1 04/14/2022 11:38 AM    GFRAA >59 04/14/2022 11:38 AM    LABGLOM >60 04/14/2022 11:38 AM    GLUCOSE 101 04/14/2022 11:38 AM    PROT 6.6 04/14/2022 11:38 AM    LABALBU 4.6 04/14/2022 11:38 AM    CALCIUM 9.9 04/14/2022 11:38 AM    BILITOT 0.4 04/14/2022 11:38 AM    ALKPHOS 89 04/14/2022 11:38 AM    AST 10 04/14/2022 11:38 AM    ALT 11 04/14/2022 11:38 AM     No results found for this visit on 08/11/22. Lab Results   Component Value Date    PSA 5.5 (H) 06/28/2022    PSA 8.57 (H) 04/14/2022    PSA 4.49 (H) 10/11/2021    PSA 5.46 (H) 04/12/2021    PSA 5.93 (H) 10/09/2020     Lab Results   Component Value Date    PSAFREEPCT 20 06/28/2022         1. Benign prostatic hyperplasia with urinary frequency  He has had no benefit from alpha-blocker tamsulosin or alfuzosin however when I suggested he consider escalating his treatment to MIPS versus TURP he wants to continue the alfuzosin at this time he does report may be some benefit but his AUA symptom score is unchanged. He may be interested in Re[facility]. He was given some information and was allowed to watch the video production and he states he will call and let us know if he decides to proceed. - [place] Measure, post-void residual, US, non-imaging    2. Elevated PSA  PSA is back down. We will continue to monitor and trend this  - PSA, Diagnostic;  Future      Orders Placed This Encounter Procedures    PSA, Diagnostic     PSA in 6 month     Standing Status:   Future     Standing Expiration Date:   8/11/2023    IN Measure, post-void residual, US, non-imaging        Return in about 6 months (around 2/11/2023) for PSA prior to vext visit. All information inputted into the note by the MA to include chief complaint, past medical history, past surgical history, medications, allergies, social and family history and review of systems has been reviewed and updated as needed by me. EMR Dragon/transcription disclaimer: Much of this documentt is electronic  transcription/translation of spoken language to printed text. The  electronic translation of spoken language may be erroneous, or at times,  nonsensical words or phrases may be inadvertently transcribed.  Although I  have reviewed the document for such errors, some may still exist.

## 2022-08-11 NOTE — PROGRESS NOTES
Laurette Cheadle is a 64 y.o. male who presents today   Chief Complaint   Patient presents with    Follow-up     I am here today for my 3 month follow up, my PSA is done      Elevated PSA:  Patient is here today for history of an elevated PSA. His last several PSA values are as follows:  Lab Results   Component Value Date    PSA 5.5 (H) 06/28/2022    PSA 8.57 (H) 04/14/2022    PSA 4.49 (H) 10/11/2021    PSA 5.46 (H) 04/12/2021    PSA 5.93 (H) 10/09/2020     Overall the PSA level is: decreased, back down to his baseline below his age-adjusted cut of 6.5  Recent history of urinary tract infection/prostatitis? no  Previous prostate biopsy? no  Lower urinary tract symptoms: urgency, frequency, decreased urinary stream, and nocturia, 2 times per night, some incomplete emptying and intermittency mild straining     BPH / LUTS:  Patient is here today for lower urinary tract symptoms which started  year(s) ago. Recently the urinary symptoms are: show no change despite him starting Uroxatrol since last visit in May 2022  Current medical Rx for BPH/LUTS: Alfuzosin. He does not feel like this is really any benefit  Previous treatments tried for LUTS: Alpha-blocker with tamsulosin without significant benefit and now on alfuzosin  Previous surgical intervention: none  Urinary retention: No PVR today was 15  Any associated gross hematuria? no  History of recurrent UTIs: no  His AUA Symptom Score is, 15/35   Patient states symptoms are of moderate severity.      Past Medical History:   Diagnosis Date    Arthritis     GERD (gastroesophageal reflux disease)     Hypertension     Lumbar compression fracture, sequela     1980    CAROLEE on CPAP     PAF (paroxysmal atrial fibrillation) ([address]     Stroke ([facility] [address] [id number]    minis stroke about 5 years ago as well, both on the left side    Stroke ([address]     10 years ago, weakness on left side       Past Surgical History:   Procedure Laterality Date    CHOLECYSTECTOMY      COLONOSCOPY  2008 [facility] [place], [facility]    COLONOSCOPY N/A 2018    Dr Judge Jono ESCOBEDO (-) dysplasia x 4--6 month recall    COLONOSCOPY N/A 2018    Dr Judge Jono ESCOBEDO (-) dysplasia x 1, HP x 2--3 yr recall    HERNIA REPAIR      TOTAL KNEE ARTHROPLASTY Right     TOTAL KNEE ARTHROPLASTY Left     UPPER GASTROINTESTINAL ENDOSCOPY      [facility] [place]       Current Outpatient Medications   Medication Sig Dispense Refill    amLODIPine-benazepril (LOTREL) 10-20 MG per capsule TAKE ONE CAPSULE BY MOUTH DAILY 90 capsule 0    alfuzosin (UROXATRAL) 10 MG extended release tablet Take 1 tablet by mouth daily 30 tablet 11    rivaroxaban (XARELTO) 20 MG TABS tablet Take 20 mg by mouth daily        No current facility-administered medications for this visit. Allergies   Allergen Reactions    Sulfa Antibiotics Myalgia    Statins Other (See Comments)     Weak, killed his muscles        Social History     Socioeconomic History    Marital status:    Tobacco Use    Smoking status: Former     Packs/day: 0.50     Years: 20.00     Pack years: 10.00     Types: Cigars, Cigarettes     Quit date: 2019     Years since quittin.9    Smokeless tobacco: Never    Tobacco comments:     Pt only smoked Cigars   Vaping Use    Vaping Use: Never used   Substance and Sexual Activity    Alcohol use: No    Drug use: No     Social Determinants of Health     Financial Resource Strain: Low Risk     Difficulty of Paying Living Expenses: Not hard at all   Food Insecurity: No Food Insecurity    Worried About Running Out of Food in the Last Year: Never true    Ran Out of Food in the Last Year: Never true       Family History   Problem Relation Age of Onset    Colon Cancer Neg Hx     Colon Polyps Neg Hx     Liver Cancer Neg Hx     Liver Disease Neg Hx     Esophageal Cancer Neg Hx     Stomach Cancer Neg Hx     Rectal Cancer Neg Hx        REVIEW OF SYSTEMS:  Review of Systems   Unable to perform ROS: Other   Constitutional:  Negative for chills, fatigue and fever. HENT:  Negative for congestion, ear pain and trouble swallowing. Eyes:  Negative for discharge and redness. Respiratory:  Negative for cough and shortness of breath. Cardiovascular:  Negative for chest pain. Gastrointestinal:  Negative for abdominal distention, abdominal pain, constipation, diarrhea, nausea and vomiting. Endocrine: Negative for cold intolerance and heat intolerance. Genitourinary:  Negative for difficulty urinating, dysuria, flank pain, frequency, hematuria and urgency. Musculoskeletal:  Negative for back pain and gait problem. Skin:  Negative for pallor and wound. Allergic/Immunologic: Negative for environmental allergies and immunocompromised state. Neurological:  Negative for dizziness and weakness. Hematological:  Negative for adenopathy. Does not bruise/bleed easily. Psychiatric/Behavioral:  Negative for agitation and confusion. PHYSICAL EXAM:  /80   Pulse 79   Temp 97.9 °F (36.6 °C) (Temporal)   Ht 5' 11\" (1.803 m)   Wt 229 lb (103.9 kg)   SpO2 97%   BMI 31.94 kg/m²   Physical Exam  Constitutional:       General: He is not in acute distress. Appearance: Normal appearance. He is well-developed. HENT:      Head: Normocephalic and atraumatic. Nose: Nose normal.   Eyes:      General: No scleral icterus. Conjunctiva/sclera: Conjunctivae normal.      Pupils: Pupils are equal, round, and reactive to light. Neck:      Trachea: No tracheal deviation. Cardiovascular:      Rate and Rhythm: Normal rate and regular rhythm. Pulses: Normal pulses. Pulmonary:      Effort: Pulmonary effort is normal. No respiratory distress. Breath sounds: No stridor. Abdominal:      General: There is no distension. Palpations: Abdomen is soft. There is no mass. Tenderness: There is no abdominal tenderness. Musculoskeletal:         General: No tenderness or deformity. Normal range of motion.       Cervical back: Normal range of motion and neck supple. Lymphadenopathy:      Cervical: No cervical adenopathy. Skin:     General: Skin is warm and dry. Findings: No erythema. Neurological:      General: No focal deficit present. Mental Status: He is alert and oriented to person, place, and time. Psychiatric:         Behavior: Behavior normal.         Judgment: Judgment normal.           DATA:  CMP:    Lab Results   Component Value Date/Time     04/14/2022 11:38 AM    K 4.5 04/14/2022 11:38 AM     04/14/2022 11:38 AM    CO2 26 04/14/2022 11:38 AM    BUN 22 04/14/2022 11:38 AM    CREATININE 1.1 04/14/2022 11:38 AM    GFRAA >59 04/14/2022 11:38 AM    LABGLOM >60 04/14/2022 11:38 AM    GLUCOSE 101 04/14/2022 11:38 AM    PROT 6.6 04/14/2022 11:38 AM    LABALBU 4.6 04/14/2022 11:38 AM    CALCIUM 9.9 04/14/2022 11:38 AM    BILITOT 0.4 04/14/2022 11:38 AM    ALKPHOS 89 04/14/2022 11:38 AM    AST 10 04/14/2022 11:38 AM    ALT 11 04/14/2022 11:38 AM     No results found for this visit on 08/11/22. Lab Results   Component Value Date    PSA 5.5 (H) 06/28/2022    PSA 8.57 (H) 04/14/2022    PSA 4.49 (H) 10/11/2021    PSA 5.46 (H) 04/12/2021    PSA 5.93 (H) 10/09/2020     Lab Results   Component Value Date    PSAFREEPCT 20 06/28/2022         1. Benign prostatic hyperplasia with urinary frequency  He has had no benefit from alpha-blocker tamsulosin or alfuzosin however when I suggested he consider escalating his treatment to MIPS versus TURP he wants to continue the alfuzosin at this time he does report may be some benefit but his AUA symptom score is unchanged. He may be interested in Re[facility]. He was given some information and was allowed to watch the video production and he states he will call and let us know if he decides to proceed. - [place] Measure, post-void residual, US, non-imaging    2. Elevated PSA  PSA is back down. We will continue to monitor and trend this  - PSA, Diagnostic;  Future      Orders Placed This Encounter Procedures    PSA, Diagnostic     PSA in 6 month     Standing Status:   Future     Standing Expiration Date:   8/11/2023    [place] Measure, post-void residual, US, non-imaging        Return in about 6 months (around 2/11/2023) for PSA prior to vext visit. All information inputted into the note by the MA to include chief complaint, past medical history, past surgical history, medications, allergies, social and family history and review of systems has been reviewed and updated as needed by me. EMR Dragon/transcription disclaimer: Much of this documentt is electronic  transcription/translation of spoken language to printed text. The  electronic translation of spoken language may be erroneous, or at times,  nonsensical words or phrases may be inadvertently transcribed.  Although I  have reviewed the document for such errors, some may still exist.

## 2022-08-15 ENCOUNTER — TELEPHONE (OUTPATIENT)
Dept: INTERNAL MEDICINE | Age: 77
End: 2022-08-15

## 2022-08-15 NOTE — TELEPHONE ENCOUNTER
Pt was in [facility] Ed for chest pain. I have called and LM for him to call us back to see about getting him scheduled for a ED follow up.

## 2022-08-18 ENCOUNTER — TELEPHONE (OUTPATIENT)
Dept: INTERNAL MEDICINE | Age: 77
End: 2022-08-18

## 2022-08-19 ENCOUNTER — HOSPITAL ENCOUNTER (OUTPATIENT)
Dept: PREADMISSION TESTING | Age: 77
Discharge: HOME | End: 2022-08-23
Payer: MEDICARE

## 2022-08-19 VITALS — HEIGHT: 71 IN | BODY MASS INDEX: 31.5 KG/M2 | WEIGHT: 225 LBS

## 2022-08-19 LAB
ANION GAP SERPL CALCULATED.3IONS-SCNC: 10 MMOL/L (ref 7–19)
BASOPHILS ABSOLUTE: 0.1 K/UL (ref 0–0.2)
BASOPHILS RELATIVE PERCENT: 0.9 % (ref 0–1)
BILIRUBIN URINE: NEGATIVE
BLOOD, URINE: NEGATIVE
BUN BLDV-MCNC: 22 MG/DL (ref 8–23)
CALCIUM SERPL-MCNC: 9.5 MG/DL (ref 8.8–10.2)
CHLORIDE BLD-SCNC: 107 MMOL/L (ref 98–111)
CLARITY: CLEAR
CO2: 26 MMOL/L (ref 22–29)
COLOR: YELLOW
CREAT SERPL-MCNC: 1 MG/DL (ref 0.5–1.2)
EOSINOPHILS ABSOLUTE: 0.2 K/UL (ref 0–0.6)
EOSINOPHILS RELATIVE PERCENT: 3.6 % (ref 0–5)
GFR AFRICAN AMERICAN: >59
GFR NON-AFRICAN AMERICAN: >60
GLUCOSE BLD-MCNC: 102 MG/DL (ref 74–109)
GLUCOSE URINE: NEGATIVE MG/DL
HCT VFR BLD CALC: 42.2 % (ref 42–52)
HEMOGLOBIN: 13.6 G/DL (ref 14–18)
IMMATURE GRANULOCYTES #: 0 K/UL
KETONES, URINE: NEGATIVE MG/DL
LEUKOCYTE ESTERASE, URINE: NEGATIVE
LYMPHOCYTES ABSOLUTE: 0.9 K/UL (ref 1.1–4.5)
LYMPHOCYTES RELATIVE PERCENT: 14.8 % (ref 20–40)
MCH RBC QN AUTO: 29.6 PG (ref 27–31)
MCHC RBC AUTO-ENTMCNC: 32.2 G/DL (ref 33–37)
MCV RBC AUTO: 91.7 FL (ref 80–94)
MONOCYTES ABSOLUTE: 0.6 K/UL (ref 0–0.9)
MONOCYTES RELATIVE PERCENT: 8.8 % (ref 0–10)
NEUTROPHILS ABSOLUTE: 4.5 K/UL (ref 1.5–7.5)
NEUTROPHILS RELATIVE PERCENT: 71.3 % (ref 50–65)
NITRITE, URINE: NEGATIVE
PDW BLD-RTO: 13.2 % (ref 11.5–14.5)
PH UA: 5.5 (ref 5–8)
PLATELET # BLD: 206 K/UL (ref 130–400)
PMV BLD AUTO: 10.4 FL (ref 9.4–12.4)
POTASSIUM SERPL-SCNC: 4 MMOL/L (ref 3.5–5)
PROTEIN UA: NEGATIVE MG/DL
RBC # BLD: 4.6 M/UL (ref 4.7–6.1)
SODIUM BLD-SCNC: 143 MMOL/L (ref 136–145)
SPECIFIC GRAVITY UA: 1.01 (ref 1–1.03)
UROBILINOGEN, URINE: 0.2 E.U./DL
WBC # BLD: 6.4 K/UL (ref 4.8–10.8)

## 2022-08-19 PROCEDURE — 81003 URINALYSIS AUTO W/O SCOPE: CPT

## 2022-08-19 PROCEDURE — 85025 COMPLETE CBC W/AUTO DIFF WBC: CPT

## 2022-08-19 PROCEDURE — 80048 BASIC METABOLIC PNL TOTAL CA: CPT

## 2022-08-19 RX ORDER — LEVOFLOXACIN 5 MG/ML
500 INJECTION, SOLUTION INTRAVENOUS ONCE
Status: CANCELLED | OUTPATIENT
Start: 2022-08-31

## 2022-08-19 NOTE — DISCHARGE INSTRUCTIONS
The day before your surgery, you will receive a phone call from the surgery nurse, to let you know what time to arrive on the day of surgery. This call will usually be between 2-4 PM.  If you do not receive a phone call by 4 PM the day before your surgery, please call [phone] and let them know you have not received an arrival time. If your surgery is on Monday, your call will be on the Friday before your Monday surgery. The morning of surgery, you may take all your prescribed medications with a sip of water. Any exceptions to this would be listed below:    THE MORNING OF SURGERY, PLEASE DO NOT TAKE YOUR LISINOPRIL. PREOPERATIVE GUIDELINES WHEN RECEIVING ANESTHESIA    Do not eat or drink anything after midnight, the night before your surgery. This is extremely important for your safety. Take a bath (or shower) the night before your surgery and you may brush your teeth the morning of your surgery. You will be scheduled to arrive at the hospital 2 hours before your surgery, or follow your surgeon's instructions. Dress comfortably. Wear loose clothing that will be easy to remove and comfortable for your trip home. You may wear eyeglasses or contacts but bring your cases with you as they must be remove before your surgery. Hearing aids and dentures will need to be removed before your surgery. Do not wear any jewelry, including body jewelry. All jewelry will need to be removed prior to your surgery. Do not wear fingernail polish or make-up. It is best not to bring any valuables with you. If you are to stay in the hospital overnight, bring your robe, slippers and personal toiletries that you may need. POSTOPERATIVE GUIDELINES AFTER RECEIVING ANESTHESIA    If you are to go home after your surgery, you will need a responsible adult to drive you home.      You will not be able to take public transportation after your discharge from the Operative Care Unit unless you are accompanied by a        responsible adult. On returning home, be sure to follow your physician's orders regarding diet, activity and medications. Remember, surgery with general anesthesia or sedation may leave you sleepy, very tired and with a decreased appetite for 12 to 24 hours. If you develop any post-surgical complications or problems, call your surgeon or [facility] Emergency Department ([phone]). [address] [place] [address] for Surgery Patients-Revised 6-    Visitors for surgery patients are essential for the patient's emotional well-being and care       post operatively. 2.   Visitor Expectations and Limitations        VISITORS MUST WEAR MASKS AT ALL TIMES. NO Cloth masks allowed. 3.  One visitor allowed with patients in the preop/postop rooms. 4.  A second visitor may sit in the waiting area. 5.  No children under 13 allowed in the pre-post op areas unless they are the patient. 6.  Two people may be with an underage surgical/procedural patient in preop/postop        room. 7.  If you are admitted to the hospital post operatively, there are NO RESTRICTIONS on       the floor at this time. 8.  If you are admitted to ICU postoperatively, you may have one visitor in the room from        7A-7P. A second visitor may sit in the ICU waiting room.   There can be no overnight

## 2022-08-24 NOTE — TELEPHONE ENCOUNTER
Iam Kennedy called requesting a refill of the below medication which has been pended for you:     Requested Prescriptions     Pending Prescriptions Disp Refills    amLODIPine-benazepril (LOTREL) 10-20 MG per capsule [Pharmacy Med Name: AMLODIPINE-BENAZEPRIL 10-20 10-20 Capsule] 90 capsule 1     Sig: TAKE ONE CAPSULE BY MOUTH DAILY       Last Appointment Date: 10/15/2021  Next Appointment Date: 4/18/2022    Allergies   Allergen Reactions    Statins Other (See Comments)     weak 5 Uris/Telemetry

## 2022-08-31 ENCOUNTER — HOSPITAL ENCOUNTER (OUTPATIENT)
Age: 77
Setting detail: OUTPATIENT SURGERY
Discharge: HOME | End: 2022-08-31
Attending: UROLOGY | Admitting: UROLOGY
Payer: MEDICARE

## 2022-08-31 ENCOUNTER — ANESTHESIA EVENT (OUTPATIENT)
Dept: OPERATING ROOM | Age: 77
End: 2022-08-31
Payer: MEDICARE

## 2022-08-31 ENCOUNTER — ANESTHESIA (OUTPATIENT)
Dept: OPERATING ROOM | Age: 77
End: 2022-08-31
Payer: MEDICARE

## 2022-08-31 VITALS
HEART RATE: 61 BPM | WEIGHT: 220 LBS | TEMPERATURE: 97.9 F | DIASTOLIC BLOOD PRESSURE: 68 MMHG | SYSTOLIC BLOOD PRESSURE: 132 MMHG | RESPIRATION RATE: 16 BRPM | HEIGHT: 71 IN | BODY MASS INDEX: 30.8 KG/M2 | OXYGEN SATURATION: 97 %

## 2022-08-31 DIAGNOSIS — N13.8 BPH WITH URINARY OBSTRUCTION: Primary | ICD-10-CM

## 2022-08-31 DIAGNOSIS — N40.1 BPH WITH URINARY OBSTRUCTION: Primary | ICD-10-CM

## 2022-08-31 PROCEDURE — 2720000010 HC SURG SUPPLY STERILE: Performed by: UROLOGY

## 2022-08-31 PROCEDURE — 2709999900 HC NON-CHARGEABLE SUPPLY: Performed by: UROLOGY

## 2022-08-31 PROCEDURE — 6360000002 HC RX W HCPCS: Performed by: UROLOGY

## 2022-08-31 PROCEDURE — 3700000000 HC ANESTHESIA ATTENDED CARE: Performed by: UROLOGY

## 2022-08-31 PROCEDURE — 3700000001 HC ADD 15 MINUTES (ANESTHESIA): Performed by: UROLOGY

## 2022-08-31 PROCEDURE — 7100000000 HC PACU RECOVERY - FIRST 15 MIN: Performed by: UROLOGY

## 2022-08-31 PROCEDURE — 7100000010 HC PHASE II RECOVERY - FIRST 15 MIN: Performed by: UROLOGY

## 2022-08-31 PROCEDURE — 53854 TRURL DSTRJ PRST8 TISS RF WV: CPT | Performed by: UROLOGY

## 2022-08-31 PROCEDURE — 3600000004 HC SURGERY LEVEL 4 BASE: Performed by: UROLOGY

## 2022-08-31 PROCEDURE — 6370000000 HC RX 637 (ALT 250 FOR IP): Performed by: ANESTHESIOLOGY

## 2022-08-31 PROCEDURE — 2580000003 HC RX 258: Performed by: ANESTHESIOLOGY

## 2022-08-31 PROCEDURE — 3600000014 HC SURGERY LEVEL 4 ADDTL 15MIN: Performed by: UROLOGY

## 2022-08-31 PROCEDURE — 7100000001 HC PACU RECOVERY - ADDTL 15 MIN: Performed by: UROLOGY

## 2022-08-31 PROCEDURE — 6360000002 HC RX W HCPCS

## 2022-08-31 PROCEDURE — 2500000003 HC RX 250 WO HCPCS

## 2022-08-31 PROCEDURE — 7100000011 HC PHASE II RECOVERY - ADDTL 15 MIN: Performed by: UROLOGY

## 2022-08-31 RX ORDER — MIDAZOLAM HYDROCHLORIDE 2 MG/2ML
2 INJECTION, SOLUTION INTRAMUSCULAR; INTRAVENOUS
Status: DISCONTINUED | OUTPATIENT
Start: 2022-08-31 | End: 2022-08-31 | Stop reason: HOSPADM

## 2022-08-31 RX ORDER — PHENAZOPYRIDINE HYDROCHLORIDE 100 MG/1
100 TABLET, FILM COATED ORAL 3 TIMES DAILY PRN
Qty: 21 TABLET | Refills: 0 | Status: SHIPPED | OUTPATIENT
Start: 2022-08-31 | End: 2022-09-07

## 2022-08-31 RX ORDER — SODIUM CHLORIDE 9 MG/ML
INJECTION, SOLUTION INTRAVENOUS CONTINUOUS
Status: DISCONTINUED | OUTPATIENT
Start: 2022-08-31 | End: 2022-08-31 | Stop reason: HOSPADM

## 2022-08-31 RX ORDER — SCOLOPAMINE TRANSDERMAL SYSTEM 1 MG/1
1 PATCH, EXTENDED RELEASE TRANSDERMAL
Status: DISCONTINUED | OUTPATIENT
Start: 2022-08-31 | End: 2022-08-31 | Stop reason: HOSPADM

## 2022-08-31 RX ORDER — COVID-19 ANTIGEN TEST
1 KIT MISCELLANEOUS 2 TIMES DAILY
Qty: 14 CAPSULE | Refills: 0 | COMMUNITY
Start: 2022-08-31 | End: 2022-10-18 | Stop reason: CLARIF

## 2022-08-31 RX ORDER — ONDANSETRON 2 MG/ML
4 INJECTION INTRAMUSCULAR; INTRAVENOUS EVERY 4 HOURS PRN
Status: DISCONTINUED | OUTPATIENT
Start: 2022-08-31 | End: 2022-08-31 | Stop reason: HOSPADM

## 2022-08-31 RX ORDER — MEPERIDINE HYDROCHLORIDE 25 MG/ML
12.5 INJECTION INTRAMUSCULAR; INTRAVENOUS; SUBCUTANEOUS EVERY 5 MIN PRN
Status: DISCONTINUED | OUTPATIENT
Start: 2022-08-31 | End: 2022-08-31 | Stop reason: HOSPADM

## 2022-08-31 RX ORDER — KETOROLAC TROMETHAMINE 30 MG/ML
15 INJECTION, SOLUTION INTRAMUSCULAR; INTRAVENOUS ONCE
Status: COMPLETED | OUTPATIENT
Start: 2022-08-31 | End: 2022-08-31

## 2022-08-31 RX ORDER — METOCLOPRAMIDE HYDROCHLORIDE 5 MG/ML
10 INJECTION INTRAMUSCULAR; INTRAVENOUS
Status: DISCONTINUED | OUTPATIENT
Start: 2022-08-31 | End: 2022-08-31 | Stop reason: HOSPADM

## 2022-08-31 RX ORDER — FAMOTIDINE 20 MG/1
20 TABLET, FILM COATED ORAL ONCE
Status: COMPLETED | OUTPATIENT
Start: 2022-08-31 | End: 2022-08-31

## 2022-08-31 RX ORDER — CIPROFLOXACIN 500 MG/1
500 TABLET, FILM COATED ORAL 2 TIMES DAILY
Qty: 20 TABLET | Refills: 0 | Status: SHIPPED | OUTPATIENT
Start: 2022-08-31 | End: 2022-09-10

## 2022-08-31 RX ORDER — MORPHINE SULFATE 2 MG/ML
2 INJECTION, SOLUTION INTRAMUSCULAR; INTRAVENOUS EVERY 5 MIN PRN
Status: DISCONTINUED | OUTPATIENT
Start: 2022-08-31 | End: 2022-08-31 | Stop reason: HOSPADM

## 2022-08-31 RX ORDER — LEVOFLOXACIN 5 MG/ML
500 INJECTION, SOLUTION INTRAVENOUS ONCE
Status: COMPLETED | OUTPATIENT
Start: 2022-08-31 | End: 2022-08-31

## 2022-08-31 RX ORDER — PROPOFOL 10 MG/ML
INJECTION, EMULSION INTRAVENOUS PRN
Status: DISCONTINUED | OUTPATIENT
Start: 2022-08-31 | End: 2022-08-31 | Stop reason: SDUPTHER

## 2022-08-31 RX ORDER — DIPHENHYDRAMINE HYDROCHLORIDE 50 MG/ML
12.5 INJECTION INTRAMUSCULAR; INTRAVENOUS
Status: DISCONTINUED | OUTPATIENT
Start: 2022-08-31 | End: 2022-08-31 | Stop reason: HOSPADM

## 2022-08-31 RX ORDER — HYDROCODONE BITARTRATE AND ACETAMINOPHEN 5; 325 MG/1; MG/1
1 TABLET ORAL EVERY 6 HOURS PRN
Qty: 8 TABLET | Refills: 0 | Status: SHIPPED | OUTPATIENT
Start: 2022-08-31 | End: 2022-09-02

## 2022-08-31 RX ORDER — ONDANSETRON 2 MG/ML
INJECTION INTRAMUSCULAR; INTRAVENOUS PRN
Status: DISCONTINUED | OUTPATIENT
Start: 2022-08-31 | End: 2022-08-31 | Stop reason: SDUPTHER

## 2022-08-31 RX ORDER — SODIUM CHLORIDE, SODIUM LACTATE, POTASSIUM CHLORIDE, CALCIUM CHLORIDE 600; 310; 30; 20 MG/100ML; MG/100ML; MG/100ML; MG/100ML
INJECTION, SOLUTION INTRAVENOUS CONTINUOUS
Status: DISCONTINUED | OUTPATIENT
Start: 2022-08-31 | End: 2022-08-31 | Stop reason: HOSPADM

## 2022-08-31 RX ORDER — DEXAMETHASONE SODIUM PHOSPHATE 10 MG/ML
INJECTION, SOLUTION INTRAMUSCULAR; INTRAVENOUS PRN
Status: DISCONTINUED | OUTPATIENT
Start: 2022-08-31 | End: 2022-08-31 | Stop reason: SDUPTHER

## 2022-08-31 RX ORDER — MORPHINE SULFATE 4 MG/ML
4 INJECTION, SOLUTION INTRAMUSCULAR; INTRAVENOUS EVERY 5 MIN PRN
Status: DISCONTINUED | OUTPATIENT
Start: 2022-08-31 | End: 2022-08-31 | Stop reason: HOSPADM

## 2022-08-31 RX ORDER — OXYCODONE HYDROCHLORIDE AND ACETAMINOPHEN 5; 325 MG/1; MG/1
2 TABLET ORAL EVERY 4 HOURS PRN
Status: DISCONTINUED | OUTPATIENT
Start: 2022-08-31 | End: 2022-08-31 | Stop reason: HOSPADM

## 2022-08-31 RX ORDER — HYDROMORPHONE HYDROCHLORIDE 1 MG/ML
0.5 INJECTION, SOLUTION INTRAMUSCULAR; INTRAVENOUS; SUBCUTANEOUS
Status: DISCONTINUED | OUTPATIENT
Start: 2022-08-31 | End: 2022-08-31 | Stop reason: HOSPADM

## 2022-08-31 RX ORDER — FENTANYL CITRATE 50 UG/ML
INJECTION, SOLUTION INTRAMUSCULAR; INTRAVENOUS PRN
Status: DISCONTINUED | OUTPATIENT
Start: 2022-08-31 | End: 2022-08-31 | Stop reason: SDUPTHER

## 2022-08-31 RX ORDER — LIDOCAINE HYDROCHLORIDE 10 MG/ML
1 INJECTION, SOLUTION EPIDURAL; INFILTRATION; INTRACAUDAL; PERINEURAL
Status: DISCONTINUED | OUTPATIENT
Start: 2022-08-31 | End: 2022-08-31 | Stop reason: HOSPADM

## 2022-08-31 RX ORDER — LIDOCAINE HYDROCHLORIDE 10 MG/ML
INJECTION, SOLUTION EPIDURAL; INFILTRATION; INTRACAUDAL; PERINEURAL PRN
Status: DISCONTINUED | OUTPATIENT
Start: 2022-08-31 | End: 2022-08-31 | Stop reason: SDUPTHER

## 2022-08-31 RX ADMIN — LEVOFLOXACIN 500 MG: 5 INJECTION, SOLUTION INTRAVENOUS at 08:13

## 2022-08-31 RX ADMIN — DEXAMETHASONE SODIUM PHOSPHATE 10 MG: 10 INJECTION, SOLUTION INTRAMUSCULAR; INTRAVENOUS at 08:14

## 2022-08-31 RX ADMIN — SODIUM CHLORIDE, SODIUM LACTATE, POTASSIUM CHLORIDE, AND CALCIUM CHLORIDE: 600; 310; 30; 20 INJECTION, SOLUTION INTRAVENOUS at 06:59

## 2022-08-31 RX ADMIN — ONDANSETRON 4 MG: 2 INJECTION INTRAMUSCULAR; INTRAVENOUS at 08:14

## 2022-08-31 RX ADMIN — FENTANYL CITRATE 25 MCG: 50 INJECTION, SOLUTION INTRAMUSCULAR; INTRAVENOUS at 08:10

## 2022-08-31 RX ADMIN — FAMOTIDINE 20 MG: 20 TABLET ORAL at 07:06

## 2022-08-31 RX ADMIN — KETOROLAC TROMETHAMINE 15 MG: 30 INJECTION, SOLUTION INTRAMUSCULAR; INTRAVENOUS at 08:44

## 2022-08-31 RX ADMIN — PROPOFOL 50 MG: 10 INJECTION, EMULSION INTRAVENOUS at 08:05

## 2022-08-31 RX ADMIN — LIDOCAINE HYDROCHLORIDE 5 ML: 10 INJECTION, SOLUTION EPIDURAL; INFILTRATION; INTRACAUDAL; PERINEURAL at 08:03

## 2022-08-31 RX ADMIN — PROPOFOL 50 MG: 10 INJECTION, EMULSION INTRAVENOUS at 08:03

## 2022-08-31 RX ADMIN — FENTANYL CITRATE 50 MCG: 50 INJECTION, SOLUTION INTRAMUSCULAR; INTRAVENOUS at 08:03

## 2022-08-31 RX ADMIN — PROPOFOL 50 MG: 10 INJECTION, EMULSION INTRAVENOUS at 08:10

## 2022-08-31 RX ADMIN — FENTANYL CITRATE 25 MCG: 50 INJECTION, SOLUTION INTRAMUSCULAR; INTRAVENOUS at 08:12

## 2022-08-31 RX ADMIN — PROPOFOL 50 MG: 10 INJECTION, EMULSION INTRAVENOUS at 08:07

## 2022-08-31 ASSESSMENT — PAIN DESCRIPTION - FREQUENCY
FREQUENCY: CONTINUOUS
FREQUENCY: CONTINUOUS

## 2022-08-31 ASSESSMENT — PAIN - FUNCTIONAL ASSESSMENT
PAIN_FUNCTIONAL_ASSESSMENT: ACTIVITIES ARE NOT PREVENTED
PAIN_FUNCTIONAL_ASSESSMENT: ACTIVITIES ARE NOT PREVENTED

## 2022-08-31 ASSESSMENT — PAIN DESCRIPTION - LOCATION
LOCATION: GROIN;PENIS
LOCATION: GROIN;PENIS

## 2022-08-31 ASSESSMENT — PAIN SCALES - GENERAL
PAINLEVEL_OUTOF10: 2
PAINLEVEL_OUTOF10: 0
PAINLEVEL_OUTOF10: 2

## 2022-08-31 ASSESSMENT — ENCOUNTER SYMPTOMS: SHORTNESS OF BREATH: 0

## 2022-08-31 ASSESSMENT — PAIN DESCRIPTION - DESCRIPTORS
DESCRIPTORS: ACHING
DESCRIPTORS: ACHING

## 2022-08-31 ASSESSMENT — LIFESTYLE VARIABLES: SMOKING_STATUS: 0

## 2022-08-31 ASSESSMENT — PAIN DESCRIPTION - PAIN TYPE
TYPE: SURGICAL PAIN
TYPE: SURGICAL PAIN

## 2022-08-31 NOTE — BRIEF OP NOTE
Brief Postoperative Note      Patient: Arcenio Thomason  YOB: 1945  MRN: [id number]    Date of Procedure: 8/31/2022    Pre-Op Diagnosis: BPH with urinary obstruction [N40.1, N13.8]    Post-Op Diagnosis: Same       Procedure(s):  REZUM RF WATER VAPOR THERAPY OF PROSTATE, CYSTOSCOPY    Surgeon(s):  Norma Torre MD    Assistant:  * No surgical staff found *    Anesthesia: General    Estimated Blood Loss (mL): 0    Complications: None    Specimens:   * No specimens in log *    Implants:  * No implants in log *      Drains:   Urinary Catheter 08/31/22 Coude (Active)       Findings: Lateral lobe obstruction. 2 treatment sites on each lateral lobe for total of 4 treatments.   25 Bruneian coudé catheter placed in the procedure    Disposition to PACU then to op care outpatient follow-up next Tuesday for cath removal follow-up to see me for postop check in 1 month    Electronically signed by Yadiel Granados MD on 8/31/2022 at 8:32 AM

## 2022-08-31 NOTE — DISCHARGE INSTRUCTIONS
[facility] Post-Procedure Sheet  Patient Instructions after Treatment    SYMPTOMS THAT MAY BE SERIOUS  YOU SHOULD CONTACT YOUR DOCTOR IMMEDIATELY IF ANY OF THE FOLLOWING OCCUR  Fever or Chills. If your oral temperature is greater than or equal to 101 degrees F, this may indicate that you have an infection that needs to be evaluated quickly. CALL your Doctor. Inability to urinate. This may indicate that swelling or a blood clot is blocking the urinary passage. If not treated this can be more and more painful. If you cannot urinate, do not drink any more fluids. CALL your doctor. Sever or uncontrollable diarrhea. This may indicate an infection or complication from the treatment. CALL your doctor. Severe pain. While there may be some pain related to the procedure, it is usually not severe. If you are experiencing sever pain or an condition you think may be serious, CALL your doctor. THE NUMBERS TO CALL IF YOU ARE HAVING A PROBLEM ARE:  [address] PHONE [phone] Nurse Line  24 HOUR PHONE [phone]    CALL 911 IF TRUE EMERGENCY                General Recommendations   Drink extra fluids (water preferred) for the first few days following the procedure. Take medications as prescribed by your doctor. Often this will include antibiotic, pain medication or antispasmotic, if needed. Limit your activities for at least the first day and be careful if you are taking any prescription medications that may cause drowsiness. If you had a catheter inserted to drain the bladder, please follow the instructions provided by your doctor. It is very important that you inform your doctor. If you have any post procedure problems, use any over-the-counter medicine or receive treatment of any kind outside the clinic in the days following your procedure. Common Treatment Related Symptoms  The following are common treatment related signs and symptoms that you may experience after the procedure.   They may also occur following removal of the catheter. Blood in the Urine. It is common to see some blood in the urine for a few weeks. There may also be some small clots or particles in the urine. Limit any physical activities and drink extra water to flush the bladder but you do not need to drink excessively or be alarmed. If you think the bleeding is excessive or you are having trouble urinating, call your doctor. Painful Urination. It is common to have some pain or burning with urination for 1 to 2 weeks. It should gradually improve. If it persists or starts to worsen, call your doctor. Slow Urinary Stream.  After the procedure, swelling of the prostate occurs which may or may not make the urinary stream weaker. This should gradually improve. If your are having a lot of difficulty trying to urinate or cannot urinate, call your doctor. Urinary Urgency, Frequency or Leakage. You may experience a strong urge to urinate while the prostate heals. Sometimes the urge is so strong it is difficult to hold your urine and leakage occurs. If this occurs, it is usually only a small amount and temporary. Urinary Symptoms. After the procedure, it is expected that your general urinary symptoms may temporarily worsen due to localized swelling and then gradually improve. Blood in the Semen. This may occur up to several weeks following the procedure. The semen may have red or a jehtro color. This condition will almost always resolve without any treatment. You should not be alarmed. Frequently Asked Questions  The following are general guidelines to follow after undergoing the procedure and/or having the catheter removed. When can I resume normal physical activities? You may resume your daily activities immediately. There are no specific limitations if you work out in a gym, but you should limit vigorous workouts such as a bike riding, running, treadmills, and heavy weight- lifting for the first week.   If you experience any blood in your urine, limit your activities and increase your water intake. When can I resume sexual activity? In general, once you do not see any blood in the urine you may resume sexual activity. You may experience blood in the semen as described above. When can I resume my medications including blood thinners? In general, you should continue with any of your regular medicines, however, check with your doctor particularly if you are a diabetic. Your doctor will determine when to start blood thinners again, it is often on the day of the procedure. When can I go back to work? In general you can return to work the following day. If you are taking pain medication (narcotics) or have a physical job (lifting, constructions work etc), check with your doctor. When can I have an alcoholic beverage? Alcohol is a bladder irritant and may exacerbate urinary symptoms, so many doctors will advise to limit alcohol intake until urinary symptoms have subsided. However, if you choose to drink alcohol you should wait atleast 24 hours following the procedure. You should also follow the instructions regarding alcohol intake on labels of your prescription medication. Alcohol should NOT be consumed while using any narcotic or MOST antibiotics.

## 2022-08-31 NOTE — ANESTHESIA PRE PROCEDURE
Department of Anesthesiology  Preprocedure Note       Name:  Carlie Cano   Age:  64 y.o.  :  1945                                          MRN:  [id number]         Date:  2022      Surgeon: Mamie Beaver):  Tyree Walters MD    Procedure: Procedure(s):  REZUM RF WATER VAPOR THERAPY OF PROSTATE    Medications prior to admission:   Prior to Admission medications    Medication Sig Start Date End Date Taking?  Authorizing Provider   amLODIPine-benazepril (LOTREL) 10-20 MG per capsule TAKE ONE CAPSULE BY MOUTH DAILY 22   Theo Tran MD   alfuzosin [place]) 10 MG extended release tablet Take 1 tablet by mouth daily 22   Tyree Walters MD   rivaroxaban (XARELTO) 20 MG TABS tablet Take 20 mg by mouth daily  18   Historical Provider, MD       Current medications:    Current Facility-Administered Medications   Medication Dose Route Frequency Provider Last Rate Last Admin    lactated ringers infusion   IntraVENous Continuous Antonella Denney MD        levoFLOXacin (LEVAQUIN) 500 MG/100ML infusion 500 mg  500 mg IntraVENous Once Tyree Walters MD           Allergies:  No Known Allergies    Problem List:    Patient Active Problem List   Diagnosis Code    Essential hypertension I10    Pure hypercholesterolemia E78.00    CAROLEE on CPAP G47.33, Z99.89    Cerebrovascular disease I67.9    Cerebral arteriosclerosis with history of previous stroke I67.2, Z86.73    IFG (impaired fasting glucose) R73.01    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    Elevated PSA R97.20    PAF (paroxysmal atrial fibrillation) (HCC) I48.0    Lumbar compression fracture, sequela S32.000S    Weakness R53.1    Gait abnormality R26.9    History of stroke Z86.73    Low back pain M54.50    Statin intolerance Z78.9    Atypical chest pain R07.89    Hemorrhoids K64.9    Left hemiparesis ([address] G81.94    Aortic root enlargement (HCC) I77.89    Benign prostatic hyperplasia with urinary frequency N40.1, R35.0       Past Medical History:        Diagnosis Date    Arthritis     GERD (gastroesophageal reflux disease)     Hypertension     Lumbar compression fracture, sequela     1980    CAROLEE on CPAP     PAF (paroxysmal atrial fibrillation) ([facility]     Stroke ([facility] [phone]    minis stroke about 5 years ago as well, both on the left side    Stroke ([facility] [address]     10 years ago, weakness on left side       Past Surgical History:        Procedure Laterality Date    ABLATION OF DYSRHYTHMIC FOCUS  08/04/2021    [place] FOR TREATMENT OF AFIB    CHOLECYSTECTOMY      [facility]  2008    [facility]    COLONOSCOPY N/A 06/25/2018    Dr Washington ESCOBEDO (-) dysplasia x 4--6 month recall    COLONOSCOPY N/A 12/12/2018    Dr Washington ESCOBEDO (-) dysplasia x 1, HP x 2--3 yr recall    HERNIA REPAIR      TOTAL KNEE ARTHROPLASTY Right     TOTAL KNEE ARTHROPLASTY Left     UPPER GASTROINTESTINAL ENDOSCOPY  2008    [facility]       Social History:    Social History     Tobacco Use    Smoking status: Some Days     Packs/day: 0.50     Years: 20.00     Pack years: 10.00     Types: Cigars, Cigarettes     Last attempt to quit: 8/14/2019     Years since quitting: 3.0    Smokeless tobacco: Never    Tobacco comments:     Pt only smoked Cigars   Substance Use Topics    Alcohol use:  No                                Ready to quit: Not Answered  Counseling given: Not Answered  Tobacco comments: Pt only smoked Cigars      Vital Signs (Current):   Vitals:    08/31/22 0637   BP: 134/72   Pulse: 80   Resp: 18   Temp: 97.8 °F (36.6 °C)   TempSrc: Temporal   SpO2: 96%   Weight: 220 lb (99.8 kg)   Height: 5' 11\" (1.803 m)                                              BP Readings from Last 3 Encounters:   08/31/22 134/72   08/11/22 138/80   07/18/22 110/68       NPO Status: Time of last liquid consumption: 0000                        Time of last solid consumption: 0000                        Date of last liquid consumption: 08/30/22                        Date of last solid food consumption: 08/30/22    BMI:   Wt Readings from Last 3 Encounters:   08/31/22 220 lb (99.8 kg)   08/19/22 225 lb (102.1 kg)   08/11/22 229 lb (103.9 kg)     Body mass index is 30.68 kg/m². CBC:   Lab Results   Component Value Date/Time    WBC 6.4 08/19/2022 09:30 AM    RBC 4.60 08/19/2022 09:30 AM    HGB 13.6 08/19/2022 09:30 AM    HCT 42.2 08/19/2022 09:30 AM    MCV 91.7 08/19/2022 09:30 AM    RDW 13.2 08/19/2022 09:30 AM     08/19/2022 09:30 AM       CMP:   Lab Results   Component Value Date/Time     08/19/2022 09:30 AM    K 4.0 08/19/2022 09:30 AM     08/19/2022 09:30 AM    CO2 26 08/19/2022 09:30 AM    BUN 22 08/19/2022 09:30 AM    CREATININE 1.0 08/19/2022 09:30 AM    GFRAA >59 08/19/2022 09:30 AM    LABGLOM >60 08/19/2022 09:30 AM    GLUCOSE 102 08/19/2022 09:30 AM    PROT 6.6 04/14/2022 11:38 AM    CALCIUM 9.5 08/19/2022 09:30 AM    BILITOT 0.4 04/14/2022 11:38 AM    ALKPHOS 89 04/14/2022 11:38 AM    AST 10 04/14/2022 11:38 AM    ALT 11 04/14/2022 11:38 AM       POC Tests: No results for input(s): POCGLU, POCNA, POCK, POCCL, POCBUN, POCHEMO, POCHCT in the last 72 hours.     Coags: No results found for: PROTIME, INR, APTT    HCG (If Applicable): No results found for: PREGTESTUR, PREGSERUM, HCG, HCGQUANT     ABGs: No results found for: PHART, PO2ART, AMH6QJA, MKY0NJK, BEART, F8KSPSRN     Type & Screen (If Applicable):  No results found for: LABABO, LABRH    Drug/Infectious Status (If Applicable):  No results found for: HIV, HEPCAB    COVID-19 Screening (If Applicable): No results found for: COVID19        Anesthesia Evaluation  Patient summary reviewed and Nursing notes reviewed no history of anesthetic complications:   Airway: Mallampati: III  TM distance: >3 FB   Neck ROM: full  Mouth opening: > = 3 FB   Dental: normal exam         Pulmonary:Negative Pulmonary ROS and normal exam  breath sounds clear to auscultation  (+) sleep apnea: on CPAP,      (-) shortness of breath and not a current smoker          Patient did not smoke on day of surgery. Cardiovascular:    (+) hypertension:,     (-) CAD,  angina and  CHF    NYHA Classification: I  ECG reviewed  Rhythm: regular  Rate: normal           Beta Blocker:  Not on Beta Blocker         Neuro/Psych:   Negative Neuro/Psych ROS  (+) CVA: residual symptoms,    (-) seizures and depression/anxiety            GI/Hepatic/Renal: Neg GI/Hepatic/Renal ROS  (+) GERD:,      (-) hiatal hernia       Endo/Other: Negative Endo/Other ROS             Pt had PAT visit. Abdominal:   (+) obese,     Abdomen: soft. Vascular: Other Findings:           Anesthesia Plan      general     ASA 3     (Iv zofran within 30 min of closing )  Induction: intravenous. BIS  MIPS: Postoperative opioids intended and Prophylactic antiemetics administered. Anesthetic plan and risks discussed with patient. Use of blood products discussed with patient whom. Plan discussed with CRNA.     Attending anesthesiologist reviewed and agrees with Pre Eval content                Ish Price MD   8/31/2022

## 2022-08-31 NOTE — OP NOTE
Operative Note      Patient: Blaire Aviles  YOB: 1945  MRN: [id number]    Date of Procedure: 8/31/2022    Pre-Op Diagnosis: BPH with urinary obstruction [N40.1, N13.8]    Post-Op Diagnosis: Same       Procedure(s):  REZUM RF WATER VAPOR THERAPY OF PROSTATE, CYSTOSCOPY    Surgeon(s):  Corliss Runner, MD    Assistant:   * No surgical staff found *    Anesthesia: General    Estimated Blood Loss (mL): 0    Complications: None    Specimens:   * No specimens in log *    Implants:  * No implants in log *      Drains:   Urinary Catheter 08/31/22 Coude (Active)       Findings: Lateral lobe enlargement bilaterally. 2 treatment sites each lateral lobe. 25 Mauritian coudé catheter placed    Detailed Description of Procedure:   HPI   See presurgical HPI    Patient is here for Rezum RF water vapor therapy of the prostate. Please see the above HPI. The procedure has been explained to the patient in detail. The risk, possible benefits and alternatives have also been discussed. Patient did received preprocedure instructions. He has taken his preprocedure oral sedation medications and antibiotics. 80 mg of IM gentamicin is also given. Anticoagulation and antiplatelet therapies have been held. He has done a preprocedure enema. The Patient identity is confirmed and timeout is performed at the beginning procedure. Preprocedure vital signs with pulse and blood pressure are obtained and recorded by the MA as well as post procedure vital signs recorded. Patient was monitored throughout the procedure carefully. Consent was obtained. Risks and complications of the procedure were discussed with the patient he understands that he will be sent home post procedure with a Don catheter for 4 to 5 days and maybe up to 1 week depending on the number of treatments given and whether he had preprocedure retention or incomplete emptying. We discussed the risk of post procedure urinary retention.   He does understand he fields of view apart proceeding distal to the level of verumontanum the most distal site confirmed to be inside and  proximal to the verumontanum on direct visualization. Then moving to the left lateral lobe at 3 o'clock this side was treated in the similar manner. 2 treatment sites were given on the right, 2 treatment sites were given on the left. Treatments were also given at the median lobe none; Or treatments were given at the central zone none. The treatment sites were performed by deploying the needle at the selected site of ablation and actuating for 9 seconds. Treatment of the median lobe or central zone was performed by treating 2  fields-of-view from the bladder opening and by rotating medially and deploying the needle at 45 degrees into the lobe. At the completion of the procedure treatment the device was removed. An 25 Pashto catheter was placed this was hand irrigated 10 cc was placed in the balloon. This was hooked to leg bag. Patient will be sent home with Don catheter. Written post procedure instructions were given to the patient. Complications:  None; patient tolerated the procedure well. Disposition: To home after observation. Condition: stable    Patient will follow up as instructed with the nurse for catheter removal      Disposition patient to PACU then to op care outpatient. Patient will follow-up next Tuesday for cath removal follow-up to see me in 1 month.        Electronically signed by Tiffany Saldivar MD on 8/31/2022 at 8:34 AM No

## 2022-08-31 NOTE — PROGRESS NOTES
CLINICAL PHARMACY NOTE: MEDS TO BEDS    Total # of Prescriptions Filled: 3   The following medications were delivered to the patient:  Ciprofloxacin 500 mg  Phenazopyridine 100 mg  Hydrocodone-APAP 5-325 mg    Additional Documentation:   Delivered Rx's to op-care. Gave Rx's to patients caretaker Barbie Medley. Barbie Medley paid $33.77 copays with credit card.

## 2022-08-31 NOTE — INTERVAL H&P NOTE
Update History & Physical    The patient's History and Physical of August 11, 2022 was reviewed with the patient and I examined the patient. There was no change. The surgical site was confirmed by the patient and me. Pt has elected to precede with Rezum      Risks and complications of the procedure were discussed with the patient he understands that he will be sent home post procedure with a Don catheter for 4 to 5 days and maybe up to 1 week depending on the number of treatments given and whether he had preprocedure retention or incomplete emptying. We discussed the risk of post procedure urinary retention. He does understand he will have post procedure irritative voiding symptoms that may last 1 to 2 weeks. These shall improve as his body heals and his symptoms improve over the next 30 days. He is to continue all prescribed medications including his current medications for BPH until instructed to discontinue. We also discussed the risk of bleeding, infection, worsening irritative voiding symptoms which are a common side effect. He does understand there is some risk of stricture formation from instrumentation. Incontinence, impotence, sexual dysfunction, or ejaculatory dysfunction are not without risk but are less often seen with Rezum treatment. He does understand there is risk of failure to improve symptoms which may require continued medical therapy versus small chance of needing additional surgical intervention. He is agreeable to proceed. Plan: The risks, benefits, expected outcome, and alternative to the recommended procedure have been discussed with the patient. Patient understands and wants to proceed with the procedure.      Electronically signed by Britt Lara MD on 8/31/2022 at 7:41 AM

## 2022-09-06 ENCOUNTER — NURSE ONLY (OUTPATIENT)
Dept: UROLOGY | Age: 77
End: 2022-09-06
Payer: MEDICARE

## 2022-09-06 DIAGNOSIS — N40.1 BPH WITH URINARY OBSTRUCTION: Primary | ICD-10-CM

## 2022-09-06 DIAGNOSIS — N13.8 BPH WITH URINARY OBSTRUCTION: Primary | ICD-10-CM

## 2022-09-06 PROCEDURE — 51700 IRRIGATION OF BLADDER: CPT | Performed by: NURSE PRACTITIONER

## 2022-09-20 ENCOUNTER — TELEPHONE (OUTPATIENT)
Dept: INTERNAL MEDICINE | Age: 77
End: 2022-09-20

## 2022-09-27 ENCOUNTER — OFFICE VISIT (OUTPATIENT)
Dept: INTERNAL MEDICINE | Age: 77
End: 2022-09-27
Payer: MEDICARE

## 2022-09-27 VITALS
WEIGHT: 225 LBS | HEIGHT: 71 IN | HEART RATE: 81 BPM | OXYGEN SATURATION: 97 % | BODY MASS INDEX: 31.5 KG/M2 | RESPIRATION RATE: 18 BRPM | DIASTOLIC BLOOD PRESSURE: 70 MMHG | SYSTOLIC BLOOD PRESSURE: 112 MMHG

## 2022-09-27 DIAGNOSIS — B96.20 BACTEREMIA DUE TO ESCHERICHIA COLI: ICD-10-CM

## 2022-09-27 DIAGNOSIS — R91.1 LEFT UPPER LOBE PULMONARY NODULE: ICD-10-CM

## 2022-09-27 DIAGNOSIS — N10 ACUTE PYELONEPHRITIS: Primary | ICD-10-CM

## 2022-09-27 DIAGNOSIS — J21.9 ACUTE BRONCHITIS AND BRONCHIOLITIS: ICD-10-CM

## 2022-09-27 DIAGNOSIS — R78.81 BACTEREMIA DUE TO ESCHERICHIA COLI: ICD-10-CM

## 2022-09-27 DIAGNOSIS — J20.9 ACUTE BRONCHITIS AND BRONCHIOLITIS: ICD-10-CM

## 2022-09-27 PROCEDURE — 99496 TRANSJ CARE MGMT HIGH F2F 7D: CPT | Performed by: INTERNAL MEDICINE

## 2022-09-27 RX ORDER — AZITHROMYCIN 250 MG/1
250 TABLET, FILM COATED ORAL SEE ADMIN INSTRUCTIONS
Qty: 6 TABLET | Refills: 0 | Status: SHIPPED | OUTPATIENT
Start: 2022-09-27 | End: 2022-10-02

## 2022-09-27 RX ORDER — CEFDINIR 300 MG/1
300 CAPSULE ORAL 2 TIMES DAILY
COMMUNITY
Start: 2022-09-20 | End: 2022-10-02

## 2022-09-27 ASSESSMENT — ENCOUNTER SYMPTOMS
CONSTIPATION: 0
COUGH: 1
WHEEZING: 0
SINUS PAIN: 1
ABDOMINAL PAIN: 0
SORE THROAT: 0
CHEST TIGHTNESS: 0

## 2022-09-27 NOTE — PROGRESS NOTES
Hospital Follow-up Visit      Carlie Cano   YOB: 1945    Date of Visit:  9/27/2022    Vitals:    09/27/22 1134   BP: 112/70   Site: Left Upper Arm   Position: Sitting   Cuff Size: Large Adult   Pulse: 81   Resp: 18   SpO2: 97%   Weight: 225 lb (102.1 kg)   Height: 5' 11\" (1.803 m)     Body mass index is 31.38 kg/m². Wt Readings from Last 3 Encounters:   09/27/22 225 lb (102.1 kg)   08/31/22 220 lb (99.8 kg)   08/19/22 225 lb (102.1 kg)     BP Readings from Last 3 Encounters:   09/27/22 112/70   08/31/22 132/68   08/11/22 138/80       No Known Allergies  Outpatient Medications Marked as Taking for the 9/27/22 encounter (Office Visit) with Theo Tran MD   Medication Sig Dispense Refill    cefdinir (OMNICEF) 300 MG capsule Take 300 mg by mouth 2 times daily      rivaroxaban (XARELTO) 20 MG TABS tablet Take 1 tablet by mouth daily 30 tablet 0    Naproxen Sodium 220 MG CAPS Take 1 capsule by mouth in the morning and at bedtime for 7 days Then as needed for discomfort after 7 days 14 capsule 0    amLODIPine-benazepril (LOTREL) 10-20 MG per capsule TAKE ONE CAPSULE BY MOUTH DAILY 90 capsule 0    alfuzosin (UROXATRAL) 10 MG extended release tablet Take 1 tablet by mouth daily 30 tablet 11         CC:  Patient presents for hospital discharge follow-upafter admission   (following highlighted text has been copied from this specialist note)    [address]     Date of Admission: 9/16/2022  Date of Discharge: 9/19/2022  Primary Care Physician: Manolo Mendez MD    Presenting Problem/Chief Complaint:  fever 102 °F, chills, and generalized weakness    Final Discharge Diagnoses:   Active Hospital Problems   Diagnosis    **Acute pyelonephritis    Bacteremia due to Escherichia coli    BPH with urinary obstruction    Aortic ectasia, thoracic (HCC)    Current use of long term anticoagulation    Class 1 obesity in adult    CAROLEE on CPAP    PAF (paroxysmal atrial fibrillation) (formerly [facility])    Essential hypertension     Consults: None. Procedures Performed: None. Pertinent Test Results:   Results for orders placed during the hospital encounter of 06/18/21    Hospital Course: The patient is a 64 y.o. male who presented to [facility] on 9/16 with fever 102 °F, chills, and generalized weakness. He went Rezum RFA autoregulatory therapy of prostate, cystoscopy with Dr. Linette Brown on 8/31/2022. In the ED, CT abdomen pelvis showed findings concerning for pyelonephritis. CT angiogram chest negative for pulmonary embolism. Ectasia of the ascending thoracic aorta measuring 4.1 cm, similar to prior study. Lactate 3.7. He received 1 L IV fluid bolus. He was admitted to the hospitalist service for further evaluation and management. Acute polynephritis with bacteremia due to E. coli. Surveillance culture with no growth to date. Urine and blood culture shows E. coli susceptible to ceftriaxone. He has received 3 days of IV ceftriaxone, will transition to SCHILLING REILLY to complete a total of 14 days antibiotic therapy. He has remained afebrile for 48 hours and WBC has down trended     History of paroxysmal atrial fibrillation chronically anticoagulated on Xarelto. Xarelto will serve as DVT prophylaxis. CT chest or PET recommended in 3 months as outpatient to follow-up 1.0 x 1.0 cm nodular area of the left upper lobe. Known history of aortic ectasia and pulmonary nodules. Continue routine monitoring. Overall, patient reports feeling well. States that he feels much better. He feels ready for discharge. He is medically stable and appropriate for discharge home today. He is to follow-up with Dr. Oquendo Begun in 1 week. Condition on Discharge: Medically stable. Medications are reconciled and reviewed. Inpatient course: Discharge summary reviewed- see chart.     Current status: stable    Results  CT angiogram of chest 9/17/2022  1. No evidence of pulmonary embolus. 2.  Ectasia of the ascending thoracic aorta measuring 4.1 cm, similar to   the prior study. 3.  No definite confluent pneumonia but there is some bronchial wall   thickening and hazy opacities, likely atelectasis. Underlying   inflammatory process such as bronchitis and pneumonitis to be   considered. 4.  Probable area of scarring in the inferior lingular segment of the   LEFT upper lobe. This was present on the previous examination as well   but has a somewhat nodular appearance. The nodular component measures   1.0 x 1.0 cm. Per Fleischner Society guidelines, in 3 months, CT chest   or PET/CT should be considered. 5.  Redemonstration RIGHT lobe thyroid mass. 6.  Moderate coronary atherosclerosis. CT abdomen and pelvis 9/17/2022  Impression      1. Thickening of the wall of the urinary bladder as well as   perivesicular fat stranding. In addition there is some fat stranding   extending along the bilateral ureters, LEFT greater than RIGHT. Mild   nonspecific perinephric fat stranding as well. Urinary infection   including both lower and upper urinary tracts should be considered. 2.  Mild enlargement of the prostate gland. It causes mild mass effect   on the urinary bladder. A preliminary report was provided by [employer] [place] [employer]. This report was finalized on 09/17/2022 08:22 by Dr. Radha De Leon MD.    For review  Most recent lab  9/18 BMP-all normal    9/18 CBC still shows elevated white cell count 12.58 which is improvement, hemoglobin 12 hematocrit 38.1    Initial blood cultures positive for E. Coli    repeat blood cultures negative after IV treatments  Review of Systems   Constitutional:  Positive for fatigue. Negative for chills and fever. HENT:  Positive for congestion, postnasal drip and sinus pain. Negative for ear pain, nosebleeds and sore throat. Respiratory:  Positive for cough. Negative for chest tightness and wheezing. Cardiovascular:  Negative for chest pain, palpitations and leg swelling. Gastrointestinal:  Negative for abdominal pain and constipation. Genitourinary:  Negative for dysuria and urgency. Musculoskeletal: Negative. Negative for arthralgias. Skin:  Negative for rash. Neurological:  Negative for dizziness and headaches. Psychiatric/Behavioral: Negative. Physical Exam  Constitutional:       Appearance: He is well-developed. HENT:      Right Ear: External ear normal.      Left Ear: External ear normal.      Mouth/Throat:      Pharynx: No oropharyngeal exudate. Eyes:      Conjunctiva/sclera: Conjunctivae normal.      Pupils: Pupils are equal, round, and reactive to light. Neck:      Thyroid: No thyromegaly. Vascular: No JVD. Cardiovascular:      Rate and Rhythm: Normal rate. Heart sounds: Normal heart sounds. No murmur heard. Pulmonary:      Effort: No respiratory distress. Breath sounds: Rhonchi and rales present. No wheezing. Chest:      Chest wall: No tenderness. Abdominal:      General: Bowel sounds are normal.      Palpations: Abdomen is soft. Musculoskeletal:      Cervical back: Neck supple. Lymphadenopathy:      Cervical: No cervical adenopathy. Skin:     General: Skin is warm. Findings: No rash. Neurological:      Mental Status: He is oriented to person, place, and time.      Lab Results   Component Value Date/Time     08/19/2022 09:30 AM    K 4.0 08/19/2022 09:30 AM     08/19/2022 09:30 AM    CO2 26 08/19/2022 09:30 AM    BUN 22 08/19/2022 09:30 AM    CREATININE 1.0 08/19/2022 09:30 AM    GLUCOSE 102 08/19/2022 09:30 AM    CALCIUM 9.5 08/19/2022 09:30 AM     Lab Results   Component Value Date/Time    WBC 6.4 08/19/2022 09:30 AM    WBC 6.3 10/11/2021 08:53 AM    WBC 6.4 10/09/2020 10:41 AM    HGB 13.6 08/19/2022 09:30 AM    HGB 14.3 10/11/2021 08:53 AM    HGB 14.3 10/09/2020 10:41 AM    HCT 42.2 08/19/2022 09:30 AM    HCT 45.4 10/11/2021 08:53 AM    HCT 45.1 10/09/2020 10:41 AM    MCV 91.7 08/19/2022 09:30 AM    MCV 91.3 10/11/2021 08:53 AM    MCV 88.6 10/09/2020 10:41 AM     08/19/2022 09:30 AM     10/11/2021 08:53 AM     10/09/2020 10:41 AM     Lab Results   Component Value Date/Time    CHOL 180 04/14/2022 11:38 AM    TRIG 145 04/14/2022 11:38 AM    HDL 42 04/14/2022 11:38 AM       Assessment/Plan:    Acute pyelonephritis    Bacteremia due to Escherichia coli    This happened post prostates surgery and Don catheter    Complete omnicef total 10 days  Increase fluids  FU urology    Left upper lobe pulmonary nodule  Results as above  Obtain repeat CT 12/2022  -     CT CHEST W CONTRAST; Future    Acute bronchitis and bronchiolitis  Still coughing since DC  He is on omnicef  Add zithromax  Take daily xyzal  Use albutero inhaler  If sx not improving and resolving , if sx continue or re-occur pt has been instructed to call us and / or return here for follow- up evaluation        Diagnostic test results reviewed    Patient risk of morbidity and mortality: low  This is high complexity TCM visit  Patient was called within 2 business days of discharge    Family able to provide care to patient at home  No other needs including PT, OT needs detected at this time  additional provider follow-ups needed at this time- urology      Orders Placed This Encounter   Procedures    CT CHEST W CONTRAST     Standing Status:   Future     Standing Expiration Date:   9/27/2023     Scheduling Instructions:      [employer]     Order Specific Question:   STAT Creatinine as needed:     Answer:   No       No follow-ups on file.

## 2022-09-29 ENCOUNTER — HOSPITAL ENCOUNTER (OUTPATIENT)
Dept: GENERAL RADIOLOGY | Age: 77
Discharge: HOME | End: 2022-09-29
Payer: MEDICARE

## 2022-09-29 ENCOUNTER — TELEPHONE (OUTPATIENT)
Dept: INTERNAL MEDICINE | Age: 77
End: 2022-09-29

## 2022-09-29 DIAGNOSIS — R05.9 COUGH: ICD-10-CM

## 2022-09-29 DIAGNOSIS — R05.9 COUGH: Primary | ICD-10-CM

## 2022-09-29 PROCEDURE — 71046 X-RAY EXAM CHEST 2 VIEWS: CPT

## 2022-09-29 RX ORDER — PROMETHAZINE HYDROCHLORIDE AND CODEINE PHOSPHATE 6.25; 1 MG/5ML; MG/5ML
5 SYRUP ORAL EVERY 12 HOURS PRN
Qty: 140 ML | Refills: 0 | Status: SHIPPED | OUTPATIENT
Start: 2022-09-29 | End: 2022-10-13

## 2022-09-29 NOTE — TELEPHONE ENCOUNTER
S/w pt, he was here on Tuesday and would like cough syrup sent to [facility] pharmacy.

## 2022-10-03 ENCOUNTER — OFFICE VISIT (OUTPATIENT)
Dept: UROLOGY | Age: 77
End: 2022-10-03
Payer: MEDICARE

## 2022-10-03 VITALS
OXYGEN SATURATION: 98 % | HEART RATE: 88 BPM | WEIGHT: 224.6 LBS | TEMPERATURE: 97.6 F | HEIGHT: 71 IN | SYSTOLIC BLOOD PRESSURE: 132 MMHG | DIASTOLIC BLOOD PRESSURE: 76 MMHG | BODY MASS INDEX: 31.44 KG/M2

## 2022-10-03 DIAGNOSIS — N13.8 BPH WITH URINARY OBSTRUCTION: Primary | ICD-10-CM

## 2022-10-03 DIAGNOSIS — N40.1 BPH WITH URINARY OBSTRUCTION: Primary | ICD-10-CM

## 2022-10-03 LAB
BACTERIA URINE, POC: 0
BILIRUBIN URINE: 0 MG/DL
BLOOD, URINE: POSITIVE
CASTS URINE, POC: 0
CLARITY: CLEAR
COLOR: YELLOW
CRYSTALS URINE, POC: 0
EPI CELLS URINE, POC: 0
GLUCOSE URINE: NORMAL
KETONES, URINE: NEGATIVE
LEUKOCYTE EST, POC: NORMAL
NITRITE, URINE: NEGATIVE
PH UA: 6.5 (ref 4.5–8)
PROTEIN UA: NEGATIVE
RBC URINE, POC: 4
SPECIFIC GRAVITY UA: 1.02 (ref 1–1.03)
UROBILINOGEN, URINE: NORMAL
WBC URINE, POC: 6
YEAST URINE, POC: 0

## 2022-10-03 PROCEDURE — 99024 POSTOP FOLLOW-UP VISIT: CPT | Performed by: UROLOGY

## 2022-10-03 PROCEDURE — 81001 URINALYSIS AUTO W/SCOPE: CPT | Performed by: UROLOGY

## 2022-10-03 PROCEDURE — 51798 US URINE CAPACITY MEASURE: CPT | Performed by: UROLOGY

## 2022-10-03 RX ORDER — ROSUVASTATIN CALCIUM 20 MG/1
TABLET, COATED ORAL
COMMUNITY
Start: 2022-09-28

## 2022-10-03 ASSESSMENT — ENCOUNTER SYMPTOMS
CHEST TIGHTNESS: 0
VOMITING: 0
BACK PAIN: 0
WHEEZING: 0
EYE DISCHARGE: 0
FACIAL SWELLING: 0
SORE THROAT: 0
NAUSEA: 0
EYE REDNESS: 0

## 2022-10-03 NOTE — PROGRESS NOTES
Thomas Turk is a 64 y.o. male who presents today   Chief Complaint   Patient presents with    Follow-up     I am here today for a 1 month post op. Post-Operative Follow-up  Patient here for post-op follow-up. Patient is 1 month status post Rezum RF water vapor therapy. Done on 8/31/2022. His catheter was removed on 9/6/2022. He then developed urinary tract infection and was hospitalized at [facility] on 9/16/2022 until 9/19/2022   Cultures grew out E. coli treated with IV Rocephin. E. coli was resistant to Cipro which we had given him for antibiotic prophylaxis post procedure. He did complete a course of Omnicef. He has recovered doing better. His AUA symptom score is 12/35. Preop score was 15 the patient reports he feels like his urinary symptoms are improved. He is off his alfuzosin.      Past Medical History:   Diagnosis Date    Arthritis     GERD (gastroesophageal reflux disease)     Hypertension     Lumbar compression fracture, sequela     1980    CAROLEE on CPAP     PAF (paroxysmal atrial fibrillation) ([address]     Stroke ([address] [id number]    minis stroke about 5 years ago as well, both on the left side    Stroke ([address]     10 years ago, weakness on left side       Past Surgical History:   Procedure Laterality Date    ABLATION OF DYSRHYTHMIC FOCUS  08/04/2021    IN [place] FOR TREATMENT OF AFIB    CHOLECYSTECTOMY      COLONOSCOPY  2008    [facility], [place]    COLONOSCOPY N/A 06/25/2018    Dr Lauren ESCOBEDO (-) dysplasia x 4--6 month recall    COLONOSCOPY N/A 12/12/2018    Dr Lauren ESCOBEDO (-) dysplasia x 1, HP x 2--3 yr recall    HERNIA REPAIR      TOTAL KNEE ARTHROPLASTY Right     TOTAL KNEE ARTHROPLASTY Left     TURP N/A 8/31/2022    REZUM RF WATER VAPOR THERAPY OF PROSTATE, CYSTOSCOPY performed by Aminah Baldwin MD at [facility]  2008    [facility], [place]       Current Outpatient Medications   Medication Sig Dispense Refill    rosuvastatin (CRESTOR) 20 MG tablet promethazine-codeine (PHENERGAN WITH CODEINE) 6.25-10 MG/5ML syrup Take 5 mLs by mouth every 12 hours as needed for Cough for up to 14 days. 140 mL 0    rivaroxaban (XARELTO) 20 MG TABS tablet Take 1 tablet by mouth daily 30 tablet 0    amLODIPine-benazepril (LOTREL) 10-20 MG per capsule TAKE ONE CAPSULE BY MOUTH DAILY 90 capsule 0    Naproxen Sodium 220 MG CAPS Take 1 capsule by mouth in the morning and at bedtime for 7 days Then as needed for discomfort after 7 days 14 capsule 0    alfuzosin (UROXATRAL) 10 MG extended release tablet Take 1 tablet by mouth daily (Patient not taking: Reported on 10/3/2022) 30 tablet 11     No current facility-administered medications for this visit. No Known Allergies    Social History     Socioeconomic History    Marital status:      Spouse name: None    Number of children: None    Years of education: None    Highest education level: None   Tobacco Use    Smoking status: Some Days     Packs/day: 0.50     Years: 20.00     Pack years: 10.00     Types: Cigars, Cigarettes     Last attempt to quit: 8/14/2019     Years since quitting: 3.1    Smokeless tobacco: Never    Tobacco comments:     Pt only smoked Cigars   Vaping Use    Vaping Use: Never used   Substance and Sexual Activity    Alcohol use: No    Drug use: No     Social Determinants of Health     Financial Resource Strain: Low Risk     Difficulty of Paying Living Expenses: Not hard at all   Food Insecurity: No Food Insecurity    Worried About Running Out of Food in the Last Year: Never true    Ran Out of Food in the Last Year: Never true       Family History   Problem Relation Age of Onset    Colon Cancer Neg Hx     Colon Polyps Neg Hx     Liver Cancer Neg Hx     Liver Disease Neg Hx     Esophageal Cancer Neg Hx     Stomach Cancer Neg Hx     Rectal Cancer Neg Hx        REVIEW OF SYSTEMS:  Review of Systems   Constitutional:  Negative for chills and fever. HENT:  Negative for facial swelling and sore throat. Eyes:  Negative for discharge and redness. Respiratory:  Negative for chest tightness and wheezing. Cardiovascular:  Negative for chest pain and palpitations. Gastrointestinal:  Negative for nausea and vomiting. Endocrine: Negative for polyphagia and polyuria. Genitourinary:  Negative for decreased urine volume, difficulty urinating, dysuria, enuresis, flank pain, frequency, genital sores, hematuria, penile discharge, penile pain, penile swelling, scrotal swelling, testicular pain and urgency. Musculoskeletal:  Negative for back pain and neck stiffness. Skin:  Negative for rash and wound. Neurological:  Negative for dizziness and headaches. Hematological:  Negative for adenopathy. Does not bruise/bleed easily. Psychiatric/Behavioral:  Negative for confusion and hallucinations.         PHYSICAL EXAM:  /76   Pulse 88   Temp 97.6 °F (36.4 °C)   Ht 5' 11\" (1.803 m)   Wt 224 lb 9.6 oz (101.9 kg)   SpO2 98%   BMI 31.33 kg/m²   Physical Exam        DATA:  CBC:   Lab Results   Component Value Date/Time    WBC 6.4 08/19/2022 09:30 AM    RBC 4.60 08/19/2022 09:30 AM    HGB 13.6 08/19/2022 09:30 AM    HCT 42.2 08/19/2022 09:30 AM    MCV 91.7 08/19/2022 09:30 AM    MCH 29.6 08/19/2022 09:30 AM    MCHC 32.2 08/19/2022 09:30 AM    RDW 13.2 08/19/2022 09:30 AM     08/19/2022 09:30 AM    MPV 10.4 08/19/2022 09:30 AM     CMP:    Lab Results   Component Value Date/Time     08/19/2022 09:30 AM    K 4.0 08/19/2022 09:30 AM     08/19/2022 09:30 AM    CO2 26 08/19/2022 09:30 AM    BUN 22 08/19/2022 09:30 AM    CREATININE 1.0 08/19/2022 09:30 AM    GFRAA >59 08/19/2022 09:30 AM    LABGLOM >60 08/19/2022 09:30 AM    GLUCOSE 102 08/19/2022 09:30 AM    PROT 6.6 04/14/2022 11:38 AM    LABALBU 4.6 04/14/2022 11:38 AM    CALCIUM 9.5 08/19/2022 09:30 AM    BILITOT 0.4 04/14/2022 11:38 AM    ALKPHOS 89 04/14/2022 11:38 AM    AST 10 04/14/2022 11:38 AM    ALT 11 04/14/2022 11:38 AM     Results for orders placed or performed in visit on 10/03/22   POCT Urinalysis Dipstick w/ Micro (Auto)   Result Value Ref Range    Color, UA Yellow     Clarity, UA Clear Clear    Glucose, Ur neg     Bilirubin Urine 0 mg/dL    Ketones, Urine Negative     Specific Gravity, UA 1.020 1.005 - 1.030    Blood, Urine Positive     pH, UA 6.5 4.5 - 8.0    Protein, UA Negative Negative    Nitrite, Urine Negative     Leukocytes, UA moderate     Urobilinogen, Urine Normal     RBC Urine, POC 4     WBC Urine, POC 6     Bacteria Urine, POC 0     yeast urine, poc 0     Casts Urine, POC 0     Epi Cells Urine, POC 0     crystals urine, poc 0      Lab Results   Component Value Date    PSA 5.5 (H) 06/28/2022    PSA 8.57 (H) 04/14/2022    PSA 4.49 (H) 10/11/2021    PSA 5.46 (H) 04/12/2021    PSA 5.93 (H) 10/09/2020     Lab Results   Component Value Date    PSAFREEPCT 20 06/28/2022       1. BPH with urinary obstruction  Patient developed UTI post procedure but not until about 10 days after his catheter was removed. He was treated appropriately with culture Civic antibiotics and has improved. He does feel like the procedure has improved his urination. He is off alfuzosin  - SC Measure, post-void residual, US, non-imaging  - POCT Urinalysis Dipstick w/ Micro (Auto)      Orders Placed This Encounter   Procedures    POCT Urinalysis Dipstick w/ Micro (Auto)    SC Measure, post-void residual, US, non-imaging     22ml        Return in about 3 months (around 1/3/2023) for [facility] f/u. All information inputted into the note by the MA to include chief complaint, past medical history, past surgical history, medications, allergies, social and family history and review of systems has been reviewed and updated as needed by me. EMR Dragon/transcription disclaimer: Much of this documentt is electronic  transcription/translation of spoken language to printed text.  The  electronic translation of spoken language may be erroneous, or at times,  nonsensical words or phrases may be inadvertently transcribed.  Although I  have reviewed the document for such errors, some may still exist.

## 2022-10-13 DIAGNOSIS — R91.8 MULTIPLE LUNG NODULES ON CT: ICD-10-CM

## 2022-10-13 DIAGNOSIS — G81.94 LEFT HEMIPARESIS (HCC): ICD-10-CM

## 2022-10-13 DIAGNOSIS — R73.01 IFG (IMPAIRED FASTING GLUCOSE): ICD-10-CM

## 2022-10-13 DIAGNOSIS — I48.0 PAF (PAROXYSMAL ATRIAL FIBRILLATION) (HCC): ICD-10-CM

## 2022-10-13 DIAGNOSIS — I10 ESSENTIAL HYPERTENSION: ICD-10-CM

## 2022-10-13 DIAGNOSIS — I77.89 AORTIC ROOT ENLARGEMENT (HCC): ICD-10-CM

## 2022-10-13 DIAGNOSIS — Z78.9 STATIN INTOLERANCE: ICD-10-CM

## 2022-10-13 DIAGNOSIS — R97.20 ELEVATED PSA: ICD-10-CM

## 2022-10-13 DIAGNOSIS — G47.33 OSA ON CPAP: ICD-10-CM

## 2022-10-13 DIAGNOSIS — E78.00 PURE HYPERCHOLESTEROLEMIA: ICD-10-CM

## 2022-10-13 DIAGNOSIS — Z99.89 OSA ON CPAP: ICD-10-CM

## 2022-10-13 LAB
ALBUMIN SERPL-MCNC: 4.4 G/DL (ref 3.5–5.2)
ALP BLD-CCNC: 83 U/L (ref 40–130)
ALT SERPL-CCNC: 14 U/L (ref 5–41)
ANION GAP SERPL CALCULATED.3IONS-SCNC: 12 MMOL/L (ref 7–19)
AST SERPL-CCNC: 14 U/L (ref 5–40)
BACTERIA: ABNORMAL /HPF
BASOPHILS ABSOLUTE: 0.1 K/UL (ref 0–0.2)
BASOPHILS RELATIVE PERCENT: 0.7 % (ref 0–1)
BILIRUB SERPL-MCNC: 0.3 MG/DL (ref 0.2–1.2)
BILIRUBIN URINE: NEGATIVE
BLOOD, URINE: ABNORMAL
BUN BLDV-MCNC: 21 MG/DL (ref 8–23)
CALCIUM SERPL-MCNC: 9.5 MG/DL (ref 8.8–10.2)
CHLORIDE BLD-SCNC: 107 MMOL/L (ref 98–111)
CHOLESTEROL, TOTAL: 99 MG/DL (ref 160–199)
CLARITY: ABNORMAL
CO2: 24 MMOL/L (ref 22–29)
COLOR: YELLOW
CREAT SERPL-MCNC: 1 MG/DL (ref 0.5–1.2)
CRYSTALS, UA: ABNORMAL /HPF
EOSINOPHILS ABSOLUTE: 0.3 K/UL (ref 0–0.6)
EOSINOPHILS RELATIVE PERCENT: 4.3 % (ref 0–5)
EPITHELIAL CELLS, UA: 0 /HPF (ref 0–5)
GFR AFRICAN AMERICAN: >59
GFR NON-AFRICAN AMERICAN: >60
GLUCOSE BLD-MCNC: 109 MG/DL (ref 74–109)
GLUCOSE URINE: NEGATIVE MG/DL
HBA1C MFR BLD: 5.9 % (ref 4–6)
HCT VFR BLD CALC: 42.4 % (ref 42–52)
HDLC SERPL-MCNC: 40 MG/DL (ref 55–121)
HEMOGLOBIN: 12.9 G/DL (ref 14–18)
HYALINE CASTS: 1 /HPF (ref 0–8)
IMMATURE GRANULOCYTES #: 0 K/UL
KETONES, URINE: NEGATIVE MG/DL
LDL CHOLESTEROL CALCULATED: 43 MG/DL
LEUKOCYTE ESTERASE, URINE: ABNORMAL
LYMPHOCYTES ABSOLUTE: 1.1 K/UL (ref 1.1–4.5)
LYMPHOCYTES RELATIVE PERCENT: 15.3 % (ref 20–40)
MCH RBC QN AUTO: 28.9 PG (ref 27–31)
MCHC RBC AUTO-ENTMCNC: 30.4 G/DL (ref 33–37)
MCV RBC AUTO: 94.9 FL (ref 80–94)
MONOCYTES ABSOLUTE: 0.7 K/UL (ref 0–0.9)
MONOCYTES RELATIVE PERCENT: 9.3 % (ref 0–10)
NEUTROPHILS ABSOLUTE: 5 K/UL (ref 1.5–7.5)
NEUTROPHILS RELATIVE PERCENT: 69.8 % (ref 50–65)
NITRITE, URINE: POSITIVE
PDW BLD-RTO: 13.2 % (ref 11.5–14.5)
PH UA: 5.5 (ref 5–8)
PLATELET # BLD: 221 K/UL (ref 130–400)
PMV BLD AUTO: 10.3 FL (ref 9.4–12.4)
POTASSIUM SERPL-SCNC: 3.9 MMOL/L (ref 3.5–5)
PROTEIN UA: 30 MG/DL
RBC # BLD: 4.47 M/UL (ref 4.7–6.1)
RBC UA: 16 /HPF (ref 0–4)
SODIUM BLD-SCNC: 143 MMOL/L (ref 136–145)
SPECIFIC GRAVITY UA: 1.01 (ref 1–1.03)
TOTAL PROTEIN: 7.3 G/DL (ref 6.6–8.7)
TRIGL SERPL-MCNC: 81 MG/DL (ref 0–149)
TSH SERPL DL<=0.05 MIU/L-ACNC: 0.09 UIU/ML (ref 0.27–4.2)
UROBILINOGEN, URINE: 0.2 E.U./DL
VITAMIN D 25-HYDROXY: 33.3 NG/ML
WBC # BLD: 7.2 K/UL (ref 4.8–10.8)
WBC UA: 793 /HPF (ref 0–5)

## 2022-10-18 ENCOUNTER — OFFICE VISIT (OUTPATIENT)
Dept: INTERNAL MEDICINE | Age: 77
End: 2022-10-18
Payer: MEDICARE

## 2022-10-18 VITALS
HEIGHT: 71 IN | WEIGHT: 224 LBS | SYSTOLIC BLOOD PRESSURE: 122 MMHG | OXYGEN SATURATION: 98 % | RESPIRATION RATE: 18 BRPM | DIASTOLIC BLOOD PRESSURE: 68 MMHG | HEART RATE: 74 BPM | BODY MASS INDEX: 31.36 KG/M2

## 2022-10-18 DIAGNOSIS — I10 ESSENTIAL HYPERTENSION: ICD-10-CM

## 2022-10-18 DIAGNOSIS — E78.00 PURE HYPERCHOLESTEROLEMIA: ICD-10-CM

## 2022-10-18 DIAGNOSIS — Z00.00 MEDICARE ANNUAL WELLNESS VISIT, SUBSEQUENT: Primary | ICD-10-CM

## 2022-10-18 DIAGNOSIS — G47.33 OSA ON CPAP: ICD-10-CM

## 2022-10-18 DIAGNOSIS — Z99.89 OSA ON CPAP: ICD-10-CM

## 2022-10-18 DIAGNOSIS — I67.2 CEREBRAL ARTERIOSCLEROSIS WITH HISTORY OF PREVIOUS STROKE: ICD-10-CM

## 2022-10-18 DIAGNOSIS — R91.1 LEFT UPPER LOBE PULMONARY NODULE: ICD-10-CM

## 2022-10-18 DIAGNOSIS — R73.01 IFG (IMPAIRED FASTING GLUCOSE): ICD-10-CM

## 2022-10-18 DIAGNOSIS — Z78.9 STATIN INTOLERANCE: ICD-10-CM

## 2022-10-18 DIAGNOSIS — D64.89 ANEMIA DUE TO MULTIPLE MECHANISMS: ICD-10-CM

## 2022-10-18 DIAGNOSIS — E04.1 THYROID NODULE: ICD-10-CM

## 2022-10-18 DIAGNOSIS — Z86.73 CEREBRAL ARTERIOSCLEROSIS WITH HISTORY OF PREVIOUS STROKE: ICD-10-CM

## 2022-10-18 DIAGNOSIS — G81.94 LEFT HEMIPARESIS (HCC): ICD-10-CM

## 2022-10-18 PROCEDURE — 99214 OFFICE O/P EST MOD 30 MIN: CPT | Performed by: INTERNAL MEDICINE

## 2022-10-18 PROCEDURE — G8427 DOCREV CUR MEDS BY ELIG CLIN: HCPCS | Performed by: INTERNAL MEDICINE

## 2022-10-18 PROCEDURE — G8484 FLU IMMUNIZE NO ADMIN: HCPCS | Performed by: INTERNAL MEDICINE

## 2022-10-18 PROCEDURE — 1123F ACP DISCUSS/DSCN MKR DOCD: CPT | Performed by: INTERNAL MEDICINE

## 2022-10-18 PROCEDURE — 4004F PT TOBACCO SCREEN RCVD TLK: CPT | Performed by: INTERNAL MEDICINE

## 2022-10-18 PROCEDURE — G8417 CALC BMI ABV UP PARAM F/U: HCPCS | Performed by: INTERNAL MEDICINE

## 2022-10-18 PROCEDURE — G0439 PPPS, SUBSEQ VISIT: HCPCS | Performed by: INTERNAL MEDICINE

## 2022-10-18 SDOH — HEALTH STABILITY: PHYSICAL HEALTH: ON AVERAGE, HOW MANY DAYS PER WEEK DO YOU ENGAGE IN MODERATE TO STRENUOUS EXERCISE (LIKE A BRISK WALK)?: 2 DAYS

## 2022-10-18 SDOH — HEALTH STABILITY: PHYSICAL HEALTH: ON AVERAGE, HOW MANY MINUTES DO YOU ENGAGE IN EXERCISE AT THIS LEVEL?: 30 MIN

## 2022-10-18 SDOH — ECONOMIC STABILITY: FOOD INSECURITY: WITHIN THE PAST 12 MONTHS, YOU WORRIED THAT YOUR FOOD WOULD RUN OUT BEFORE YOU GOT MONEY TO BUY MORE.: NEVER TRUE

## 2022-10-18 SDOH — ECONOMIC STABILITY: FOOD INSECURITY: WITHIN THE PAST 12 MONTHS, THE FOOD YOU BOUGHT JUST DIDN'T LAST AND YOU DIDN'T HAVE MONEY TO GET MORE.: NEVER TRUE

## 2022-10-18 ASSESSMENT — PATIENT HEALTH QUESTIONNAIRE - PHQ9
SUM OF ALL RESPONSES TO PHQ9 QUESTIONS 1 & 2: 1
SUM OF ALL RESPONSES TO PHQ QUESTIONS 1-9: 1
SUM OF ALL RESPONSES TO PHQ QUESTIONS 1-9: 1
1. LITTLE INTEREST OR PLEASURE IN DOING THINGS: 1
2. FEELING DOWN, DEPRESSED OR HOPELESS: 0
SUM OF ALL RESPONSES TO PHQ QUESTIONS 1-9: 1
SUM OF ALL RESPONSES TO PHQ QUESTIONS 1-9: 1

## 2022-10-18 ASSESSMENT — ENCOUNTER SYMPTOMS
BACK PAIN: 1
WHEEZING: 0
SORE THROAT: 0
COUGH: 0
ABDOMINAL PAIN: 0
CHEST TIGHTNESS: 0
CONSTIPATION: 0

## 2022-10-18 ASSESSMENT — LIFESTYLE VARIABLES
HOW MANY STANDARD DRINKS CONTAINING ALCOHOL DO YOU HAVE ON A TYPICAL DAY: PATIENT DECLINED
HOW OFTEN DO YOU HAVE SIX OR MORE DRINKS ON ONE OCCASION: 98
HOW OFTEN DO YOU HAVE A DRINK CONTAINING ALCOHOL: NEVER
HOW OFTEN DO YOU HAVE A DRINK CONTAINING ALCOHOL: 1
HOW MANY STANDARD DRINKS CONTAINING ALCOHOL DO YOU HAVE ON A TYPICAL DAY: 98

## 2022-10-18 ASSESSMENT — SOCIAL DETERMINANTS OF HEALTH (SDOH): HOW HARD IS IT FOR YOU TO PAY FOR THE VERY BASICS LIKE FOOD, HOUSING, MEDICAL CARE, AND HEATING?: NOT HARD AT ALL

## 2022-10-18 NOTE — PATIENT INSTRUCTIONS
Personalized Preventive Plan for Myrna Perez - 10/18/2022  Medicare offers a range of preventive health benefits. Some of the tests and screenings are paid in full while other may be subject to a deductible, co-insurance, and/or copay. Some of these benefits include a comprehensive review of your medical history including lifestyle, illnesses that may run in your family, and various assessments and screenings as appropriate. After reviewing your medical record and screening and assessments performed today your provider may have ordered immunizations, labs, imaging, and/or referrals for you. A list of these orders (if applicable) as well as your Preventive Care list are included within your After Visit Summary for your review. Other Preventive Recommendations:    A preventive eye exam performed by an eye specialist is recommended every 1-2 years to screen for glaucoma; cataracts, macular degeneration, and other eye disorders. A preventive dental visit is recommended every 6 months. Try to get at least 150 minutes of exercise per week or 10,000 steps per day on a pedometer . Order or download the FREE \"Exercise & Physical Activity: Your Everyday Guide\" from [employer]. Call 4-161.374.5285 or search [employer] online. You need 0989-2619 mg of calcium and 7423-4335 IU of vitamin D per day. It is possible to meet your calcium requirement with diet alone, but a vitamin D supplement is usually necessary to meet this goal.  When exposed to the sun, use a sunscreen that protects against both UVA and UVB radiation with an SPF of 30 or greater. Reapply every 2 to 3 hours or after sweating, drying off with a towel, or swimming. Always wear a seat belt when traveling in a car. Always wear a helmet when riding a bicycle or motorcycle.

## 2022-10-18 NOTE — PROGRESS NOTES
Chief Complaint   Patient presents with    Multiple medical problems     History of presenting illness:  Rony Hernandez is a64 y.o. male who presents today for follow up on his chronic medical conditions as noted below.     Patient Active Problem List    Diagnosis Date Noted    BPH with urinary obstruction 05/16/2022     Priority: Medium    Aortic root enlargement ([address] 10/14/2020    Statin intolerance 04/09/2019    Weakness 10/19/2018    Gait abnormality 10/19/2018    History of stroke 10/19/2018    Low back pain 10/19/2018    Hemorrhoids 09/24/2018    Elevated PSA 09/18/2018    PAF (paroxysmal atrial fibrillation) ([address] 09/18/2018    Lumbar compression fracture, sequela 09/18/2018    Left hemiparesis ([address] 04/11/2018    Essential hypertension 03/13/2018    Pure hypercholesterolemia 03/13/2018    CAROLEE on CPAP 03/13/2018    Cerebrovascular disease 03/13/2018    Cerebral arteriosclerosis with history of previous stroke 03/13/2018    IFG (impaired fasting glucose) 03/13/2018    Exogenous obesity 03/13/2018    Atypical chest pain 12/28/2016     Past Medical History:   Diagnosis Date    Arthritis     GERD (gastroesophageal reflux disease)     Hypertension     Lumbar compression fracture, sequela     1980    CAROLEE on CPAP     PAF (paroxysmal atrial fibrillation) ([address]     Stroke ([address] [id number]    minis stroke about 5 years ago as well, both on the left side    Stroke ([address]     10 years ago, weakness on left side      Past Surgical History:   Procedure Laterality Date    ABLATION OF DYSRHYTHMIC FOCUS  08/04/2021    IN [place] FOR TREATMENT OF AFIB    CHOLECYSTECTOMY      COLONOSCOPY  2008    [facility]    COLONOSCOPY N/A 06/25/2018    Dr Alondra Rojas AP (-) dysplasia x 4--6 month recall    COLONOSCOPY N/A 12/12/2018    Dr Arvin Bullock AP (-) dysplasia x 1, HP x 2--3 yr recall    HERNIA REPAIR      TOTAL KNEE ARTHROPLASTY Right     TOTAL KNEE ARTHROPLASTY Left     TURP N/A 8/31/2022    FELIX RF WATER VAPOR THERAPY OF PROSTATE, CYSTOSCOPY performed by Ag Montgomery MD at [facility] [address]  2008    [facility]     Current Outpatient Medications   Medication Sig Dispense Refill    rosuvastatin (CRESTOR) 20 MG tablet       rivaroxaban (XARELTO) 20 MG TABS tablet Take 1 tablet by mouth daily 30 tablet 0    amLODIPine-benazepril (LOTREL) 10-20 MG per capsule TAKE ONE CAPSULE BY MOUTH DAILY 90 capsule 0     No current facility-administered medications for this visit. No Known Allergies  Social History     Tobacco Use    Smoking status: Some Days     Packs/day: 0.50     Years: 20.00     Pack years: 10.00     Types: Cigars, Cigarettes     Last attempt to quit: 8/14/2019     Years since quitting: 3.1    Smokeless tobacco: Never    Tobacco comments:     Pt only smoked Cigars   Substance Use Topics    Alcohol use: No      Family History   Problem Relation Age of Onset    Colon Cancer Neg Hx     Colon Polyps Neg Hx     Liver Cancer Neg Hx     Liver Disease Neg Hx     Esophageal Cancer Neg Hx     Stomach Cancer Neg Hx     Rectal Cancer Neg Hx        Review of Systems   Constitutional:  Negative for chills, fatigue and fever. HENT:  Negative for congestion, ear pain, nosebleeds, postnasal drip and sore throat. Respiratory:  Negative for cough, chest tightness and wheezing. Cardiovascular:  Negative for chest pain, palpitations and leg swelling. Gastrointestinal:  Negative for abdominal pain and constipation. Genitourinary:  Negative for dysuria and urgency. Musculoskeletal:  Positive for arthralgias and back pain. Skin:  Negative for rash. Neurological:  Negative for dizziness and headaches. Psychiatric/Behavioral: Negative. Vitals:    10/18/22 0914   BP: 122/68   Site: Left Upper Arm   Position: Sitting   Cuff Size: Large Adult   Pulse: 74   Resp: 18   SpO2: 98%   Weight: 224 lb (101.6 kg)   Height: 5' 11\" (1.803 m)     Body mass index is 31.24 kg/m².     Physical Exam  Constitutional:       Appearance: He is well-developed. HENT:      Right Ear: External ear normal.      Left Ear: External ear normal.      Mouth/Throat:      Pharynx: No oropharyngeal exudate. Eyes:      Conjunctiva/sclera: Conjunctivae normal.      Pupils: Pupils are equal, round, and reactive to light. Neck:      Thyroid: No thyromegaly. Vascular: No JVD. Cardiovascular:      Rate and Rhythm: Normal rate. Heart sounds: Normal heart sounds. No murmur heard. Pulmonary:      Effort: No respiratory distress. Breath sounds: Rales present. No wheezing. Chest:      Chest wall: No tenderness. Abdominal:      General: Bowel sounds are normal.      Palpations: Abdomen is soft. Musculoskeletal:      Cervical back: Neck supple. Lymphadenopathy:      Cervical: No cervical adenopathy. Skin:     Findings: No rash. Neurological:      Mental Status: He is oriented to person, place, and time.        Lab Review   Orders Only on 10/13/2022   Component Date Value    Vit D, 25-Hydroxy 10/13/2022 33.3     TSH 10/13/2022 0.092 (A)     Color, UA 10/13/2022 YELLOW     Clarity, UA 10/13/2022 TURBID (A)     Glucose, Ur 10/13/2022 Negative     Bilirubin Urine 10/13/2022 Negative     Ketones, Urine 10/13/2022 Negative     Specific Gravity, UA 10/13/2022 1.014     Blood, Urine 10/13/2022 MODERATE (A)     pH, UA 10/13/2022 5.5     Protein, UA 10/13/2022 30 (A)     Urobilinogen, Urine 10/13/2022 0.2     Nitrite, Urine 10/13/2022 POSITIVE (A)     Leukocyte Esterase, Urine 10/13/2022 LARGE (A)     Cholesterol, Total 10/13/2022 99 (A)     Triglycerides 10/13/2022 81     HDL 10/13/2022 40 (A)     LDL Calculated 10/13/2022 43     WBC 10/13/2022 7.2     RBC 10/13/2022 4.47 (A)     Hemoglobin 10/13/2022 12.9 (A)     Hematocrit 10/13/2022 42.4     MCV 10/13/2022 94.9 (A)     MCH 10/13/2022 28.9     MCHC 10/13/2022 30.4 (A)     RDW 10/13/2022 13.2     Platelets 99/95/1866 221     MPV 10/13/2022 10.3 Neutrophils % 10/13/2022 69.8 (A)     Lymphocytes % 10/13/2022 15.3 (A)     Monocytes % 10/13/2022 9.3     Eosinophils % 10/13/2022 4.3     Basophils % 10/13/2022 0.7     Neutrophils Absolute 10/13/2022 5.0     Immature Granulocytes # 10/13/2022 0.0     Lymphocytes Absolute 10/13/2022 1.1     Monocytes Absolute 10/13/2022 0.70     Eosinophils Absolute 10/13/2022 0.30     Basophils Absolute 10/13/2022 0.10     Sodium 10/13/2022 143     Potassium 10/13/2022 3.9     Chloride 10/13/2022 107     CO2 10/13/2022 24     Anion Gap 10/13/2022 12     Glucose 10/13/2022 109     BUN 10/13/2022 21     Creatinine 10/13/2022 1.0     GFR Non- 10/13/2022 >60     GFR  10/13/2022 >59     Calcium 10/13/2022 9.5     Total Protein 10/13/2022 7.3     Albumin 10/13/2022 4.4     Total Bilirubin 10/13/2022 0.3     Alkaline Phosphatase 10/13/2022 83     ALT 10/13/2022 14     AST 10/13/2022 14     Hemoglobin A1C 10/13/2022 5.9     Bacteria, UA 10/13/2022 4+ (A)     Crystals, UA 10/13/2022 NEG (A)     Hyaline Casts, UA 10/13/2022 1     WBC, UA 10/13/2022 793 (A)     RBC, UA 10/13/2022 16 (A)     Epithelial Cells, UA 10/13/2022 0    Office Visit on 10/03/2022   Component Date Value    Color, UA 10/03/2022 Yellow     Clarity, UA 10/03/2022 Clear     Glucose, Ur 10/03/2022 neg     Bilirubin Urine 10/03/2022 0     Ketones, Urine 10/03/2022 Negative     Specific Gravity, UA 10/03/2022 1.020     Blood, Urine 10/03/2022 Positive     pH, UA 10/03/2022 6.5     Protein, UA 10/03/2022 Negative     Nitrite, Urine 10/03/2022 Negative     Leukocytes, UA 10/03/2022 moderate     Urobilinogen, Urine 10/03/2022 Normal     RBC Urine, POC 10/03/2022 4     WBC Urine, POC 10/03/2022 6     Bacteria Urine, POC 10/03/2022 0     yeast urine, poc 10/03/2022 0     Casts Urine, POC 10/03/2022 0     Epi Cells Urine, POC 10/03/2022 0     crystals urine, poc 10/03/2022 0    Hospital Outpatient Visit on 08/19/2022   Component Date Value    Sodium 08/19/2022 143     Potassium 08/19/2022 4.0     Chloride 08/19/2022 107     CO2 08/19/2022 26     Anion Gap 08/19/2022 10     Glucose 08/19/2022 102     BUN 08/19/2022 22     Creatinine 08/19/2022 1.0     GFR Non- 08/19/2022 >60     GFR  08/19/2022 >59     Calcium 08/19/2022 9.5     WBC 08/19/2022 6.4     RBC 08/19/2022 4.60 (A)     Hemoglobin 08/19/2022 13.6 (A)     Hematocrit 08/19/2022 42.2     MCV 08/19/2022 91.7     MCH 08/19/2022 29.6     MCHC 08/19/2022 32.2 (A)     RDW 08/19/2022 13.2     Platelets 75/11/4680 206     MPV 08/19/2022 10.4     Neutrophils % 08/19/2022 71.3 (A)     Lymphocytes % 08/19/2022 14.8 (A)     Monocytes % 08/19/2022 8.8     Eosinophils % 08/19/2022 3.6     Basophils % 08/19/2022 0.9     Neutrophils Absolute 08/19/2022 4.5     Immature Granulocytes # 08/19/2022 0.0     Lymphocytes Absolute 08/19/2022 0.9 (A)     Monocytes Absolute 08/19/2022 0.60     Eosinophils Absolute 08/19/2022 0.20     Basophils Absolute 08/19/2022 0.10     Color, UA 08/19/2022 YELLOW     Clarity, UA 08/19/2022 Clear     Glucose, Ur 08/19/2022 Negative     Bilirubin Urine 08/19/2022 Negative     Ketones, Urine 08/19/2022 Negative     Specific Gravity, UA 08/19/2022 1.015     Blood, Urine 08/19/2022 Negative     pH, UA 08/19/2022 5.5     Protein, UA 08/19/2022 Negative     Urobilinogen, Urine 08/19/2022 0.2     Nitrite, Urine 08/19/2022 Negative     Leukocyte Esterase, Urine 08/19/2022 Negative            ASSESSMENT/PLAN:    Pure hypercholesterolemia  Statin intolerance hx  Lab results reviewed and DW pt in detail  LDL 43  TG nl range  Had previous issues with statin related myopathy ( tried crestor and liptor)   RX recommended with his prior history of stroke.  RX crestor 20 mg daily  Healthy, mostly fiber rich nonstarchy plant-based diet recommended  Recommend to decrease intake of processed foods, simple carbohydrates and animal-based products that high in saturated fats        IFG (impaired fasting glucose)   Lab results dw pt in detail  a1c higher at 5.9 in 10/2022  diet and weight loss recommended     Exogenous obesity  Pt was given counseling about diet and exercise including education on what calories are, where calories come from, the need for portion control,following lower carbohydrate dietary regimen and healthy snacks along side an active lifestyle with supplementary exercise of approx 30 minutes a week, 5 days a week of exercise for weight loss. The patient voiced increased understanding of the topics discussed. He has history of PAF  Follows with Dr. Cordelia Siemens for PAF and history of cryptogenic stroke  Has recently seen Dr. Cordelia Siemens  He is wearing loop recorder since about a year ago  His echocardiogram that was done just in March 2020  Result Narrative   · No evidence of pulmonary hypertension is present. · Estimated EF = 65%. · Left ventricular systolic function is normal.  · Left ventricular diastolic dysfunction. · Mild dilation of the aortic root is present. Aortic root dilatation  at approximately 42 mm in maximal dimension in the ascending aorta  As per dr Cordelia Siemens   FU 11/2022     Essential hypertension  Blood pressure readings reviewed  he will continue amlodipine 10, benazepril 20     ANEMia present during recent admission for pyelonephritis  Hemoglobin now improved at 12.9 in 10/2022 with hematocrit 42.4        CAROLEE on CPAP  Not doing well since equipment is not working  Frequently at night his CPAP just stops working  Pt needs new equipement  Pt does benefit from CPAP  Orders sent to [facility]  Cont current settings     Post CVA  Residual mild LEFT LE weakness  Stable  Again, unable to take statin/statin intolerance    Thyroid nodule per CT:  large heterogeneous partially calcified mass in the right  lobe of the thyroid gland measuring 0.2 x 2.9 cm  Ultrasound of the thyroid      Impression   1.  Dominant right thyroid nodule, which meets criteria for ultrasound-guided fine-needle aspiration if not previously performed. Signed by Dr Kailey Carmona       Seen dr Barbie Montoya  (following highlighted text has been copied from this specialist note)  Patient has low risk thyroid nodule. I am concerned about the size and possible compression. He has no compressive symptoms. After discussion of risk, benefits, alternative treatments, options, the patient wishes continued observation. He is currently on blood thinners. He is at low risk for malignancy. Thyroid ultrasound-1 year, ordered  Call for any growth in the neck       Has fu appt with ENT 2/2023     Left upper lobe pulmonary nodule  Results as above  Obtain repeat CT 12/2022  -     CT CHEST W CONTRAST; Future      Orders Placed This Encounter   Procedures    Comprehensive Metabolic Panel    Hemoglobin A1C    Lipid Panel       New Prescriptions    No medications on file         Return in 6 months (on 4/18/2023) for Medicare Annual Wellness Visit in 1 year, Medication check. EMR Dragon/transcription disclaimer:Significant part of this  encounter note is electronic transcription/translationof spoken language to printed text. The electronic translation of spoken language may be erroneous, or at times, nonsensical words or phrases may be inadvertently transcribed.  Although I have reviewed the note for sucherrors, some may still exist.

## 2022-10-18 NOTE — PROGRESS NOTES
Medicare Annual Wellness Visit    Maria L Curran is here for Medicare AW   Recommendations for Preventive Services Due: see orders and patient instructions/AVS.  Recommended screening schedule for the next 5-10 years is provided to the patient in written form: see Patient Instructions/AVS.     Return for Medicare Annual Wellness Visit in 1 year. Subjective Patient's complete Health Risk Assessment and screening values have been reviewed and are found in Flowsheets. The following problems were reviewed today and where indicated follow up appointments were made and/or referrals ordered. Positive Risk Factor Screenings with Interventions:       Tobacco Use:  Tobacco Use: High Risk    Smoking Tobacco Use: Some Days    Smokeless Tobacco Use: Never     E-cigarette/Vaping       Questions Responses    E-cigarette/Vaping Use Never User    Start Date     Passive Exposure     Quit Date     Counseling Given     Comments           Substance Use - Tobacco Interventions:  Smoking cessation discussed with patient. Reference material given. Additional counseling and prescription medication help was offered to patient.            General Health and ACP:  General  In general, how would you say your health is?: Good  In the past 7 days, have you experienced any of the following: New or Increased Pain, New or Increased Fatigue, Loneliness, Social Isolation, Stress or Anger?: No  Do you get the social and emotional support that you need?: Yes  Do you have a Living Will?: (!) No    Advance Directives       Power of  Living Will ACP-Advance Directive ACP-Power of     Not on File Not on File Not on File Not on File          General Health Risk Interventions:  Health Habits/Nutrition:  Physical Activity: Insufficiently Active    Days of Exercise per Week: 2 days    Minutes of Exercise per Session: 30 min     Have you lost any weight without trying in the past 3 months?: No  Body mass index: (!) 31.24  Have you seen the dentist within the past year?: Yes    Health Habits/Nutrition Interventions  Healthy, mostly fiber rich nonstarchy plant-based diet recommended  Recommend to decrease intake of processed foods, simple carbohydrates and animal-based products that high in saturated fats  :  Hearing/Vision:  Do you or your family notice any trouble with your hearing that hasn't been managed with hearing aids?: (!) Yes  Do you have difficulty driving, watching TV, or doing any of your daily activities because of your eyesight?: No  Have you had an eye exam within the past year?: Yes  No results found. Hearing/Vision Interventions:  Hearing concerns:  patient declines any further evaluation/treatment for hearing issues            Objective   Vitals:    10/18/22 0914   BP: 122/68   Site: Left Upper Arm   Position: Sitting   Cuff Size: Large Adult   Pulse: 74   Resp: 18   SpO2: 98%   Weight: 224 lb (101.6 kg)   Height: 5' 11\" (1.803 m)      Body mass index is 31.24 kg/m². No Known Allergies  Prior to Visit Medications    Medication Sig Taking? Authorizing Provider   rosuvastatin (CRESTOR) 20 MG tablet  Yes Historical Provider, MD   rivaroxaban (XARELTO) 20 MG TABS tablet Take 1 tablet by mouth daily Yes Jc Durand MD   amLODIPine-benazepril (LOTREL) 10-20 MG per capsule Nisha Munguia Yes Yuan Solano MD       [facility] (Including outside providers/suppliers regularly involved in providing care):   Patient Care Team:  Yuan Solano MD as PCP - General (Internal Medicine)  Yuan Solano MD as PCP - [facility] Empaneled Provider  Lidia Kocher, APRN as Advanced Practice Nurse (Neurology)     Reviewed and updated this visit:  Tobacco  Allergies  Meds  Med Hx  Surg Hx  Soc Hx  Fam Hx          MEDICARE WELLNESS SCREENING/ MAINTENANCE:  1:PSA/ PROSTATE CANCER SCREEN-   Fu dr Román Lindo  2. SCREENING CSCOPE 6/18  3. CARDIOVASCULAR SCREENING- LIPID PANEL DONE  4. DIABETES SCREEN DONE - FBS =ABOVE LABS  5. PNEUMONIA VACCINATION- as completed the series  6. INFLUENZA VACCINATION: TO BE DONE IN FALL- refuses  7. HEP B VACCINATION- PT DECLINES  8. HIV/ HEP SCREENING na  9. AAA SCREEN- Done     HEALTH PLAN:  1. HEALTHY DIET: Healthy heart diet, no added sugar since patient is borderline diabetic  2. EXERCISE- slow walks 40 minutes ×5 days per week as minimal  3. FALL RISK SCREEN REVIEWED- NO INCREASED FALL RISK ON EXAM     Patient Instructions   Personalized Preventive Plan for Doreen Parent - 10/18/2022  Medicare offers a range of preventive health benefits. Some of the tests and screenings are paid in full while other may be subject to a deductible, co-insurance, and/or copay. Some of these benefits include a comprehensive review of your medical history including lifestyle, illnesses that may run in your family, and various assessments and screenings as appropriate. After reviewing your medical record and screening and assessments performed today your provider may have ordered immunizations, labs, imaging, and/or referrals for you. A list of these orders (if applicable) as well as your Preventive Care list are included within your After Visit Summary for your review. Other Preventive Recommendations:    A preventive eye exam performed by an eye specialist is recommended every 1-2 years to screen for glaucoma; cataracts, macular degeneration, and other eye disorders. A preventive dental visit is recommended every 6 months. Try to get at least 150 minutes of exercise per week or 10,000 steps per day on a pedometer . Order or download the FREE \"Exercise & Physical Activity: Your Everyday Guide\" from [employer]. Call 2-896.721.1076 or search [employer] online. You need 7031-8612 mg of calcium and 0179-1023 IU of vitamin D per day.  It is possible to meet your calcium requirement with diet alone, but a vitamin D supplement is usually necessary to meet this goal.  When exposed to the sun, use a sunscreen that protects against both UVA and UVB radiation with an SPF of 30 or greater. Reapply every 2 to 3 hours or after sweating, drying off with a towel, or swimming. Always wear a seat belt when traveling in a car. Always wear a helmet when riding a bicycle or motorcycle.

## 2022-11-07 ENCOUNTER — TELEPHONE (OUTPATIENT)
Dept: UROLOGY | Age: 77
End: 2022-11-07

## 2022-11-07 ENCOUNTER — OFFICE VISIT (OUTPATIENT)
Dept: UROLOGY | Age: 77
End: 2022-11-07
Payer: MEDICARE

## 2022-11-07 VITALS — HEIGHT: 72 IN | BODY MASS INDEX: 30.15 KG/M2 | WEIGHT: 222.6 LBS | TEMPERATURE: 97.5 F

## 2022-11-07 DIAGNOSIS — R35.0 URINARY FREQUENCY: ICD-10-CM

## 2022-11-07 DIAGNOSIS — R30.0 DYSURIA: Primary | ICD-10-CM

## 2022-11-07 LAB
BACTERIA URINE, POC: ABNORMAL
BILIRUBIN URINE: 0 MG/DL
BLOOD, URINE: POSITIVE
CASTS URINE, POC: 0
CLARITY: ABNORMAL
COLOR: YELLOW
CRYSTALS URINE, POC: 0
EPI CELLS URINE, POC: 0
GLUCOSE URINE: ABNORMAL
KETONES, URINE: NEGATIVE
LEUKOCYTE EST, POC: ABNORMAL
NITRITE, URINE: POSITIVE
PH UA: 6 (ref 4.5–8)
PROTEIN UA: POSITIVE
RBC URINE, POC: ABNORMAL
SPECIFIC GRAVITY UA: 1.02 (ref 1–1.03)
UROBILINOGEN, URINE: NORMAL
WBC URINE, POC: ABNORMAL
YEAST URINE, POC: 0

## 2022-11-07 PROCEDURE — G8427 DOCREV CUR MEDS BY ELIG CLIN: HCPCS | Performed by: NURSE PRACTITIONER

## 2022-11-07 PROCEDURE — 1123F ACP DISCUSS/DSCN MKR DOCD: CPT | Performed by: NURSE PRACTITIONER

## 2022-11-07 PROCEDURE — 96372 THER/PROPH/DIAG INJ SC/IM: CPT | Performed by: NURSE PRACTITIONER

## 2022-11-07 PROCEDURE — G8484 FLU IMMUNIZE NO ADMIN: HCPCS | Performed by: NURSE PRACTITIONER

## 2022-11-07 PROCEDURE — 99213 OFFICE O/P EST LOW 20 MIN: CPT | Performed by: NURSE PRACTITIONER

## 2022-11-07 PROCEDURE — 81001 URINALYSIS AUTO W/SCOPE: CPT | Performed by: NURSE PRACTITIONER

## 2022-11-07 PROCEDURE — 51798 US URINE CAPACITY MEASURE: CPT | Performed by: NURSE PRACTITIONER

## 2022-11-07 PROCEDURE — G8417 CALC BMI ABV UP PARAM F/U: HCPCS | Performed by: NURSE PRACTITIONER

## 2022-11-07 PROCEDURE — 4004F PT TOBACCO SCREEN RCVD TLK: CPT | Performed by: NURSE PRACTITIONER

## 2022-11-07 RX ORDER — CEFTRIAXONE SODIUM 250 MG/1
250 INJECTION, POWDER, FOR SOLUTION INTRAMUSCULAR; INTRAVENOUS ONCE
Status: COMPLETED | OUTPATIENT
Start: 2022-11-07 | End: 2022-11-07

## 2022-11-07 RX ORDER — CEFDINIR 300 MG/1
300 CAPSULE ORAL 2 TIMES DAILY
Qty: 14 CAPSULE | Refills: 0 | Status: SHIPPED | OUTPATIENT
Start: 2022-11-07 | End: 2022-11-14

## 2022-11-07 RX ADMIN — CEFTRIAXONE SODIUM 250 MG: 250 INJECTION, POWDER, FOR SOLUTION INTRAMUSCULAR; INTRAVENOUS at 16:19

## 2022-11-07 NOTE — PROGRESS NOTES
After obtaining consent from patient and receiving verbal/written orders from SMILEY Scott, I gave patient 1g of Ceftriaxone injection in Left upper quad. gluteus, patient tolerated well. Medication was not supplied by the patient.

## 2022-11-07 NOTE — PROGRESS NOTES
Juancarlos Meyers is a 64 y.o. male who presents today   Chief Complaint   Patient presents with    Follow-up     I am here today for a possible UTI     Patient is 66-year-old male presents to clinic today for possible UTI. He has a history of resume water vapor therapy done on 8/31/2022. He developed a urinary tract infection shortly after catheter was removed and was hospitalized at [facility] on 9/16/2022 until 9/19/2022. Cultures grew E. coli she with IV Rocephin. He went to his PCPs office on 10/3/2022 and urine did appear to infected although patient was not treated for UTI nor was culture sent. He has had complaints of increased frequency and a fever of 103 over the last 2 days along with fatigue and chills. He reports he took oral antibiotics, ampicillin, that he had at home instead of going to urgent care. He is no longer on urologic medications. He continues to have an increase in frequency, fatigue, malaise. Denies any dysuria, hematuria.     Past Medical History:   Diagnosis Date    Arthritis     GERD (gastroesophageal reflux disease)     Hypertension     Lumbar compression fracture, sequela     1980    CAROLEE on CPAP     PAF (paroxysmal atrial fibrillation) ([address]     Stroke ([address] [id number]    minis stroke about 5 years ago as well, both on the left side    Stroke ([address]     10 years ago, weakness on left side       Past Surgical History:   Procedure Laterality Date    ABLATION OF DYSRHYTHMIC FOCUS  08/04/2021    IN [place] FOR TREATMENT OF AFIB    CHOLECYSTECTOMY      COLONOSCOPY  2008    [facility]    COLONOSCOPY N/A 06/25/2018    Dr Qi Calabrese AP (-) dysplasia x 4--6 month recall    COLONOSCOPY N/A 12/12/2018    Dr Qi Calabrese AP (-) dysplasia x 1, HP x 2--3 yr recall    HERNIA REPAIR      TOTAL KNEE ARTHROPLASTY Right     TOTAL KNEE ARTHROPLASTY Left     TURP N/A 8/31/2022    SOHANZUM RF WATER VAPOR THERAPY OF PROSTATE, CYSTOSCOPY performed by Suzan Cheek MD at [facility] GASTROINTESTINAL ENDOSCOPY  2008    [facility]       Current Outpatient Medications   Medication Sig Dispense Refill    cefdinir (OMNICEF) 300 MG capsule Take 1 capsule by mouth 2 times daily for 7 days 14 capsule 0    rosuvastatin (CRESTOR) 20 MG tablet       rivaroxaban (XARELTO) 20 MG TABS tablet Take 1 tablet by mouth daily 30 tablet 0    amLODIPine-benazepril (LOTREL) 10-20 MG per capsule TAKE ONE CAPSULE BY MOUTH DAILY 90 capsule 0     No current facility-administered medications for this visit. No Known Allergies    Social History     Socioeconomic History    Marital status:      Spouse name: None    Number of children: None    Years of education: None    Highest education level: None   Tobacco Use    Smoking status: Some Days     Packs/day: 0.50     Years: 20.00     Pack years: 10.00     Types: Cigars, Cigarettes     Last attempt to quit: 8/14/2019     Years since quitting: 3.2    Smokeless tobacco: Never    Tobacco comments:     Pt only smoked Cigars   Vaping Use    Vaping Use: Never used   Substance and Sexual Activity    Alcohol use: No    Drug use: No     Social Determinants of Health     Financial Resource Strain: Low Risk     Difficulty of Paying Living Expenses: Not hard at all   Food Insecurity: No Food Insecurity    Worried About Running Out of Food in the Last Year: Never true    Ran Out of Food in the Last Year: Never true   Physical Activity: Insufficiently Active    Days of Exercise per Week: 2 days    Minutes of Exercise per Session: 30 min       Family History   Problem Relation Age of Onset    Colon Cancer Neg Hx     Colon Polyps Neg Hx     Liver Cancer Neg Hx     Liver Disease Neg Hx     Esophageal Cancer Neg Hx     Stomach Cancer Neg Hx     Rectal Cancer Neg Hx        REVIEW OF SYSTEMS:  Review of Systems   Constitutional:  Positive for activity change, chills, fatigue and fever. Gastrointestinal:  Negative for abdominal distention, abdominal pain, nausea and vomiting. Genitourinary:  Negative for difficulty urinating, dysuria, flank pain, frequency, hematuria and urgency. Musculoskeletal:  Negative for back pain and gait problem. Psychiatric/Behavioral:  Negative for agitation and confusion. PHYSICAL EXAM:  Temp 97.5 °F (36.4 °C) (Temporal)   Ht 5' 11.5\" (1.816 m)   Wt 222 lb 9.6 oz (101 kg)   BMI 30.61 kg/m²   Physical Exam  Vitals and nursing note reviewed. Constitutional:       General: He is not in acute distress. Appearance: Normal appearance. He is not ill-appearing. Pulmonary:      Effort: Pulmonary effort is normal. No respiratory distress. Abdominal:      General: There is no distension. Tenderness: There is no abdominal tenderness. There is no right CVA tenderness or left CVA tenderness. Neurological:      Mental Status: He is alert and oriented to person, place, and time. Mental status is at baseline.    Psychiatric:         Mood and Affect: Mood normal.         Behavior: Behavior normal.       DATA:    Results for orders placed or performed in visit on 11/07/22   Culture, Urine    Specimen: Urine, clean catch   Result Value Ref Range    Urine Culture, Routine >100,000 CFU/ml (A)     Organism Escherichia coli (A)     Urine Culture, Routine (A)      Heavy growth  Multiple Resistant Drug Organism  CONTACT PRECAUTIONS INDICATED         Susceptibility    Escherichia coli - BACTERIAL SUSCEPTIBILITY PANEL BY HERMILO     ampicillin >=32 Resistant mcg/mL     ampicillin-sulbactam 16 Intermediate mcg/mL     aztreonam <=1 Sensitive mcg/mL     ceFAZolin <=4 Sensitive mcg/mL     cefepime <=1 Sensitive mcg/mL     cefTRIAXone <=1 Sensitive mcg/mL     ertapenem <=0.5 Sensitive mcg/mL     gentamicin >=16 Resistant mcg/mL     levofloxacin >=8 Resistant mcg/mL     meropenem <=0.25 Sensitive mcg/mL     nitrofurantoin <=16 Sensitive mcg/mL     piperacillin-tazobactam <=4 Sensitive mcg/mL     tobramycin 8 Intermediate mcg/mL     trimethoprim-sulfamethoxazole >=320 Resistant mcg/mL   POCT Urinalysis Dipstick w/ Micro (Auto)   Result Value Ref Range    Color, UA Yellow     Clarity, UA Cloudy (A) Clear    Glucose, Ur NEG     Bilirubin Urine 0 mg/dL    Ketones, Urine Negative     Specific Gravity, UA 1.020 1.005 - 1.030    Blood, Urine Positive     pH, UA 6 4.5 - 8.0    Protein, UA Positive (A) Negative    Nitrite, Urine Positive     Leukocytes, UA LARGE     Urobilinogen, Urine Normal     RBC Urine, POC 5-10     WBC Urine, POC TNTC     Bacteria Urine, POC 4+     yeast urine, poc 0     Casts Urine, POC 0     Epi Cells Urine, POC 0     crystals urine, poc 0      Lab Results   Component Value Date    PSA 5.5 (H) 06/28/2022    PSA 8.57 (H) 04/14/2022    PSA 4.49 (H) 10/11/2021    PSA 5.46 (H) 04/12/2021    PSA 5.93 (H) 10/09/2020     Lab Results   Component Value Date    PSAFREEPCT 20 06/28/2022       IMAGING:  Bladder Scan interpretation  Estimation of residual urine via abdominal ultrasound  Residual Urine: 43 ml  Indication: Frequency  Position: Supine  Examination: Incremental scanning of the suprapubic area using 3 MHz transducer using copious amounts of acoustic gel. Findings: An anechoic area was demonstrated which represented the bladder, with measurement of residual urine as noted. 1. Dysuria  Complaints of fever, overall fatigue, malaise, increased urinary frequency. Urine appeared infected today. We will go ahead and send for culture and treat empirically. We will go ahead and give him 1 g of Rocephin IM today and start cefdinir tomorrow. We will follow-up on urine cultures. - Culture, Urine  - [place] Measure, post-void residual, US, non-imaging  - cefdinir (OMNICEF) 300 MG capsule; Take 1 capsule by mouth 2 times daily for 7 days  Dispense: 14 capsule; Refill: 0  - POCT Urinalysis Dipstick w/ Micro (Auto)    2.  Urinary frequency  See above    Orders Placed This Encounter   Procedures    Culture, Urine     Order Specific Question:   Specify (ex-cath, midstream, cysto, etc)? Answer:   clean catch    POCT Urinalysis Dipstick w/ Micro (Auto)    AR Measure, post-void residual, US, non-imaging        Return for keep f/u with Abdirahman Posey . All information inputted into the note by the MA to include chief complaint, past medical history, past surgical history, medications, allergies, social and family history and review of systems has been reviewed and updated as needed by me. EMR Dragon/transcription disclaimer: Much of this documentt is electronic  transcription/translation of spoken language to printed text. The  electronic translation of spoken language may be erroneous, or at times,  nonsensical words or phrases may be inadvertently transcribed.  Although I  have reviewed the document for such errors, some may still exist.

## 2022-11-07 NOTE — TELEPHONE ENCOUNTER
Pt left vm stating he's been having recurrent uti's , symptoms started on Friday and he is wanting to be seen. Please advise.

## 2022-11-07 NOTE — TELEPHONE ENCOUNTER
Per Toñito Ross due to pt having recurrent infections recently please add pt on her schedule today be seen for possible uti.

## 2022-11-09 LAB
ORGANISM: ABNORMAL
URINE CULTURE, ROUTINE: ABNORMAL
URINE CULTURE, ROUTINE: ABNORMAL

## 2022-11-13 ASSESSMENT — ENCOUNTER SYMPTOMS
VOMITING: 0
BACK PAIN: 0
ABDOMINAL PAIN: 0
ABDOMINAL DISTENTION: 0
NAUSEA: 0

## 2022-11-15 DIAGNOSIS — R30.0 DYSURIA: Primary | ICD-10-CM

## 2022-11-15 RX ORDER — CEFDINIR 300 MG/1
300 CAPSULE ORAL 2 TIMES DAILY
Qty: 10 CAPSULE | Refills: 0 | Status: SHIPPED | OUTPATIENT
Start: 2022-11-15 | End: 2022-11-20

## 2022-12-14 DIAGNOSIS — R91.1 LEFT UPPER LOBE PULMONARY NODULE: ICD-10-CM

## 2022-12-19 DIAGNOSIS — R91.1 LEFT UPPER LOBE PULMONARY NODULE: Primary | ICD-10-CM

## 2023-01-05 ENCOUNTER — TELEPHONE (OUTPATIENT)
Dept: UROLOGY | Age: 78
End: 2023-01-05

## 2023-01-05 NOTE — TELEPHONE ENCOUNTER
Left message for patient to call the office back. Called the patient to see what time he could come in tomorrow for the in and out cath the provider recommends since it is so late in the day today.

## 2023-01-05 NOTE — TELEPHONE ENCOUNTER
Patient called stating he believes he has a UTI again. Patient wanted to see if he could get an antibiotic sent to the pharmacy or come in and be seen today. Please Advise.

## 2023-01-06 NOTE — TELEPHONE ENCOUNTER
Called patient back. He reports he feels better and does not need to come in today. [Annual Wellness Visit] : an annual wellness visit

## 2023-01-23 RX ORDER — AMLODIPINE BESYLATE AND BENAZEPRIL HYDROCHLORIDE 10; 20 MG/1; MG/1
CAPSULE ORAL
Qty: 90 CAPSULE | Refills: 1 | Status: SHIPPED | OUTPATIENT
Start: 2023-01-23

## 2023-01-23 NOTE — TELEPHONE ENCOUNTER
Robert Pearson called requesting a refill of the below medication which has been pended for you:     Requested Prescriptions     Pending Prescriptions Disp Refills    amLODIPine-benazepril (LOTREL) 10-20 MG per capsule [Pharmacy Med Name: AMLODIPINE-BENAZEPRIL 10-20 10-20 Capsule] 90 capsule 1     Sig: TAKE ONE CAPSULE BY MOUTH DAILY       Last Appointment Date: 10/18/2022  Next Appointment Date: 4/18/2023    No Known Allergies

## 2023-04-18 ENCOUNTER — OFFICE VISIT (OUTPATIENT)
Dept: INTERNAL MEDICINE | Age: 78
End: 2023-04-18
Payer: MEDICARE

## 2023-04-18 VITALS
OXYGEN SATURATION: 97 % | DIASTOLIC BLOOD PRESSURE: 70 MMHG | RESPIRATION RATE: 18 BRPM | BODY MASS INDEX: 31.22 KG/M2 | SYSTOLIC BLOOD PRESSURE: 122 MMHG | HEART RATE: 82 BPM | WEIGHT: 223 LBS | HEIGHT: 71 IN

## 2023-04-18 DIAGNOSIS — Z99.89 OSA ON CPAP: ICD-10-CM

## 2023-04-18 DIAGNOSIS — G47.33 OSA ON CPAP: ICD-10-CM

## 2023-04-18 DIAGNOSIS — R73.01 IFG (IMPAIRED FASTING GLUCOSE): ICD-10-CM

## 2023-04-18 DIAGNOSIS — R97.20 ELEVATED PSA: ICD-10-CM

## 2023-04-18 DIAGNOSIS — G81.94 LEFT HEMIPARESIS (HCC): ICD-10-CM

## 2023-04-18 DIAGNOSIS — I48.0 PAF (PAROXYSMAL ATRIAL FIBRILLATION) (HCC): ICD-10-CM

## 2023-04-18 DIAGNOSIS — I10 ESSENTIAL HYPERTENSION: ICD-10-CM

## 2023-04-18 DIAGNOSIS — E04.1 THYROID NODULE: ICD-10-CM

## 2023-04-18 DIAGNOSIS — E78.00 PURE HYPERCHOLESTEROLEMIA: Primary | ICD-10-CM

## 2023-04-18 DIAGNOSIS — I77.89 AORTIC ROOT ENLARGEMENT (HCC): ICD-10-CM

## 2023-04-18 PROCEDURE — G8417 CALC BMI ABV UP PARAM F/U: HCPCS | Performed by: INTERNAL MEDICINE

## 2023-04-18 PROCEDURE — 4004F PT TOBACCO SCREEN RCVD TLK: CPT | Performed by: INTERNAL MEDICINE

## 2023-04-18 PROCEDURE — G8427 DOCREV CUR MEDS BY ELIG CLIN: HCPCS | Performed by: INTERNAL MEDICINE

## 2023-04-18 PROCEDURE — 3074F SYST BP LT 130 MM HG: CPT | Performed by: INTERNAL MEDICINE

## 2023-04-18 PROCEDURE — 99214 OFFICE O/P EST MOD 30 MIN: CPT | Performed by: INTERNAL MEDICINE

## 2023-04-18 PROCEDURE — 3078F DIAST BP <80 MM HG: CPT | Performed by: INTERNAL MEDICINE

## 2023-04-18 PROCEDURE — 1123F ACP DISCUSS/DSCN MKR DOCD: CPT | Performed by: INTERNAL MEDICINE

## 2023-04-18 RX ORDER — ZINC GLUCONATE 50 MG
50 TABLET ORAL DAILY
COMMUNITY

## 2023-04-18 ASSESSMENT — PATIENT HEALTH QUESTIONNAIRE - PHQ9
2. FEELING DOWN, DEPRESSED OR HOPELESS: 0
SUM OF ALL RESPONSES TO PHQ QUESTIONS 1-9: 0
SUM OF ALL RESPONSES TO PHQ QUESTIONS 1-9: 0
1. LITTLE INTEREST OR PLEASURE IN DOING THINGS: 0
SUM OF ALL RESPONSES TO PHQ9 QUESTIONS 1 & 2: 0
SUM OF ALL RESPONSES TO PHQ QUESTIONS 1-9: 0
SUM OF ALL RESPONSES TO PHQ QUESTIONS 1-9: 0

## 2023-04-18 ASSESSMENT — ENCOUNTER SYMPTOMS
ABDOMINAL PAIN: 0
SORE THROAT: 0
WHEEZING: 0
COUGH: 0
CONSTIPATION: 0
CHEST TIGHTNESS: 0

## 2023-04-18 NOTE — PROGRESS NOTES
RX crestor 20 mg daily  Healthy, mostly fiber rich nonstarchy plant-based diet recommended  Recommend to decrease intake of processed foods, simple carbohydrates and animal-based products that high in saturated fats        IFG (impaired fasting glucose)   Lab results dw pt in detail  a1c better 5.6  Was higher at 5.9 in 10/2022  diet and weight loss recommended     Exogenous obesity  Pt was given counseling about diet and exercise including education on what calories are, where calories come from, the need for portion control,following lower carbohydrate dietary regimen and healthy snacks along side an active lifestyle with supplementary exercise of approx 30 minutes a week, 5 days a week of exercise for weight loss. The patient voiced increased understanding of the topics discussed. He has history of PAF  Follows with Dr. Michele Young for PAF and history of cryptogenic stroke  Has recently seen Dr. Michele Young  He is wearing loop recorder since about a year ago  His echocardiogram that was done March 2020  Result Narrative   · No evidence of pulmonary hypertension is present. · Estimated EF = 65%. · Left ventricular systolic function is normal.  · Left ventricular diastolic dysfunction. · Mild dilation of the aortic root is present. Aortic root dilatation  at approximately 42 mm in maximal dimension in the ascending aorta  Stable on fu 12/2022  As per dr Kelvin Ugarte hypertension  Blood pressure readings reviewed  he will continue amlodipine 10, benazepril 20            CAROLEE on CPAP  Not doing well since equipment is not working  Frequently at night his CPAP just stops working  Pt needs new equipement  Pt does benefit from CPAP  Orders sent to [facility]  Cont current settings     Post CVA  Residual mild LEFT LE weakness  Stable  Now on statin     Thyroid nodule per CT:  Ultrasound stable nodule 3/2023  1. Dominant nodule within the right lobe which is been previously   biopsied.  This reportedly

## 2023-04-25 RX ORDER — AMLODIPINE BESYLATE AND BENAZEPRIL HYDROCHLORIDE 10; 20 MG/1; MG/1
CAPSULE ORAL
Qty: 90 CAPSULE | Refills: 1 | Status: SHIPPED | OUTPATIENT
Start: 2023-04-25

## 2023-04-25 NOTE — TELEPHONE ENCOUNTER
Sid Davsi called requesting a refill of the below medication which has been pended for you:     Requested Prescriptions     Pending Prescriptions Disp Refills    amLODIPine-benazepril (LOTREL) 10-20 MG per capsule [Pharmacy Med Name: AMLODIPINE-BENAZEPRIL 10-20 10-20 Capsule] 90 capsule 1     Sig: TAKE ONE CAPSULE BY MOUTH DAILY       Last Appointment Date: 4/18/2023  Next Appointment Date: 10/18/2023    No Known Allergies

## 2023-10-10 DIAGNOSIS — R97.20 ELEVATED PSA: ICD-10-CM

## 2023-10-10 DIAGNOSIS — G47.33 OSA ON CPAP: ICD-10-CM

## 2023-10-10 DIAGNOSIS — R73.01 IFG (IMPAIRED FASTING GLUCOSE): ICD-10-CM

## 2023-10-10 DIAGNOSIS — E78.00 PURE HYPERCHOLESTEROLEMIA: ICD-10-CM

## 2023-10-10 DIAGNOSIS — I10 ESSENTIAL HYPERTENSION: ICD-10-CM

## 2023-10-10 DIAGNOSIS — E04.1 THYROID NODULE: ICD-10-CM

## 2023-10-10 DIAGNOSIS — I77.89 AORTIC ROOT ENLARGEMENT (HCC): ICD-10-CM

## 2023-10-10 DIAGNOSIS — I48.0 PAF (PAROXYSMAL ATRIAL FIBRILLATION) (HCC): ICD-10-CM

## 2023-10-10 DIAGNOSIS — G81.94 LEFT HEMIPARESIS (HCC): ICD-10-CM

## 2023-10-10 LAB
ALBUMIN SERPL-MCNC: 4.3 G/DL (ref 3.5–5.2)
ALP SERPL-CCNC: 71 U/L (ref 40–130)
ALT SERPL-CCNC: 11 U/L (ref 5–41)
ANION GAP SERPL CALCULATED.3IONS-SCNC: 13 MMOL/L (ref 7–19)
AST SERPL-CCNC: 13 U/L (ref 5–40)
BACTERIA URNS QL MICRO: NEGATIVE /HPF
BASOPHILS # BLD: 0.1 K/UL (ref 0–0.2)
BASOPHILS NFR BLD: 1.2 % (ref 0–1)
BILIRUB SERPL-MCNC: 0.4 MG/DL (ref 0.2–1.2)
BILIRUB UR QL STRIP: NEGATIVE
BUN SERPL-MCNC: 26 MG/DL (ref 8–23)
CALCIUM SERPL-MCNC: 9.4 MG/DL (ref 8.8–10.2)
CHLORIDE SERPL-SCNC: 103 MMOL/L (ref 98–111)
CHOLEST SERPL-MCNC: 106 MG/DL (ref 160–199)
CLARITY UR: CLEAR
CO2 SERPL-SCNC: 24 MMOL/L (ref 22–29)
COLOR UR: YELLOW
CREAT SERPL-MCNC: 1.1 MG/DL (ref 0.5–1.2)
CRYSTALS URNS MICRO: ABNORMAL /HPF
EOSINOPHIL # BLD: 0.1 K/UL (ref 0–0.6)
EOSINOPHIL NFR BLD: 2.9 % (ref 0–5)
EPI CELLS #/AREA URNS AUTO: 0 /HPF (ref 0–5)
ERYTHROCYTE [DISTWIDTH] IN BLOOD BY AUTOMATED COUNT: 13 % (ref 11.5–14.5)
GLUCOSE SERPL-MCNC: 98 MG/DL (ref 74–109)
GLUCOSE UR STRIP.AUTO-MCNC: NEGATIVE MG/DL
HBA1C MFR BLD: 5.9 % (ref 4–6)
HCT VFR BLD AUTO: 43.5 % (ref 42–52)
HDLC SERPL-MCNC: 39 MG/DL (ref 55–121)
HGB BLD-MCNC: 13.5 G/DL (ref 14–18)
HGB UR STRIP.AUTO-MCNC: ABNORMAL MG/L
HYALINE CASTS #/AREA URNS AUTO: 3 /HPF (ref 0–8)
IMM GRANULOCYTES # BLD: 0 K/UL
KETONES UR STRIP.AUTO-MCNC: NEGATIVE MG/DL
LDLC SERPL CALC-MCNC: 50 MG/DL
LEUKOCYTE ESTERASE UR QL STRIP.AUTO: NEGATIVE
LYMPHOCYTES # BLD: 0.9 K/UL (ref 1.1–4.5)
LYMPHOCYTES NFR BLD: 22.6 % (ref 20–40)
MCH RBC QN AUTO: 29.6 PG (ref 27–31)
MCHC RBC AUTO-ENTMCNC: 31 G/DL (ref 33–37)
MCV RBC AUTO: 95.4 FL (ref 80–94)
MONOCYTES # BLD: 0.6 K/UL (ref 0–0.9)
MONOCYTES NFR BLD: 14.7 % (ref 0–10)
NEUTROPHILS # BLD: 2.4 K/UL (ref 1.5–7.5)
NEUTS SEG NFR BLD: 58.4 % (ref 50–65)
NITRITE UR QL STRIP.AUTO: NEGATIVE
PH UR STRIP.AUTO: 5.5 [PH] (ref 5–8)
PLATELET # BLD AUTO: 157 K/UL (ref 130–400)
PMV BLD AUTO: 10 FL (ref 9.4–12.4)
POTASSIUM SERPL-SCNC: 4.2 MMOL/L (ref 3.5–5)
PROT SERPL-MCNC: 6.8 G/DL (ref 6.6–8.7)
PROT UR STRIP.AUTO-MCNC: NEGATIVE MG/DL
PSA SERPL-MCNC: 4.23 NG/ML (ref 0–4)
RBC # BLD AUTO: 4.56 M/UL (ref 4.7–6.1)
RBC #/AREA URNS AUTO: 1 /HPF (ref 0–4)
SODIUM SERPL-SCNC: 140 MMOL/L (ref 136–145)
SP GR UR STRIP.AUTO: 1.01 (ref 1–1.03)
TRIGL SERPL-MCNC: 87 MG/DL (ref 0–149)
TSH SERPL DL<=0.005 MIU/L-ACNC: 0.66 UIU/ML (ref 0.27–4.2)
UROBILINOGEN UR STRIP.AUTO-MCNC: 0.2 E.U./DL
WBC # BLD AUTO: 4.2 K/UL (ref 4.8–10.8)
WBC #/AREA URNS AUTO: 1 /HPF (ref 0–5)

## 2023-10-16 SDOH — ECONOMIC STABILITY: INCOME INSECURITY: HOW HARD IS IT FOR YOU TO PAY FOR THE VERY BASICS LIKE FOOD, HOUSING, MEDICAL CARE, AND HEATING?: NOT HARD AT ALL

## 2023-10-16 SDOH — ECONOMIC STABILITY: FOOD INSECURITY: WITHIN THE PAST 12 MONTHS, THE FOOD YOU BOUGHT JUST DIDN'T LAST AND YOU DIDN'T HAVE MONEY TO GET MORE.: NEVER TRUE

## 2023-10-16 SDOH — ECONOMIC STABILITY: FOOD INSECURITY: WITHIN THE PAST 12 MONTHS, YOU WORRIED THAT YOUR FOOD WOULD RUN OUT BEFORE YOU GOT MONEY TO BUY MORE.: NEVER TRUE

## 2023-10-16 SDOH — ECONOMIC STABILITY: TRANSPORTATION INSECURITY
IN THE PAST 12 MONTHS, HAS LACK OF TRANSPORTATION KEPT YOU FROM MEETINGS, WORK, OR FROM GETTING THINGS NEEDED FOR DAILY LIVING?: NO

## 2023-10-16 SDOH — ECONOMIC STABILITY: HOUSING INSECURITY
IN THE LAST 12 MONTHS, WAS THERE A TIME WHEN YOU DID NOT HAVE A STEADY PLACE TO SLEEP OR SLEPT IN A SHELTER (INCLUDING NOW)?: NO

## 2023-10-16 ASSESSMENT — LIFESTYLE VARIABLES
HOW MANY STANDARD DRINKS CONTAINING ALCOHOL DO YOU HAVE ON A TYPICAL DAY: 0
HOW OFTEN DO YOU HAVE A DRINK CONTAINING ALCOHOL: 1
HOW OFTEN DO YOU HAVE A DRINK CONTAINING ALCOHOL: NEVER
HOW OFTEN DO YOU HAVE SIX OR MORE DRINKS ON ONE OCCASION: 1
HOW MANY STANDARD DRINKS CONTAINING ALCOHOL DO YOU HAVE ON A TYPICAL DAY: PATIENT DOES NOT DRINK

## 2023-10-16 ASSESSMENT — PATIENT HEALTH QUESTIONNAIRE - PHQ9
SUM OF ALL RESPONSES TO PHQ QUESTIONS 1-9: 0
SUM OF ALL RESPONSES TO PHQ QUESTIONS 1-9: 0
1. LITTLE INTEREST OR PLEASURE IN DOING THINGS: 0
2. FEELING DOWN, DEPRESSED OR HOPELESS: 0
SUM OF ALL RESPONSES TO PHQ QUESTIONS 1-9: 0
SUM OF ALL RESPONSES TO PHQ9 QUESTIONS 1 & 2: 0
SUM OF ALL RESPONSES TO PHQ QUESTIONS 1-9: 0

## 2023-10-18 ENCOUNTER — OFFICE VISIT (OUTPATIENT)
Dept: INTERNAL MEDICINE | Age: 78
End: 2023-10-18
Payer: MEDICARE

## 2023-10-18 VITALS
HEART RATE: 73 BPM | BODY MASS INDEX: 31.64 KG/M2 | HEIGHT: 71 IN | DIASTOLIC BLOOD PRESSURE: 64 MMHG | OXYGEN SATURATION: 97 % | WEIGHT: 225.97 LBS | SYSTOLIC BLOOD PRESSURE: 122 MMHG

## 2023-10-18 DIAGNOSIS — D68.69 SECONDARY HYPERCOAGULABLE STATE (HCC): ICD-10-CM

## 2023-10-18 DIAGNOSIS — I10 ESSENTIAL HYPERTENSION: ICD-10-CM

## 2023-10-18 DIAGNOSIS — Z78.9 STATIN INTOLERANCE: ICD-10-CM

## 2023-10-18 DIAGNOSIS — R91.1 LEFT UPPER LOBE PULMONARY NODULE: ICD-10-CM

## 2023-10-18 DIAGNOSIS — Z00.00 MEDICARE ANNUAL WELLNESS VISIT, SUBSEQUENT: Primary | ICD-10-CM

## 2023-10-18 DIAGNOSIS — E04.1 THYROID NODULE: ICD-10-CM

## 2023-10-18 DIAGNOSIS — G47.33 OSA ON CPAP: ICD-10-CM

## 2023-10-18 DIAGNOSIS — Z86.73 CEREBRAL ARTERIOSCLEROSIS WITH HISTORY OF PREVIOUS STROKE: ICD-10-CM

## 2023-10-18 DIAGNOSIS — E78.00 PURE HYPERCHOLESTEROLEMIA: ICD-10-CM

## 2023-10-18 DIAGNOSIS — I67.2 CEREBRAL ARTERIOSCLEROSIS WITH HISTORY OF PREVIOUS STROKE: ICD-10-CM

## 2023-10-18 DIAGNOSIS — I48.0 PAF (PAROXYSMAL ATRIAL FIBRILLATION) (HCC): ICD-10-CM

## 2023-10-18 DIAGNOSIS — R73.01 IFG (IMPAIRED FASTING GLUCOSE): ICD-10-CM

## 2023-10-18 PROCEDURE — G8427 DOCREV CUR MEDS BY ELIG CLIN: HCPCS | Performed by: INTERNAL MEDICINE

## 2023-10-18 PROCEDURE — G8417 CALC BMI ABV UP PARAM F/U: HCPCS | Performed by: INTERNAL MEDICINE

## 2023-10-18 PROCEDURE — G0439 PPPS, SUBSEQ VISIT: HCPCS | Performed by: INTERNAL MEDICINE

## 2023-10-18 PROCEDURE — 4004F PT TOBACCO SCREEN RCVD TLK: CPT | Performed by: INTERNAL MEDICINE

## 2023-10-18 PROCEDURE — 3078F DIAST BP <80 MM HG: CPT | Performed by: INTERNAL MEDICINE

## 2023-10-18 PROCEDURE — 99214 OFFICE O/P EST MOD 30 MIN: CPT | Performed by: INTERNAL MEDICINE

## 2023-10-18 PROCEDURE — 1123F ACP DISCUSS/DSCN MKR DOCD: CPT | Performed by: INTERNAL MEDICINE

## 2023-10-18 PROCEDURE — 3074F SYST BP LT 130 MM HG: CPT | Performed by: INTERNAL MEDICINE

## 2023-10-18 PROCEDURE — G8484 FLU IMMUNIZE NO ADMIN: HCPCS | Performed by: INTERNAL MEDICINE

## 2023-10-18 ASSESSMENT — PATIENT HEALTH QUESTIONNAIRE - PHQ9
1. LITTLE INTEREST OR PLEASURE IN DOING THINGS: 0
SUM OF ALL RESPONSES TO PHQ QUESTIONS 1-9: 0
SUM OF ALL RESPONSES TO PHQ QUESTIONS 1-9: 0
SUM OF ALL RESPONSES TO PHQ9 QUESTIONS 1 & 2: 0
SUM OF ALL RESPONSES TO PHQ QUESTIONS 1-9: 0
SUM OF ALL RESPONSES TO PHQ QUESTIONS 1-9: 0
2. FEELING DOWN, DEPRESSED OR HOPELESS: 0

## 2023-10-18 ASSESSMENT — ENCOUNTER SYMPTOMS
CONSTIPATION: 0
CHEST TIGHTNESS: 0
COUGH: 0
SORE THROAT: 0
ABDOMINAL PAIN: 0
WHEEZING: 0

## 2023-10-18 NOTE — PATIENT INSTRUCTIONS
The [employer] on Aging online. You need 7888-1448 mg of calcium and 0487-2633 IU of vitamin D per day. It is possible to meet your calcium requirement with diet alone, but a vitamin D supplement is usually necessary to meet this goal.  When exposed to the sun, use a sunscreen that protects against both UVA and UVB radiation with an SPF of 30 or greater. Reapply every 2 to 3 hours or after sweating, drying off with a towel, or swimming. Always wear a seat belt when traveling in a car. Always wear a helmet when riding a bicycle or motorcycle.

## 2023-10-24 RX ORDER — AMLODIPINE BESYLATE AND BENAZEPRIL HYDROCHLORIDE 10; 20 MG/1; MG/1
CAPSULE ORAL
Qty: 90 CAPSULE | Refills: 1 | Status: SHIPPED | OUTPATIENT
Start: 2023-10-24

## 2023-10-24 NOTE — TELEPHONE ENCOUNTER
Cody Alexander called to request a refill on his medication.       Last office visit : 10/18/2023   Next office visit : 4/26/2024    Requested Prescriptions     Pending Prescriptions Disp Refills    amLODIPine-benazepril (LOTREL) 10-20 MG per capsule [Pharmacy Med Name: AMLODIPINE-BENAZEPRIL 10-20 10-20 Capsule] 90 capsule 1     Sig: TAKE ONE [address] [place] [address]

## 2023-12-06 ENCOUNTER — HOSPITAL ENCOUNTER (OUTPATIENT)
Dept: GENERAL RADIOLOGY | Age: 78
Discharge: HOME | End: 2023-12-06
Attending: INTERNAL MEDICINE
Payer: MEDICARE

## 2023-12-06 DIAGNOSIS — R91.1 LEFT UPPER LOBE PULMONARY NODULE: ICD-10-CM

## 2023-12-06 DIAGNOSIS — R91.1 LEFT UPPER LOBE PULMONARY NODULE: Primary | ICD-10-CM

## 2023-12-06 DIAGNOSIS — I77.89 AORTIC ROOT ENLARGEMENT (HCC): ICD-10-CM

## 2023-12-06 DIAGNOSIS — E04.1 THYROID NODULE: ICD-10-CM

## 2023-12-06 PROCEDURE — 71260 CT THORAX DX C+: CPT

## 2023-12-06 PROCEDURE — 6360000004 HC RX CONTRAST MEDICATION: Performed by: INTERNAL MEDICINE

## 2023-12-06 RX ADMIN — IOPAMIDOL 75 ML: 755 INJECTION, SOLUTION INTRAVENOUS at 11:30

## 2024-01-04 DIAGNOSIS — I48.0 PAF (PAROXYSMAL ATRIAL FIBRILLATION) (HCC): Primary | ICD-10-CM

## 2024-01-04 DIAGNOSIS — I10 ESSENTIAL HYPERTENSION: ICD-10-CM

## 2024-01-08 ENCOUNTER — TELEPHONE (OUTPATIENT)
Dept: CARDIOLOGY CLINIC | Age: 79
End: 2024-01-08

## 2024-01-08 NOTE — TELEPHONE ENCOUNTER
Patient called in per preservice and was needing to get in touch with me, LVM with patient to return call.

## 2024-01-09 ENCOUNTER — HOSPITAL ENCOUNTER (OUTPATIENT)
Age: 79
Setting detail: OBSERVATION
Discharge: HOME | End: 2024-01-10
Attending: EMERGENCY MEDICINE | Admitting: INTERNAL MEDICINE
Payer: MEDICARE

## 2024-01-09 ENCOUNTER — APPOINTMENT (OUTPATIENT)
Dept: GENERAL RADIOLOGY | Age: 79
End: 2024-01-09
Payer: MEDICARE

## 2024-01-09 DIAGNOSIS — R07.9 ACUTE CHEST PAIN: Primary | ICD-10-CM

## 2024-01-09 LAB
ALBUMIN SERPL-MCNC: 4.2 G/DL (ref 3.5–5.2)
ALP SERPL-CCNC: 66 U/L (ref 40–130)
ALT SERPL-CCNC: 7 U/L (ref 5–41)
ANION GAP SERPL CALCULATED.3IONS-SCNC: 9 MMOL/L (ref 7–19)
AST SERPL-CCNC: 15 U/L (ref 5–40)
BASOPHILS # BLD: 0.1 K/UL (ref 0–0.2)
BASOPHILS NFR BLD: 0.9 % (ref 0–1)
BILIRUB SERPL-MCNC: 0.3 MG/DL (ref 0.2–1.2)
BUN SERPL-MCNC: 23 MG/DL (ref 8–23)
CALCIUM SERPL-MCNC: 9.4 MG/DL (ref 8.8–10.2)
CHLORIDE SERPL-SCNC: 106 MMOL/L (ref 98–111)
CO2 SERPL-SCNC: 28 MMOL/L (ref 22–29)
CREAT SERPL-MCNC: 1 MG/DL (ref 0.5–1.2)
EKG P AXIS: 70 DEGREES
EKG P-R INTERVAL: 210 MS
EKG Q-T INTERVAL: 412 MS
EKG QRS DURATION: 90 MS
EKG QTC CALCULATION (BAZETT): 417 MS
EKG T AXIS: 45 DEGREES
EOSINOPHIL # BLD: 0.2 K/UL (ref 0–0.6)
EOSINOPHIL NFR BLD: 4.1 % (ref 0–5)
ERYTHROCYTE [DISTWIDTH] IN BLOOD BY AUTOMATED COUNT: 12.8 % (ref 11.5–14.5)
GLUCOSE SERPL-MCNC: 127 MG/DL (ref 74–109)
HCT VFR BLD AUTO: 43.5 % (ref 42–52)
HGB BLD-MCNC: 13.9 G/DL (ref 14–18)
IMM GRANULOCYTES # BLD: 0 K/UL
LYMPHOCYTES # BLD: 1.1 K/UL (ref 1.1–4.5)
LYMPHOCYTES NFR BLD: 19.4 % (ref 20–40)
MCH RBC QN AUTO: 29.1 PG (ref 27–31)
MCHC RBC AUTO-ENTMCNC: 32 G/DL (ref 33–37)
MCV RBC AUTO: 91 FL (ref 80–94)
MONOCYTES # BLD: 0.6 K/UL (ref 0–0.9)
MONOCYTES NFR BLD: 10.4 % (ref 0–10)
NEUTROPHILS # BLD: 3.6 K/UL (ref 1.5–7.5)
NEUTS SEG NFR BLD: 64.8 % (ref 50–65)
PLATELET # BLD AUTO: 187 K/UL (ref 130–400)
PMV BLD AUTO: 9.2 FL (ref 9.4–12.4)
POTASSIUM SERPL-SCNC: 4.3 MMOL/L (ref 3.5–5)
PROT SERPL-MCNC: 7 G/DL (ref 6.6–8.7)
RBC # BLD AUTO: 4.78 M/UL (ref 4.7–6.1)
SODIUM SERPL-SCNC: 143 MMOL/L (ref 136–145)
TROPONIN, HIGH SENSITIVITY: 18 NG/L (ref 0–22)
TROPONIN, HIGH SENSITIVITY: 19 NG/L (ref 0–22)
WBC # BLD AUTO: 5.6 K/UL (ref 4.8–10.8)

## 2024-01-09 PROCEDURE — G0378 HOSPITAL OBSERVATION PER HR: HCPCS

## 2024-01-09 PROCEDURE — 6360000004 HC RX CONTRAST MEDICATION

## 2024-01-09 PROCEDURE — 36415 COLL VENOUS BLD VENIPUNCTURE: CPT

## 2024-01-09 PROCEDURE — 80053 COMPREHEN METABOLIC PANEL: CPT

## 2024-01-09 PROCEDURE — 99285 EMERGENCY DEPT VISIT HI MDM: CPT

## 2024-01-09 PROCEDURE — 6370000000 HC RX 637 (ALT 250 FOR IP)

## 2024-01-09 PROCEDURE — 85025 COMPLETE CBC W/AUTO DIFF WBC: CPT

## 2024-01-09 PROCEDURE — 84484 ASSAY OF TROPONIN QUANT: CPT

## 2024-01-09 PROCEDURE — C8929 TTE W OR WO FOL WCON,DOPPLER: HCPCS

## 2024-01-09 PROCEDURE — 93005 ELECTROCARDIOGRAM TRACING: CPT | Performed by: EMERGENCY MEDICINE

## 2024-01-09 PROCEDURE — 6370000000 HC RX 637 (ALT 250 FOR IP): Performed by: EMERGENCY MEDICINE

## 2024-01-09 PROCEDURE — 94660 CPAP INITIATION&MGMT: CPT

## 2024-01-09 PROCEDURE — 93010 ELECTROCARDIOGRAM REPORT: CPT | Performed by: INTERNAL MEDICINE

## 2024-01-09 PROCEDURE — 71045 X-RAY EXAM CHEST 1 VIEW: CPT

## 2024-01-09 RX ORDER — AMLODIPINE BESYLATE AND BENAZEPRIL HYDROCHLORIDE 10; 20 MG/1; MG/1
1 CAPSULE ORAL DAILY
Status: DISCONTINUED | OUTPATIENT
Start: 2024-01-09 | End: 2024-01-09 | Stop reason: CLARIF

## 2024-01-09 RX ORDER — ONDANSETRON 4 MG/1
4 TABLET, ORALLY DISINTEGRATING ORAL EVERY 8 HOURS PRN
Status: DISCONTINUED | OUTPATIENT
Start: 2024-01-09 | End: 2024-01-10 | Stop reason: HOSPADM

## 2024-01-09 RX ORDER — POLYETHYLENE GLYCOL 3350 17 G/17G
17 POWDER, FOR SOLUTION ORAL DAILY PRN
Status: DISCONTINUED | OUTPATIENT
Start: 2024-01-09 | End: 2024-01-10 | Stop reason: HOSPADM

## 2024-01-09 RX ORDER — ACETAMINOPHEN 325 MG/1
650 TABLET ORAL EVERY 6 HOURS PRN
Status: DISCONTINUED | OUTPATIENT
Start: 2024-01-09 | End: 2024-01-10 | Stop reason: HOSPADM

## 2024-01-09 RX ORDER — SODIUM CHLORIDE 0.9 % (FLUSH) 0.9 %
5-40 SYRINGE (ML) INJECTION PRN
Status: DISCONTINUED | OUTPATIENT
Start: 2024-01-09 | End: 2024-01-10 | Stop reason: HOSPADM

## 2024-01-09 RX ORDER — POTASSIUM CHLORIDE 7.45 MG/ML
10 INJECTION INTRAVENOUS PRN
Status: DISCONTINUED | OUTPATIENT
Start: 2024-01-09 | End: 2024-01-10 | Stop reason: HOSPADM

## 2024-01-09 RX ORDER — ACETAMINOPHEN 650 MG/1
650 SUPPOSITORY RECTAL EVERY 6 HOURS PRN
Status: DISCONTINUED | OUTPATIENT
Start: 2024-01-09 | End: 2024-01-10 | Stop reason: HOSPADM

## 2024-01-09 RX ORDER — AMLODIPINE BESYLATE 10 MG/1
10 TABLET ORAL DAILY
Status: DISCONTINUED | OUTPATIENT
Start: 2024-01-09 | End: 2024-01-10 | Stop reason: HOSPADM

## 2024-01-09 RX ORDER — ASPIRIN 325 MG
325 TABLET ORAL ONCE
Status: COMPLETED | OUTPATIENT
Start: 2024-01-09 | End: 2024-01-09

## 2024-01-09 RX ORDER — ONDANSETRON 2 MG/ML
4 INJECTION INTRAMUSCULAR; INTRAVENOUS EVERY 6 HOURS PRN
Status: DISCONTINUED | OUTPATIENT
Start: 2024-01-09 | End: 2024-01-10 | Stop reason: HOSPADM

## 2024-01-09 RX ORDER — POTASSIUM CHLORIDE 20 MEQ/1
40 TABLET, EXTENDED RELEASE ORAL PRN
Status: DISCONTINUED | OUTPATIENT
Start: 2024-01-09 | End: 2024-01-10 | Stop reason: HOSPADM

## 2024-01-09 RX ORDER — SODIUM CHLORIDE 0.9 % (FLUSH) 0.9 %
5-40 SYRINGE (ML) INJECTION EVERY 12 HOURS SCHEDULED
Status: DISCONTINUED | OUTPATIENT
Start: 2024-01-09 | End: 2024-01-10 | Stop reason: HOSPADM

## 2024-01-09 RX ORDER — SODIUM CHLORIDE 9 MG/ML
INJECTION, SOLUTION INTRAVENOUS PRN
Status: DISCONTINUED | OUTPATIENT
Start: 2024-01-09 | End: 2024-01-10 | Stop reason: HOSPADM

## 2024-01-09 RX ORDER — MAGNESIUM SULFATE IN WATER 40 MG/ML
2000 INJECTION, SOLUTION INTRAVENOUS PRN
Status: DISCONTINUED | OUTPATIENT
Start: 2024-01-09 | End: 2024-01-10 | Stop reason: HOSPADM

## 2024-01-09 RX ORDER — ATORVASTATIN CALCIUM 40 MG/1
40 TABLET, FILM COATED ORAL NIGHTLY
Status: DISCONTINUED | OUTPATIENT
Start: 2024-01-09 | End: 2024-01-10 | Stop reason: HOSPADM

## 2024-01-09 RX ORDER — LISINOPRIL 20 MG/1
20 TABLET ORAL DAILY
Status: DISCONTINUED | OUTPATIENT
Start: 2024-01-09 | End: 2024-01-10 | Stop reason: HOSPADM

## 2024-01-09 RX ORDER — ASPIRIN 81 MG/1
81 TABLET, CHEWABLE ORAL DAILY
Status: DISCONTINUED | OUTPATIENT
Start: 2024-01-10 | End: 2024-01-10 | Stop reason: HOSPADM

## 2024-01-09 RX ADMIN — AMLODIPINE BESYLATE 10 MG: 10 TABLET ORAL at 18:00

## 2024-01-09 RX ADMIN — LISINOPRIL 20 MG: 20 TABLET ORAL at 18:00

## 2024-01-09 RX ADMIN — ASPIRIN 325 MG: 325 TABLET ORAL at 12:40

## 2024-01-09 RX ADMIN — RIVAROXABAN 20 MG: 20 TABLET, FILM COATED ORAL at 18:00

## 2024-01-09 RX ADMIN — PERFLUTREN 1.5 ML: 6.52 INJECTION, SUSPENSION INTRAVENOUS at 15:28

## 2024-01-09 ASSESSMENT — PAIN SCALES - GENERAL: PAINLEVEL_OUTOF10: 4

## 2024-01-09 ASSESSMENT — ENCOUNTER SYMPTOMS
VOMITING: 0
ABDOMINAL PAIN: 0
SORE THROAT: 0
BACK PAIN: 0
SHORTNESS OF BREATH: 0
RHINORRHEA: 0
NAUSEA: 0
DIARRHEA: 0
COUGH: 0

## 2024-01-09 ASSESSMENT — HEART SCORE: ECG: 0

## 2024-01-09 ASSESSMENT — PAIN - FUNCTIONAL ASSESSMENT: PAIN_FUNCTIONAL_ASSESSMENT: 0-10

## 2024-01-09 ASSESSMENT — PAIN DESCRIPTION - LOCATION: LOCATION: CHEST

## 2024-01-09 NOTE — PROGRESS NOTES
Direct oral anticoagulant (DOAC) - Pharmacy Review    Patient chart reviewed for indication and appropriateness of dose and therapy of Rivaroxaban.:    Body mass index is 30.26 kg/m².  Wt Readings from Last 1 Encounters:   01/09/24 99.8 kg (220 lb)     Some sources recommend that DOACs be avoided in weight < 50 or > 120 kg, or BMI > 40 whenever possible; however. some smaller studies suggest that DOACs may be safe and efficacious in this population.    Recent Labs     01/09/24  1023   CREATININE 1.0     Estimated Creatinine Clearance: 72 mL/min (based on SCr of 1 mg/dL).  Recent Labs     01/09/24  1023   HGB 13.9*   HCT 43.5        No results for input(s): \"INR\" in the last 72 hours.      ASSESSMENT     Indication: AFib.    Dose is appropriate for indication, age, and renal function: yes.    Taper dosing is ordered appropriately (if necessary): not applicable.    Start date/time is appropriate: yes.    Drug interactions (since admission) reviewed: No interactions/no new drug interactions identified requiring action.    Other anticoagulants on MAR: No concurrent anticoagulants ordered.      PLAN     No obvious intervention needed..      Electronically signed by Chato Pereira RPH on 1/9/2024 at 4:41 PM

## 2024-01-09 NOTE — ED NOTES
ED TO INPATIENT SBAR HANDOFF    Patient Name: Jacky Henderson Jr   : 1945  78 y.o.   Family/Caregiver Present: Yes  Code Status Order: No Order    C-SSRS: Risk of Suicide: No Risk  Sitter NO  Restraints:         Situation  Chief Complaint   Patient presents with    Chest Pain     X 9 day's pt seen at [facility] with workup  was supposed to be scheduled for stress test Monday but did not get it due to scheduling issues      Brief Description of Patient's Condition: Pt here C?o CP that is on left side of chest/under arm. He's able to get up/do things for himself.   Mental Status: oriented, alert, thought processes intact, and able to concentrate and follow conversation  Arrived from: home    Imaging:   XR CHEST PORTABLE   Final Result       1. No acute cardiopulmonary disease.   2. Prominent heart size.               .           ______________________________________    Electronically signed by: EMILY HUNTER D.O.   Date:     2024   Time:    10:40       NM MYOCARDIAL SPECT REST EXERCISE OR RX    (Results Pending)     Abnormal labs:   Abnormal Labs Reviewed   CBC WITH AUTO DIFFERENTIAL - Abnormal; Notable for the following components:       Result Value    Hemoglobin 13.9 (*)     MCHC 32.0 (*)     MPV 9.2 (*)     Lymphocytes % 19.4 (*)     Monocytes % 10.4 (*)     All other components within normal limits   COMPREHENSIVE METABOLIC PANEL - Abnormal; Notable for the following components:    Glucose 127 (*)     All other components within normal limits     Background  Allergies: No Known Allergies  Current Medications:   Medications Administered         aspirin tablet 325 mg Admin Date  2024 Action  Given Dose  325 mg Route  Oral Administered By  Zoraida Givens, SARAI            History:   Past Medical History:   Diagnosis Date    Arthritis     GERD (gastroesophageal reflux disease)     Hypertension     Lumbar compression fracture, sequela         CAROLEE on CPAP     PAF (paroxysmal atrial

## 2024-01-09 NOTE — PROGRESS NOTES
4 Eyes Skin Assessment     NAME:  Jacky Henderson Jr  YOB: 1945  MEDICAL RECORD NUMBER:  [id number]    The patient is being assessed for  Admission    I agree that at least one RN has performed a thorough Head to Toe Skin Assessment on the patient. ALL assessment sites listed below have been assessed.      Areas assessed by both nurses:    Head, Face, Ears, Shoulders, Back, Chest, Arms, Elbows, Hands, Sacrum. Buttock, Coccyx, Ischium, and Legs. Feet and Heels        Does the Patient have a Wound? No noted wound(s)       Alo Prevention initiated by RN: No  Wound Care Orders initiated by RN: No    Pressure Injury (Stage 3,4, Unstageable, DTI, NWPT, and Complex wounds) if present, place Wound referral order by RN under : No    New Ostomies, if present place, Ostomy referral order under : No     Nurse 1 eSignature: Electronically signed by Maddie Robin RN on 1/9/24 at 2:28 PM CST    **SHARE this note so that the co-signing nurse can place an eSignature**    Nurse 2 eSignature: Electronically signed by Karen Robert RN on 1/9/24 at 2:29 PM CST

## 2024-01-09 NOTE — H&P
[facility]      Hospitalist - History & Physical      PCP: Magaly Rm MD    Date of Admission: 1/9/2024    Date of Service: 1/9/2024    Chief Complaint:  CP    History Of Present Illness:   The patient is a 78 y.o. male with past medical history of CVA, atrial fibrillation, CAROLEE on CPAP, hypertension, BPH who presents to [facility] ED for evaluation of chest pain.  Of note, patient was seen at [facility] ED on 1/7 for similar complaints of left-sided chest pain worse with movement.  Per ER documentation, case was discussed with his cardiologist, Dr. Enciso, and admission was offered however patient opted to be discharged home and follow-up in 1 to 2 days for stress test.  He had multiple negative troponins and a negative D-dimer in the ER.  Patient reports that he called multiple people yesterday to attempt to set up stress test, however had no success with this.  Patient reports that he was unhappy with Dr. Enciso, therefore already had an appointment scheduled with Dr. Foster to establish care on 1/10 (tomorrow).  Patient returns to the ER due to persistent left-sided chest pain, worse with movement.  Pain is described as dull in nature with intermittent sharp, stabbing pains.  Tender to palpation.  Pain does not radiate.  Patient will be observed by hospitalist overnight with plan for echo and Angelina scan tomorrow as patient has already had caffeine this morning.  Workup in ER unremarkable and troponins were negative.    Past Medical History:        Diagnosis Date    Arthritis     GERD (gastroesophageal reflux disease)     Hypertension     Lumbar compression fracture, sequela     1980    CAROLEE on CPAP     PAF (paroxysmal atrial fibrillation) ([facility])     Stroke ([facility]) [id number]    minis stroke about 5 years ago as well, both on the left side    Stroke ([facility])     10 years ago, weakness on left side       Past Surgical History:        Procedure Laterality Date    ABLATION OF DYSRHYTHMIC FOCUS  08/04/2021    IN [place] FOR

## 2024-01-09 NOTE — ED PROVIDER NOTES
Medical History:   Diagnosis Date    Arthritis     GERD (gastroesophageal reflux disease)     Hypertension     Lumbar compression fracture, sequela     1980    CAROLEE on CPAP     PAF (paroxysmal atrial fibrillation) ([facility])     Stroke ([facility]) [id number]    minis stroke about 5 years ago as well, both on the left side    Stroke ([facility])     10 years ago, weakness on left side         SURGICAL HISTORY       Past Surgical History:   Procedure Laterality Date    ABLATION OF DYSRHYTHMIC FOCUS  08/04/2021    IN [place] FOR TREATMENT OF AFIB    CHOLECYSTECTOMY      COLONOSCOPY  2008    [facility]    COLONOSCOPY N/A 06/25/2018    Dr PANDA Santos-Tubular AP (-) dysplasia x 4--6 month recall    COLONOSCOPY N/A 12/12/2018    Dr PANDA Santos-Tubular AP (-) dysplasia x 1, HP x 2--3 yr recall    HERNIA REPAIR      TOTAL KNEE ARTHROPLASTY Right     TOTAL KNEE ARTHROPLASTY Left     TURP N/A 8/31/2022    REZUM RF WATER VAPOR THERAPY OF PROSTATE, CYSTOSCOPY performed by Delano Akhtar MD at [facility] OR    UPPER GASTROINTESTINAL ENDOSCOPY  2008    [facility]         CURRENT MEDICATIONS     Current Discharge Medication List        CONTINUE these medications which have NOT CHANGED    Details   amLODIPine-benazepril (LOTREL) 10-20 MG per capsule TAKE ONE CAPSULE BY MOUTH DAILY  Qty: 90 capsule, Refills: 1      zinc gluconate 50 MG tablet Take 1 tablet by mouth daily      rosuvastatin (CRESTOR) 20 MG tablet Take by mouth daily      rivaroxaban (XARELTO) 20 MG TABS tablet Take 1 tablet by mouth daily  Qty: 30 tablet, Refills: 0             ALLERGIES     Patient has no known allergies.    FAMILY HISTORY       Family History   Problem Relation Age of Onset    Colon Cancer Neg Hx     Colon Polyps Neg Hx     Liver Cancer Neg Hx     Liver Disease Neg Hx     Esophageal Cancer Neg Hx     Stomach Cancer Neg Hx     Rectal Cancer Neg Hx           SOCIAL HISTORY       Social History     Socioeconomic History    Marital status:      Spouse name: None    Number of

## 2024-01-10 ENCOUNTER — APPOINTMENT (OUTPATIENT)
Dept: NUCLEAR MEDICINE | Age: 79
End: 2024-01-10
Payer: MEDICARE

## 2024-01-10 VITALS
DIASTOLIC BLOOD PRESSURE: 73 MMHG | WEIGHT: 220 LBS | BODY MASS INDEX: 29.8 KG/M2 | OXYGEN SATURATION: 98 % | HEART RATE: 69 BPM | HEIGHT: 72 IN | RESPIRATION RATE: 18 BRPM | TEMPERATURE: 97.6 F | SYSTOLIC BLOOD PRESSURE: 126 MMHG

## 2024-01-10 LAB
ANION GAP SERPL CALCULATED.3IONS-SCNC: 12 MMOL/L (ref 7–19)
BUN SERPL-MCNC: 19 MG/DL (ref 8–23)
CALCIUM SERPL-MCNC: 9.4 MG/DL (ref 8.8–10.2)
CHLORIDE SERPL-SCNC: 107 MMOL/L (ref 98–111)
CO2 SERPL-SCNC: 25 MMOL/L (ref 22–29)
CREAT SERPL-MCNC: 0.9 MG/DL (ref 0.5–1.2)
ERYTHROCYTE [DISTWIDTH] IN BLOOD BY AUTOMATED COUNT: 12.6 % (ref 11.5–14.5)
GLUCOSE SERPL-MCNC: 104 MG/DL (ref 74–109)
HCT VFR BLD AUTO: 41 % (ref 42–52)
HGB BLD-MCNC: 13 G/DL (ref 14–18)
MCH RBC QN AUTO: 28.9 PG (ref 27–31)
MCHC RBC AUTO-ENTMCNC: 31.7 G/DL (ref 33–37)
MCV RBC AUTO: 91.1 FL (ref 80–94)
PLATELET # BLD AUTO: 173 K/UL (ref 130–400)
PMV BLD AUTO: 9.5 FL (ref 9.4–12.4)
POTASSIUM SERPL-SCNC: 4.1 MMOL/L (ref 3.5–5)
RBC # BLD AUTO: 4.5 M/UL (ref 4.7–6.1)
SODIUM SERPL-SCNC: 144 MMOL/L (ref 136–145)
WBC # BLD AUTO: 6.2 K/UL (ref 4.8–10.8)

## 2024-01-10 PROCEDURE — 94760 N-INVAS EAR/PLS OXIMETRY 1: CPT

## 2024-01-10 PROCEDURE — 80048 BASIC METABOLIC PNL TOTAL CA: CPT

## 2024-01-10 PROCEDURE — G1010 CDSM STANSON: HCPCS

## 2024-01-10 PROCEDURE — 36415 COLL VENOUS BLD VENIPUNCTURE: CPT

## 2024-01-10 PROCEDURE — 6360000002 HC RX W HCPCS

## 2024-01-10 PROCEDURE — G0378 HOSPITAL OBSERVATION PER HR: HCPCS

## 2024-01-10 PROCEDURE — 85027 COMPLETE CBC AUTOMATED: CPT

## 2024-01-10 PROCEDURE — 3430000000 HC RX DIAGNOSTIC RADIOPHARMACEUTICAL: Performed by: INTERNAL MEDICINE

## 2024-01-10 PROCEDURE — 6370000000 HC RX 637 (ALT 250 FOR IP)

## 2024-01-10 PROCEDURE — A9502 TC99M TETROFOSMIN: HCPCS | Performed by: INTERNAL MEDICINE

## 2024-01-10 RX ORDER — REGADENOSON 0.08 MG/ML
0.4 INJECTION, SOLUTION INTRAVENOUS
Status: COMPLETED | OUTPATIENT
Start: 2024-01-10 | End: 2024-01-10

## 2024-01-10 RX ORDER — ACYCLOVIR 800 MG/1
800 TABLET ORAL
Qty: 35 TABLET | Refills: 0 | Status: SHIPPED | OUTPATIENT
Start: 2024-01-10 | End: 2024-01-17

## 2024-01-10 RX ADMIN — REGADENOSON 0.4 MG: 0.08 INJECTION, SOLUTION INTRAVENOUS at 08:56

## 2024-01-10 RX ADMIN — LISINOPRIL 20 MG: 20 TABLET ORAL at 10:00

## 2024-01-10 RX ADMIN — TETROFOSMIN 24 MILLICURIE: 1.38 INJECTION, POWDER, LYOPHILIZED, FOR SOLUTION INTRAVENOUS at 09:41

## 2024-01-10 RX ADMIN — AMLODIPINE BESYLATE 10 MG: 10 TABLET ORAL at 10:00

## 2024-01-10 RX ADMIN — ASPIRIN 81 MG: 81 TABLET, CHEWABLE ORAL at 10:00

## 2024-01-10 RX ADMIN — TETROFOSMIN 8 MILLICURIE: 1.38 INJECTION, POWDER, LYOPHILIZED, FOR SOLUTION INTRAVENOUS at 09:41

## 2024-01-10 NOTE — PROGRESS NOTES
Patient was expressing eagerness to leave today message sent to NP and was read at 143 pm Patient returned to desk at 150 pm and asked for Iv to be removed and expressed he wanted to leave, writer did encourage patient to wait a few minutes for proper discharge. At 210 pm patient returned to desk fully clothed and said I am leaving, tried to convince patient to stay but he declined. AMA paper work signed at 213 pm and patient exited hospital on own declining w/c. Iv was removed prior to patient left. Dr Austin made aware of patient leaving AMA.

## 2024-01-10 NOTE — DISCHARGE SUMMARY
[facility]    Discharge Summary      Jacky Henderson Jr  :  1945  MRN:  [id number]    Admit date:  2024  Discharge date:    1/10/2024    Discharging Physician:  Dr. Austin    Advance Directive: Full Code    Consults: none    Primary Care Physician:  Magaly Rm MD    Discharge Diagnoses:  Principal Problem:    Chest pain  Active Problems:    Essential hypertension    CAROLEE on CPAP    PAF (paroxysmal atrial fibrillation) (HCC)    History of stroke    Secondary hypercoagulable state (HCC)  Resolved Problems:    * No resolved hospital problems. *      Portions of this note have been copied forward, however, changed to reflect the most current clinical status of this patient.    Hospital Course:    The patient is a 78 y.o. male with past medical history of CVA, atrial fibrillation, CAROLEE on CPAP, hypertension, BPH who presents to [facility] ED for evaluation of chest pain.  Of note, patient was seen at [facility] ED on  for similar complaints of left-sided chest pain worse with movement.  Per ER documentation, case was discussed with his cardiologist, Dr. Enciso, and admission was offered however patient opted to be discharged home and follow-up in 1 to 2 days for stress test.  He had multiple negative troponins and a negative D-dimer in the ER.  Patient reports that he called multiple people yesterday to attempt to set up stress test, however had no success with this.  Patient reports that he was unhappy with Dr. Enciso, therefore already had an appointment scheduled with Dr. Foster to establish care on 1/10.  Patient returns to the ER due to persistent left-sided chest pain, worse with movement.  Pain is described as dull in nature with intermittent sharp, stabbing pains.  Tender to palpation.  Pain does not radiate. Workup in ER unremarkable and troponins were negative.  Patient observed by hospitalist overnight.  Patient concerned that he may be developing shingles to his left chest wall.  He had an echo this

## 2024-01-11 ENCOUNTER — CARE COORDINATION (OUTPATIENT)
Dept: CASE MANAGEMENT | Age: 79
End: 2024-01-11

## 2024-01-11 DIAGNOSIS — I20.81 ANGINA PECTORIS WITH CORONARY MICROVASCULAR DYSFUNCTION: Primary | ICD-10-CM

## 2024-01-11 PROCEDURE — 1111F DSCHRG MED/CURRENT MED MERGE: CPT | Performed by: INTERNAL MEDICINE

## 2024-01-11 NOTE — CARE COORDINATION
of symptoms and risk factors.    Plan for next call: symptom management-symptoms of shingles,  improved with medication, any changes?  self management-any noted needs in the home  follow-up appointment-Has follow up with PCP been scheduled?  Has he gone to the appointment?  What were the findings of the appointment?  medication management-Did he obtain the Acyclovir?  Did it help? Any new meds?      Radha Mcguire RN  Care Transitions Nurse  [phone]

## 2024-01-17 ENCOUNTER — CARE COORDINATION (OUTPATIENT)
Dept: CASE MANAGEMENT | Age: 79
End: 2024-01-17

## 2024-01-17 NOTE — CARE COORDINATION
Care Transitions Follow Up Call    Patient Current Location:  Home: [address] [place], [place] [address] [address]    Care Transition Nurse contacted the patient by telephone to follow up.  Verified name and  with patient as identifiers.    Patient: Jacky Henderson Jr  Patient : 1945   MRN: [id number]  Reason for Admission: shingles  Discharge Date: 1/10/24 RARS: No data recorded    Needs to be reviewed by the provider   Additional needs identified to be addressed with provider: No  none             Method of communication with provider: none.    Spoke with: Jacky Henderson Jr    Follow Up : Spoke with patient today for f/u call. He says he is doing pretty good, says the 2 fingernail size places are still there buy have never been blisters. He says he was having pain on his left side going around under his arm. Says it scared him. He has his Acyclovir and taking as prescribed. He has not had any chest pain. He does not want to f/u til later time with Dr. Rm. Says everything is going good. And not having any issues or problems. CTN will follow up at a later time.   Future Appointments   Date Time Provider Department Center   2024  2:00 PM Prisca Hopper APRN N [facility]   2024  9:30 AM Magaly Rm MD [facility]          Care Transitions Subsequent and Final Call    Subsequent and Final Calls  Do you have any ongoing symptoms?: No  Have your medications changed?: No  Do you have any questions related to your medications?: No  Do you currently have any active services?: No  Do you have any needs or concerns that I can assist you with?: No  Identified Barriers: None  Care Transitions Interventions  Other Interventions:             Care Transition Nurse provided contact information for future needs. Plan for follow-up call in 5-7 days based on severity of symptoms and risk factors.  Plan for next call:  shingles, pain, appetite.     Carmel Min RN

## 2024-01-23 ENCOUNTER — CARE COORDINATION (OUTPATIENT)
Dept: CASE MANAGEMENT | Age: 79
End: 2024-01-23

## 2024-01-23 NOTE — CARE COORDINATION
Care Transitions Follow Up Call    Patient Current Location:    Home:  [place] [address]    Care Transition Nurse contacted the patient by telephone to follow up.    Patient: Jacky Henderson Jr  Patient : 1945   MRN: [id number]    Discharge Date: 1/10/24 RARS: No data recorded    Needs to be reviewed by the provider   Additional needs identified to be addressed with provider: Yes    Patient reports the shingles have not completely healed, still having pain and some itch.  Denied any open rash.  He completed the Acyclovir on .  He wants to know if he can get another round and requests it be sent in to his pharmacy.       Method of communication with provider: chart routing.    Follow Up  Future Appointments   Date Time Provider Department Center   2024  2:00 PM Prisca Hopper APRN N [facility]   2024  9:30 AM Magaly Rm MD [facility]     The patient agrees to contact the PCP office for questions related to their healthcare.      Care Transitions Subsequent and Final Call    Subsequent and Final Calls  Have your medications changed?: No  Do you have any questions related to your medications?: No  Do you currently have any active services?: No  Do you have any needs or concerns that I can assist you with?: No  Identified Barriers: None  Care Transitions Interventions  Other Interventions:           Placed a call to the patient for follow up.  He reported he is doing fair.  He said he finished up the Acyclovir on , but the shingles have not completely healed.  He said he is still having pain with it.  He said there is still some itch.  He did say there has not been any blisters or open rash.  He feels the Acyclovir has helped some, but it has not completely healed and he would like another round.  He said he is eating and drinking well.  He has not had any chest pain, shortness of breath, other issues.  He is tolerating activity well and sleeping well.  He

## 2024-01-24 ENCOUNTER — CARE COORDINATION (OUTPATIENT)
Dept: CASE MANAGEMENT | Age: 79
End: 2024-01-24

## 2024-01-24 ENCOUNTER — OFFICE VISIT (OUTPATIENT)
Dept: INTERNAL MEDICINE | Age: 79
End: 2024-01-24

## 2024-01-24 VITALS
HEIGHT: 72 IN | HEART RATE: 90 BPM | SYSTOLIC BLOOD PRESSURE: 138 MMHG | OXYGEN SATURATION: 97 % | BODY MASS INDEX: 30.69 KG/M2 | DIASTOLIC BLOOD PRESSURE: 70 MMHG | WEIGHT: 226.6 LBS

## 2024-01-24 DIAGNOSIS — I48.0 PAF (PAROXYSMAL ATRIAL FIBRILLATION) (HCC): ICD-10-CM

## 2024-01-24 DIAGNOSIS — Z86.73 HISTORY OF ISCHEMIC STROKE: ICD-10-CM

## 2024-01-24 DIAGNOSIS — R07.89 LEFT-SIDED CHEST WALL PAIN: Primary | ICD-10-CM

## 2024-01-24 DIAGNOSIS — R07.89 OTHER CHEST PAIN: ICD-10-CM

## 2024-01-24 DIAGNOSIS — D68.69 SECONDARY HYPERCOAGULABLE STATE (HCC): ICD-10-CM

## 2024-01-24 DIAGNOSIS — I10 ESSENTIAL HYPERTENSION: ICD-10-CM

## 2024-01-24 DIAGNOSIS — I77.89 AORTIC ROOT ENLARGEMENT (HCC): ICD-10-CM

## 2024-01-24 DIAGNOSIS — G81.94 LEFT HEMIPARESIS (HCC): ICD-10-CM

## 2024-01-24 RX ORDER — PREDNISONE 10 MG/1
10 TABLET ORAL DAILY
Qty: 5 TABLET | Refills: 0 | Status: SHIPPED | OUTPATIENT
Start: 2024-01-24 | End: 2024-01-29

## 2024-01-24 RX ORDER — GABAPENTIN 100 MG/1
100 CAPSULE ORAL NIGHTLY
Qty: 15 CAPSULE | Refills: 0 | Status: SHIPPED | OUTPATIENT
Start: 2024-01-24 | End: 2024-02-08

## 2024-01-24 ASSESSMENT — PATIENT HEALTH QUESTIONNAIRE - PHQ9
SUM OF ALL RESPONSES TO PHQ9 QUESTIONS 1 & 2: 0
SUM OF ALL RESPONSES TO PHQ QUESTIONS 1-9: 0
1. LITTLE INTEREST OR PLEASURE IN DOING THINGS: 0
SUM OF ALL RESPONSES TO PHQ QUESTIONS 1-9: 0
SUM OF ALL RESPONSES TO PHQ QUESTIONS 1-9: 0
2. FEELING DOWN, DEPRESSED OR HOPELESS: 0
SUM OF ALL RESPONSES TO PHQ QUESTIONS 1-9: 0

## 2024-01-24 ASSESSMENT — ENCOUNTER SYMPTOMS
CHEST TIGHTNESS: 0
WHEEZING: 0
CONSTIPATION: 0
ABDOMINAL PAIN: 0
SORE THROAT: 0
COUGH: 0

## 2024-01-24 NOTE — CARE COORDINATION
Care Transitions Follow Up Call    Patient Current Location:    Home: [address] [place], [place] [address] [address]    Care Transition Nurse contacted the patient by telephone to follow up.    Patient: Jacky Henderson Jr  Patient : 1945   MRN: [id number]   Discharge Date: 1/10/24 RARS: No data recorded    Needs to be reviewed by the provider   Additional needs identified to be addressed with provider: No       Method of communication with provider: none.    Discussed follow-up appointments. If no appointment was previously scheduled, appointment scheduling offered: Yes.     Follow Up  Future Appointments   Date Time Provider Department Center   2024  2:00 PM Prisca Hopper APRN N [facility]   2024  9:30 AM Magaly Rm MD [facility]     The patient agrees to contact the PCP office for questions related to their healthcare.      Care Transitions Subsequent and Final Call    Subsequent and Final Calls  Care Transitions Interventions  Other Interventions:           Placed a call to the patient and let him know that his PCP wants to see him before she will call in another round of the Acyclovir for him.  I offered to help make a follow up appointment for him and he declined.  He said he would take care of it.  Informed that his CTN would follow up as indicated.  To call prn any needs.      Care Transition Nurse provided contact information for future needs. Plan for follow-up call in 5-7 days based on severity of symptoms and risk factors.    Plan for next call: symptom management-shingles , findings from follow up appointment    Radha Mcguire, RN  Care Transitions Nurse  [phone]

## 2024-01-31 ENCOUNTER — CARE COORDINATION (OUTPATIENT)
Dept: CASE MANAGEMENT | Age: 79
End: 2024-01-31

## 2024-01-31 NOTE — CARE COORDINATION
Care Transitions Follow Up Call    Patient Current Location:  Home: [address] [place] [address]  [place] [address]    Care Transition Nurse contacted the patient by telephone to follow up.  Verified name and  with patient as identifiers.    Patient: Jacky Henderson Jr  Patient : 1945   MRN: [id number]  Reason for Admission:   Discharge Date: 1/10/24 RARS: No data recorded    Needs to be reviewed by the provider   Additional needs identified to be addressed with provider: No  none             Method of communication with provider: none.    Spoke with: Jacky Henderson Jr    Follow Up  : Spoke with patient today for f/u call. He has been to see PCP and had HFU. Gotten some Gabapentin and steroid and says he feels better. He says no pain, eating and drinking well. NO problems or complaints, convalescing well at this time. No need for further f/u, says he is on the mend. No further outreach warranted, will discharge from CTN services.   Future Appointments   Date Time Provider Department Center   2024  2:00 PM Prisca Hopper APRN N [facility] Cardio [facility]   2024  9:30 AM Magaly Rm MD [facility]          Care Transitions Subsequent and Final Call    Subsequent and Final Calls  Do you have any ongoing symptoms?: No  Have your medications changed?: No  Do you have any questions related to your medications?: No  Do you currently have any active services?: No  Do you have any needs or concerns that I can assist you with?: No  Identified Barriers: None  Care Transitions Interventions  Other Interventions:             Care Transition Nurse provided contact information for future needs. No further follow-up call indicated based on severity of symptoms and risk factors.      Carmel Min RN

## 2024-02-20 ENCOUNTER — OFFICE VISIT (OUTPATIENT)
Dept: CARDIOLOGY CLINIC | Age: 79
End: 2024-02-20
Payer: MEDICARE

## 2024-02-20 VITALS
BODY MASS INDEX: 30.88 KG/M2 | HEIGHT: 72 IN | HEART RATE: 81 BPM | DIASTOLIC BLOOD PRESSURE: 72 MMHG | OXYGEN SATURATION: 96 % | SYSTOLIC BLOOD PRESSURE: 134 MMHG | WEIGHT: 228 LBS

## 2024-02-20 DIAGNOSIS — I10 ESSENTIAL HYPERTENSION: ICD-10-CM

## 2024-02-20 DIAGNOSIS — Z86.73 HISTORY OF CVA (CEREBROVASCULAR ACCIDENT): ICD-10-CM

## 2024-02-20 DIAGNOSIS — E78.00 PURE HYPERCHOLESTEROLEMIA: ICD-10-CM

## 2024-02-20 DIAGNOSIS — Z98.890 S/P ABLATION OF ATRIAL FIBRILLATION: ICD-10-CM

## 2024-02-20 DIAGNOSIS — Z86.79 S/P ABLATION OF ATRIAL FIBRILLATION: ICD-10-CM

## 2024-02-20 DIAGNOSIS — I48.0 PAROXYSMAL ATRIAL FIBRILLATION (HCC): Primary | ICD-10-CM

## 2024-02-20 PROCEDURE — 3075F SYST BP GE 130 - 139MM HG: CPT | Performed by: CLINICAL NURSE SPECIALIST

## 2024-02-20 PROCEDURE — G8427 DOCREV CUR MEDS BY ELIG CLIN: HCPCS | Performed by: CLINICAL NURSE SPECIALIST

## 2024-02-20 PROCEDURE — G8484 FLU IMMUNIZE NO ADMIN: HCPCS | Performed by: CLINICAL NURSE SPECIALIST

## 2024-02-20 PROCEDURE — 99204 OFFICE O/P NEW MOD 45 MIN: CPT | Performed by: CLINICAL NURSE SPECIALIST

## 2024-02-20 PROCEDURE — 1123F ACP DISCUSS/DSCN MKR DOCD: CPT | Performed by: CLINICAL NURSE SPECIALIST

## 2024-02-20 PROCEDURE — 4004F PT TOBACCO SCREEN RCVD TLK: CPT | Performed by: CLINICAL NURSE SPECIALIST

## 2024-02-20 PROCEDURE — 3078F DIAST BP <80 MM HG: CPT | Performed by: CLINICAL NURSE SPECIALIST

## 2024-02-20 PROCEDURE — G8417 CALC BMI ABV UP PARAM F/U: HCPCS | Performed by: CLINICAL NURSE SPECIALIST

## 2024-02-20 ASSESSMENT — ENCOUNTER SYMPTOMS
NAUSEA: 0
CHEST TIGHTNESS: 0
COUGH: 0
SHORTNESS OF BREATH: 0
EYE REDNESS: 0
VOMITING: 0
ABDOMINAL PAIN: 0
WHEEZING: 0
FACIAL SWELLING: 0

## 2024-02-20 NOTE — PROGRESS NOTES
[facility]  [address] [facility], [place] [address]  Phone: [phone]  Fax: [phone]    OFFICE VISIT:  2024    Jacky LALIT Henderson Jr - : 1945    Reason For Visit:  Jacky is a 78 y.o. male who is here for New Patient and Hypertension       Diagnosis Orders   1. Paroxysmal atrial fibrillation (HCC)        2. History of CVA (cerebrovascular accident)        3. Essential hypertension        4. Pure hypercholesterolemia        5. S/P ablation of atrial fibrillation              HPI  Patient is here to establish care, previously seen at [facility] with Dr. Enciso with a history of paroxysmal atrial fibrillation with ablation with Dr Mansfield , coronary artery calcification seen on CT scan, hypertension, hyperlipidemia, history of CVA    Recent emergency room visit for complaints of chest pain.  He had a negative Lexiscan nuclear stress test and stable echo.  He ended up having shingles which was treated.  He has had no further issues with chest pain    He denies unusual dyspnea, orthopnea, PND, palpitations or fast heart rates.  He states he has done very well since his ablation.  He has some chronic lower extremity edema which is unchanged.       Magaly Rm MD is PCP.  Jacky Henderson Jr has the following history as recorded in [facility]:    Patient Active Problem List    Diagnosis Date Noted    BPH with urinary obstruction 2022    S/P ablation of atrial fibrillation 2024    Chest pain 2024    Secondary hypercoagulable state (HCC) 10/18/2023    Aortic root enlargement (HCC) 10/14/2020    Statin intolerance 2019    Weakness 10/19/2018    Gait abnormality 10/19/2018    History of CVA (cerebrovascular accident) 10/19/2018    Low back pain 10/19/2018    Hemorrhoids 2018    Elevated PSA 2018    Paroxysmal atrial fibrillation (HCC) 2018    Lumbar compression fracture, sequela 2018    Left hemiparesis (HCC)

## 2024-04-10 RX ORDER — AMLODIPINE BESYLATE AND BENAZEPRIL HYDROCHLORIDE 10; 20 MG/1; MG/1
CAPSULE ORAL
Qty: 90 CAPSULE | Refills: 1 | Status: SHIPPED | OUTPATIENT
Start: 2024-04-10

## 2024-04-17 DIAGNOSIS — E04.1 THYROID NODULE: ICD-10-CM

## 2024-04-17 DIAGNOSIS — I67.2 CEREBRAL ARTERIOSCLEROSIS WITH HISTORY OF PREVIOUS STROKE: ICD-10-CM

## 2024-04-17 DIAGNOSIS — G47.33 OSA ON CPAP: ICD-10-CM

## 2024-04-17 DIAGNOSIS — Z86.73 CEREBRAL ARTERIOSCLEROSIS WITH HISTORY OF PREVIOUS STROKE: ICD-10-CM

## 2024-04-17 DIAGNOSIS — E78.00 PURE HYPERCHOLESTEROLEMIA: ICD-10-CM

## 2024-04-17 DIAGNOSIS — Z00.00 MEDICARE ANNUAL WELLNESS VISIT, SUBSEQUENT: ICD-10-CM

## 2024-04-17 DIAGNOSIS — I48.0 PAF (PAROXYSMAL ATRIAL FIBRILLATION) (HCC): ICD-10-CM

## 2024-04-17 DIAGNOSIS — I10 ESSENTIAL HYPERTENSION: ICD-10-CM

## 2024-04-17 DIAGNOSIS — R91.1 LEFT UPPER LOBE PULMONARY NODULE: ICD-10-CM

## 2024-04-17 DIAGNOSIS — D68.69 SECONDARY HYPERCOAGULABLE STATE (HCC): ICD-10-CM

## 2024-04-17 DIAGNOSIS — R73.01 IFG (IMPAIRED FASTING GLUCOSE): ICD-10-CM

## 2024-04-17 DIAGNOSIS — Z78.9 STATIN INTOLERANCE: ICD-10-CM

## 2024-04-17 LAB
ALBUMIN SERPL-MCNC: 4.2 G/DL (ref 3.5–5.2)
ALP SERPL-CCNC: 71 U/L (ref 40–130)
ALT SERPL-CCNC: 10 U/L (ref 5–41)
ANION GAP SERPL CALCULATED.3IONS-SCNC: 13 MMOL/L (ref 7–19)
AST SERPL-CCNC: 10 U/L (ref 5–40)
BILIRUB SERPL-MCNC: 0.3 MG/DL (ref 0.2–1.2)
BUN SERPL-MCNC: 21 MG/DL (ref 8–23)
CALCIUM SERPL-MCNC: 9.7 MG/DL (ref 8.8–10.2)
CHLORIDE SERPL-SCNC: 105 MMOL/L (ref 98–111)
CHOLEST SERPL-MCNC: 107 MG/DL (ref 160–199)
CO2 SERPL-SCNC: 25 MMOL/L (ref 22–29)
CREAT SERPL-MCNC: 1 MG/DL (ref 0.5–1.2)
GLUCOSE SERPL-MCNC: 161 MG/DL (ref 74–109)
HBA1C MFR BLD: 5.9 % (ref 4–6)
HDLC SERPL-MCNC: 40 MG/DL (ref 55–121)
LDLC SERPL CALC-MCNC: 50 MG/DL
POTASSIUM SERPL-SCNC: 4.1 MMOL/L (ref 3.5–5)
PROT SERPL-MCNC: 6.7 G/DL (ref 6.6–8.7)
SODIUM SERPL-SCNC: 143 MMOL/L (ref 136–145)
TRIGL SERPL-MCNC: 84 MG/DL (ref 0–149)

## 2024-04-23 ENCOUNTER — OFFICE VISIT (OUTPATIENT)
Dept: INTERNAL MEDICINE | Age: 79
End: 2024-04-23
Payer: MEDICARE

## 2024-04-23 VITALS
HEIGHT: 71 IN | BODY MASS INDEX: 32.2 KG/M2 | OXYGEN SATURATION: 97 % | SYSTOLIC BLOOD PRESSURE: 138 MMHG | HEART RATE: 86 BPM | DIASTOLIC BLOOD PRESSURE: 80 MMHG | WEIGHT: 230 LBS

## 2024-04-23 DIAGNOSIS — G47.33 OSA ON CPAP: ICD-10-CM

## 2024-04-23 DIAGNOSIS — Z86.73 HISTORY OF ISCHEMIC STROKE: ICD-10-CM

## 2024-04-23 DIAGNOSIS — R73.01 IFG (IMPAIRED FASTING GLUCOSE): ICD-10-CM

## 2024-04-23 DIAGNOSIS — I77.89 AORTIC ROOT ENLARGEMENT (HCC): ICD-10-CM

## 2024-04-23 DIAGNOSIS — R97.20 ELEVATED PSA: ICD-10-CM

## 2024-04-23 DIAGNOSIS — E78.00 PURE HYPERCHOLESTEROLEMIA: ICD-10-CM

## 2024-04-23 DIAGNOSIS — G81.94 LEFT HEMIPARESIS (HCC): ICD-10-CM

## 2024-04-23 DIAGNOSIS — I48.0 PAF (PAROXYSMAL ATRIAL FIBRILLATION) (HCC): ICD-10-CM

## 2024-04-23 DIAGNOSIS — I10 ESSENTIAL HYPERTENSION: Primary | ICD-10-CM

## 2024-04-23 DIAGNOSIS — Z23 NEED FOR VACCINATION: ICD-10-CM

## 2024-04-23 DIAGNOSIS — E55.9 VITAMIN D DEFICIENCY: ICD-10-CM

## 2024-04-23 PROCEDURE — G8417 CALC BMI ABV UP PARAM F/U: HCPCS | Performed by: INTERNAL MEDICINE

## 2024-04-23 PROCEDURE — 3079F DIAST BP 80-89 MM HG: CPT | Performed by: INTERNAL MEDICINE

## 2024-04-23 PROCEDURE — 3075F SYST BP GE 130 - 139MM HG: CPT | Performed by: INTERNAL MEDICINE

## 2024-04-23 PROCEDURE — 99214 OFFICE O/P EST MOD 30 MIN: CPT | Performed by: INTERNAL MEDICINE

## 2024-04-23 PROCEDURE — G8427 DOCREV CUR MEDS BY ELIG CLIN: HCPCS | Performed by: INTERNAL MEDICINE

## 2024-04-23 PROCEDURE — 4004F PT TOBACCO SCREEN RCVD TLK: CPT | Performed by: INTERNAL MEDICINE

## 2024-04-23 PROCEDURE — 1123F ACP DISCUSS/DSCN MKR DOCD: CPT | Performed by: INTERNAL MEDICINE

## 2024-04-23 RX ORDER — ZOSTER VACCINE RECOMBINANT, ADJUVANTED 50 MCG/0.5
0.5 KIT INTRAMUSCULAR SEE ADMIN INSTRUCTIONS
Qty: 0.5 ML | Refills: 0 | Status: SHIPPED | OUTPATIENT
Start: 2024-04-23 | End: 2024-10-20

## 2024-04-23 ASSESSMENT — ENCOUNTER SYMPTOMS
CHEST TIGHTNESS: 0
CONSTIPATION: 0
COUGH: 0
WHEEZING: 0
ABDOMINAL PAIN: 0
BACK PAIN: 1
SORE THROAT: 0

## 2024-04-23 NOTE — PROGRESS NOTES
x 2--3 yr recall    HERNIA REPAIR      TOTAL KNEE ARTHROPLASTY Right     TOTAL KNEE ARTHROPLASTY Left     TURP N/A 2022    REZUM RF WATER VAPOR THERAPY OF PROSTATE, CYSTOSCOPY performed by Delano Akhtar MD at [facility] OR    UPPER GASTROINTESTINAL ENDOSCOPY      [facility]     Current Outpatient Medications   Medication Sig Dispense Refill    zoster recombinant adjuvanted vaccine (SHINGRIX) 50 MCG/0.5ML SUSR injection Inject 0.5 mLs into the muscle See Admin Instructions 1 dose now and repeat in 2-6 months 0.5 mL 0    amLODIPine-benazepril (LOTREL) 10-20 MG per capsule TAKE ONE CAPSULE BY MOUTH DAILY 90 capsule 1    rosuvastatin (CRESTOR) 20 MG tablet Take by mouth daily      rivaroxaban (XARELTO) 20 MG TABS tablet Take 1 tablet by mouth daily 30 tablet 0    gabapentin (NEURONTIN) 100 MG capsule Take 1 capsule by mouth nightly for 15 days. Max Daily Amount: 100 mg (Patient not taking: Reported on 2024) 15 capsule 0     No current facility-administered medications for this visit.     No Known Allergies  Social History     Tobacco Use    Smoking status: Some Days     Current packs/day: 0.00     Average packs/day: 0.5 packs/day for 20.0 years (10.0 ttl pk-yrs)     Types: Cigars, Cigarettes     Start date: 1999     Last attempt to quit: 2019     Years since quittin.6    Smokeless tobacco: Never    Tobacco comments:     Pt only smoked Cigars   Substance Use Topics    Alcohol use: No      Family History   Problem Relation Age of Onset    Colon Cancer Neg Hx     Colon Polyps Neg Hx     Liver Cancer Neg Hx     Liver Disease Neg Hx     Esophageal Cancer Neg Hx     Stomach Cancer Neg Hx     Rectal Cancer Neg Hx        Review of Systems   Constitutional:  Positive for fatigue. Negative for chills and fever.   HENT:  Negative for congestion, ear pain, nosebleeds, postnasal drip and sore throat.    Respiratory:  Negative for cough, chest tightness and wheezing.    Cardiovascular:  Negative for chest pain,

## 2024-05-21 ENCOUNTER — OFFICE VISIT (OUTPATIENT)
Dept: CARDIOLOGY CLINIC | Age: 79
End: 2024-05-21
Payer: MEDICARE

## 2024-05-21 VITALS
HEART RATE: 75 BPM | HEIGHT: 71 IN | DIASTOLIC BLOOD PRESSURE: 90 MMHG | WEIGHT: 229 LBS | BODY MASS INDEX: 32.06 KG/M2 | SYSTOLIC BLOOD PRESSURE: 146 MMHG

## 2024-05-21 DIAGNOSIS — I48.0 PAROXYSMAL ATRIAL FIBRILLATION (HCC): Primary | ICD-10-CM

## 2024-05-21 PROCEDURE — 93000 ELECTROCARDIOGRAM COMPLETE: CPT | Performed by: INTERNAL MEDICINE

## 2024-05-21 PROCEDURE — 4004F PT TOBACCO SCREEN RCVD TLK: CPT | Performed by: INTERNAL MEDICINE

## 2024-05-21 PROCEDURE — G8417 CALC BMI ABV UP PARAM F/U: HCPCS | Performed by: INTERNAL MEDICINE

## 2024-05-21 PROCEDURE — 1123F ACP DISCUSS/DSCN MKR DOCD: CPT | Performed by: INTERNAL MEDICINE

## 2024-05-21 PROCEDURE — G8427 DOCREV CUR MEDS BY ELIG CLIN: HCPCS | Performed by: INTERNAL MEDICINE

## 2024-05-21 PROCEDURE — 3079F DIAST BP 80-89 MM HG: CPT | Performed by: INTERNAL MEDICINE

## 2024-05-21 PROCEDURE — 3077F SYST BP >= 140 MM HG: CPT | Performed by: INTERNAL MEDICINE

## 2024-05-21 PROCEDURE — 99203 OFFICE O/P NEW LOW 30 MIN: CPT | Performed by: INTERNAL MEDICINE

## 2024-05-21 NOTE — PROGRESS NOTES
[facility]  [address]  [address]  [facility] [address]  [phone]        Jacky Henderson Jr is a 78 y.o. male presents with history of atrial fibrillation status post pulmonary vein isolation.  He has remained on anticoagulation and has had no recurrence.  He was minimally symptomatic with the atrial fibrillation and had a loop recorder which had made the diagnosis.  Overall now he feels fine.  He did have a recent stress test which was negative and it turned out his chest discomfort was caused by shingles.      Review of Systems   Constitutional: Negative for fever, chills, diaphoresis, activity change, appetite change, fatigue and unexpected weight change.   Eyes: Negative for photophobia, pain, redness and visual disturbance.   Respiratory: Negative for apnea, cough, chest tightness, shortness of breath, wheezing and stridor.    Cardiovascular: Negative for chest pain, palpitations and leg swelling.   Gastrointestinal: Negative for abdominal distention.   Genitourinary: Negative for dysuria, urgency and frequency.   Musculoskeletal: Negative for myalgias, arthralgias and gait problem.   Skin: Negative for color change, pallor, rash and wound.   Neurological: Negative for dizziness, tremors, speech difficulty, weakness and numbness.   Hematological: Does not bruise/bleed easily.   Psychiatric/Behavioral: Negative.      Past Medical History:   Diagnosis Date    Arthritis     GERD (gastroesophageal reflux disease)     Hypertension     Lumbar compression fracture, sequela     1980    CAROLEE on CPAP     PAF (paroxysmal atrial fibrillation) ([facility])     Stroke ([facility]) [phone]    minis stroke about 5 years ago as well, both on the left side    Stroke ([facility])     10 years ago, weakness on left side        Past Surgical History:   Procedure Laterality Date    ABLATION OF DYSRHYTHMIC FOCUS  08/04/2021    IN [place] FOR TREATMENT OF AFIB    CHOLECYSTECTOMY      COLONOSCOPY  2008    [facility]    COLONOSCOPY N/A

## 2024-07-09 ENCOUNTER — OFFICE VISIT (OUTPATIENT)
Dept: INTERNAL MEDICINE | Age: 79
End: 2024-07-09
Payer: MEDICARE

## 2024-07-09 VITALS
DIASTOLIC BLOOD PRESSURE: 62 MMHG | BODY MASS INDEX: 31.36 KG/M2 | RESPIRATION RATE: 18 BRPM | TEMPERATURE: 97.8 F | HEIGHT: 71 IN | WEIGHT: 224 LBS | SYSTOLIC BLOOD PRESSURE: 115 MMHG

## 2024-07-09 DIAGNOSIS — R35.0 URINARY FREQUENCY: ICD-10-CM

## 2024-07-09 DIAGNOSIS — R39.198 DECREASED URINE STREAM: ICD-10-CM

## 2024-07-09 DIAGNOSIS — R97.20 ELEVATED PSA: ICD-10-CM

## 2024-07-09 DIAGNOSIS — R35.0 URINARY FREQUENCY: Primary | ICD-10-CM

## 2024-07-09 DIAGNOSIS — R53.1 GENERAL WEAKNESS: ICD-10-CM

## 2024-07-09 LAB
ANION GAP SERPL CALCULATED.3IONS-SCNC: 13 MMOL/L (ref 7–19)
BACTERIA URNS QL MICRO: NEGATIVE /HPF
BILIRUB UR QL STRIP: NEGATIVE
BUN SERPL-MCNC: 21 MG/DL (ref 8–23)
CALCIUM SERPL-MCNC: 9.1 MG/DL (ref 8.8–10.2)
CHLORIDE SERPL-SCNC: 98 MMOL/L (ref 98–111)
CLARITY UR: CLEAR
CO2 SERPL-SCNC: 24 MMOL/L (ref 22–29)
COLOR UR: YELLOW
CREAT SERPL-MCNC: 1.1 MG/DL (ref 0.5–1.2)
CRYSTALS URNS MICRO: ABNORMAL /HPF
EPI CELLS #/AREA URNS AUTO: 1 /HPF (ref 0–5)
GLUCOSE SERPL-MCNC: 118 MG/DL (ref 74–109)
GLUCOSE UR STRIP.AUTO-MCNC: NEGATIVE MG/DL
HGB UR STRIP.AUTO-MCNC: ABNORMAL MG/L
HYALINE CASTS #/AREA URNS AUTO: 3 /HPF (ref 0–8)
KETONES UR STRIP.AUTO-MCNC: NEGATIVE MG/DL
LEUKOCYTE ESTERASE UR QL STRIP.AUTO: NEGATIVE
NITRITE UR QL STRIP.AUTO: NEGATIVE
PH UR STRIP.AUTO: 6 [PH] (ref 5–8)
POTASSIUM SERPL-SCNC: 3.8 MMOL/L (ref 3.5–5)
PROT UR STRIP.AUTO-MCNC: 30 MG/DL
RBC #/AREA URNS AUTO: 5 /HPF (ref 0–4)
SODIUM SERPL-SCNC: 135 MMOL/L (ref 136–145)
SP GR UR STRIP.AUTO: 1.01 (ref 1–1.03)
UROBILINOGEN UR STRIP.AUTO-MCNC: 1 E.U./DL
WBC #/AREA URNS AUTO: 2 /HPF (ref 0–5)

## 2024-07-09 PROCEDURE — 1123F ACP DISCUSS/DSCN MKR DOCD: CPT | Performed by: INTERNAL MEDICINE

## 2024-07-09 PROCEDURE — G8427 DOCREV CUR MEDS BY ELIG CLIN: HCPCS | Performed by: INTERNAL MEDICINE

## 2024-07-09 PROCEDURE — 4004F PT TOBACCO SCREEN RCVD TLK: CPT | Performed by: INTERNAL MEDICINE

## 2024-07-09 PROCEDURE — 99213 OFFICE O/P EST LOW 20 MIN: CPT | Performed by: INTERNAL MEDICINE

## 2024-07-09 PROCEDURE — 3078F DIAST BP <80 MM HG: CPT | Performed by: INTERNAL MEDICINE

## 2024-07-09 PROCEDURE — G8417 CALC BMI ABV UP PARAM F/U: HCPCS | Performed by: INTERNAL MEDICINE

## 2024-07-09 PROCEDURE — 3074F SYST BP LT 130 MM HG: CPT | Performed by: INTERNAL MEDICINE

## 2024-07-09 ASSESSMENT — ENCOUNTER SYMPTOMS
COUGH: 0
ABDOMINAL PAIN: 0
CONSTIPATION: 0
CHEST TIGHTNESS: 0
WHEEZING: 0
SORE THROAT: 0

## 2024-07-09 NOTE — PROGRESS NOTES
July 3 and 4  Decreased urine stream  Patient states had some antibiotic at home which was amoxicillin he started taking it around July 4  States fever chills now all resolved but still remains some urinary frequency which also is a whole)  Weakness resolved  Obtained today  -     Urinalysis with Microscopic; Future  -     Culture, Urine; Future  -     Basic Metabolic Panel; Future    obtain  -     US URINARY BLADDER LIMITED; Future    Decreased urine stream  -     US URINARY BLADDER LIMITED; Future    Known Elevated PSA-previously seen urology but last few years has refused to go back/PSA most recent one 4.23 in October 2023  Obtain  -     US URINARY BLADDER LIMITED; Future    For now, since symptoms have significantly improved he will continue antibiotic amoxicillin 500 3 times daily that he already started on his own on July 4  Once I get urine culture results back then wait for the target therapy    If sx not improving and resolving , if sx continue or re-occur pt has been instructed to call us and / or return here for follow- up evaluation          Orders Placed This Encounter   Procedures    Culture, Urine    US URINARY BLADDER LIMITED    Urinalysis with Microscopic    Basic Metabolic Panel     New Prescriptions    No medications on file         No follow-ups on file.   There are no Patient Instructions on file for this visit.  EMR Dragon/transcription disclaimer:Significant part of this  encounter note is electronic transcription/translationof spoken language to printed text. The electronic translation of spoken language may be erroneous, or at times, nonsensical words or phrases may be inadvertently transcribed. Although I have reviewed the note for sucherrors, some may still exist.

## 2024-07-11 LAB — BACTERIA UR CULT: NORMAL

## 2024-07-15 ENCOUNTER — HOSPITAL ENCOUNTER (OUTPATIENT)
Dept: ULTRASOUND IMAGING | Age: 79
Discharge: HOME | End: 2024-07-15
Attending: INTERNAL MEDICINE
Payer: MEDICARE

## 2024-07-15 DIAGNOSIS — R35.0 URINARY FREQUENCY: ICD-10-CM

## 2024-07-15 DIAGNOSIS — R39.198 DECREASED URINE STREAM: ICD-10-CM

## 2024-07-15 PROCEDURE — 76775 US EXAM ABDO BACK WALL LIM: CPT | Performed by: INTERNAL MEDICINE

## 2024-10-02 RX ORDER — AMLODIPINE AND BENAZEPRIL HYDROCHLORIDE 10; 20 MG/1; MG/1
CAPSULE ORAL
Qty: 90 CAPSULE | Refills: 1 | Status: SHIPPED | OUTPATIENT
Start: 2024-10-02

## 2024-10-02 NOTE — TELEPHONE ENCOUNTER
Jacky Henderson Jr called to request a refill on his medication.      Last office visit : 7/9/2024   Next office visit : 10/23/2024     Requested Prescriptions     Pending Prescriptions Disp Refills    amLODIPine-benazepril (LOTREL) 10-20 MG per capsule [Pharmacy Med Name: AMLODIPINE-BENAZEPRIL 10-20 10-20 Capsule] 90 capsule 1     Sig: TAKE ONE CAPSULE BY MOUTH DAILY            Kimberly Peace MA

## 2024-10-16 DIAGNOSIS — G81.94 LEFT HEMIPARESIS (HCC): ICD-10-CM

## 2024-10-16 DIAGNOSIS — G47.33 OSA ON CPAP: ICD-10-CM

## 2024-10-16 DIAGNOSIS — I10 ESSENTIAL HYPERTENSION: ICD-10-CM

## 2024-10-16 DIAGNOSIS — E78.00 PURE HYPERCHOLESTEROLEMIA: ICD-10-CM

## 2024-10-16 DIAGNOSIS — E55.9 VITAMIN D DEFICIENCY: ICD-10-CM

## 2024-10-16 DIAGNOSIS — R97.20 ELEVATED PSA: ICD-10-CM

## 2024-10-16 DIAGNOSIS — R73.01 IFG (IMPAIRED FASTING GLUCOSE): ICD-10-CM

## 2024-10-16 DIAGNOSIS — I48.0 PAF (PAROXYSMAL ATRIAL FIBRILLATION) (HCC): ICD-10-CM

## 2024-10-16 DIAGNOSIS — Z86.73 HISTORY OF ISCHEMIC STROKE: ICD-10-CM

## 2024-10-16 DIAGNOSIS — I77.89 AORTIC ROOT ENLARGEMENT (HCC): ICD-10-CM

## 2024-10-16 LAB
25(OH)D3 SERPL-MCNC: 33.4 NG/ML
ALBUMIN SERPL-MCNC: 4.5 G/DL (ref 3.5–5.2)
ALP SERPL-CCNC: 67 U/L (ref 40–129)
ALT SERPL-CCNC: 7 U/L (ref 5–41)
ANION GAP SERPL CALCULATED.3IONS-SCNC: 13 MMOL/L (ref 7–19)
AST SERPL-CCNC: 9 U/L (ref 5–40)
BACTERIA URNS QL MICRO: NEGATIVE /HPF
BASOPHILS # BLD: 0.1 K/UL (ref 0–0.2)
BASOPHILS NFR BLD: 0.9 % (ref 0–1)
BILIRUB SERPL-MCNC: 0.5 MG/DL (ref 0.2–1.2)
BILIRUB UR QL STRIP: NEGATIVE
BUN SERPL-MCNC: 27 MG/DL (ref 8–23)
CALCIUM SERPL-MCNC: 9.6 MG/DL (ref 8.8–10.2)
CHLORIDE SERPL-SCNC: 103 MMOL/L (ref 98–111)
CHOLEST SERPL-MCNC: 204 MG/DL (ref 0–199)
CLARITY UR: CLEAR
CO2 SERPL-SCNC: 27 MMOL/L (ref 22–29)
COLOR UR: YELLOW
CREAT SERPL-MCNC: 1.1 MG/DL (ref 0.7–1.2)
CRYSTALS URNS MICRO: NORMAL /HPF
EOSINOPHIL # BLD: 0.2 K/UL (ref 0–0.6)
EOSINOPHIL NFR BLD: 2.9 % (ref 0–5)
EPI CELLS #/AREA URNS AUTO: 0 /HPF (ref 0–5)
ERYTHROCYTE [DISTWIDTH] IN BLOOD BY AUTOMATED COUNT: 13.2 % (ref 11.5–14.5)
GLUCOSE SERPL-MCNC: 99 MG/DL (ref 70–99)
GLUCOSE UR STRIP.AUTO-MCNC: NEGATIVE MG/DL
HBA1C MFR BLD: 6 % (ref 4–5.6)
HCT VFR BLD AUTO: 46.9 % (ref 42–52)
HDLC SERPL-MCNC: 44 MG/DL (ref 40–60)
HGB BLD-MCNC: 14.8 G/DL (ref 14–18)
HGB UR STRIP.AUTO-MCNC: NEGATIVE MG/L
HYALINE CASTS #/AREA URNS AUTO: 1 /HPF (ref 0–8)
IMM GRANULOCYTES # BLD: 0 K/UL
KETONES UR STRIP.AUTO-MCNC: NEGATIVE MG/DL
LDLC SERPL CALC-MCNC: 137 MG/DL
LEUKOCYTE ESTERASE UR QL STRIP.AUTO: NEGATIVE
LYMPHOCYTES # BLD: 1.5 K/UL (ref 1.1–4.5)
LYMPHOCYTES NFR BLD: 22 % (ref 20–40)
MCH RBC QN AUTO: 29.4 PG (ref 27–31)
MCHC RBC AUTO-ENTMCNC: 31.6 G/DL (ref 33–37)
MCV RBC AUTO: 93.1 FL (ref 80–94)
MONOCYTES # BLD: 0.5 K/UL (ref 0–0.9)
MONOCYTES NFR BLD: 7.8 % (ref 0–10)
NEUTROPHILS # BLD: 4.5 K/UL (ref 1.5–7.5)
NEUTS SEG NFR BLD: 65.8 % (ref 50–65)
NITRITE UR QL STRIP.AUTO: NEGATIVE
PH UR STRIP.AUTO: 5.5 [PH] (ref 5–8)
PLATELET # BLD AUTO: 209 K/UL (ref 130–400)
PMV BLD AUTO: 10.1 FL (ref 9.4–12.4)
POTASSIUM SERPL-SCNC: 3.8 MMOL/L (ref 3.5–5)
PROT SERPL-MCNC: 7.2 G/DL (ref 6.4–8.3)
PROT UR STRIP.AUTO-MCNC: 30 MG/DL
PSA SERPL-MCNC: 4.92 NG/ML (ref 0–4)
RBC # BLD AUTO: 5.04 M/UL (ref 4.7–6.1)
RBC #/AREA URNS AUTO: 1 /HPF (ref 0–4)
SODIUM SERPL-SCNC: 143 MMOL/L (ref 136–145)
SP GR UR STRIP.AUTO: 1.01 (ref 1–1.03)
TRIGL SERPL-MCNC: 117 MG/DL (ref 0–149)
TSH SERPL DL<=0.005 MIU/L-ACNC: 0.58 UIU/ML (ref 0.27–4.2)
UROBILINOGEN UR STRIP.AUTO-MCNC: 0.2 E.U./DL
WBC # BLD AUTO: 6.8 K/UL (ref 4.8–10.8)
WBC #/AREA URNS AUTO: 1 /HPF (ref 0–5)

## 2024-10-20 SDOH — ECONOMIC STABILITY: TRANSPORTATION INSECURITY
IN THE PAST 12 MONTHS, HAS LACK OF TRANSPORTATION KEPT YOU FROM MEETINGS, WORK, OR FROM GETTING THINGS NEEDED FOR DAILY LIVING?: PATIENT DECLINED

## 2024-10-20 SDOH — ECONOMIC STABILITY: FOOD INSECURITY: WITHIN THE PAST 12 MONTHS, YOU WORRIED THAT YOUR FOOD WOULD RUN OUT BEFORE YOU GOT MONEY TO BUY MORE.: PATIENT DECLINED

## 2024-10-20 SDOH — ECONOMIC STABILITY: INCOME INSECURITY: HOW HARD IS IT FOR YOU TO PAY FOR THE VERY BASICS LIKE FOOD, HOUSING, MEDICAL CARE, AND HEATING?: PATIENT DECLINED

## 2024-10-20 SDOH — HEALTH STABILITY: PHYSICAL HEALTH: ON AVERAGE, HOW MANY MINUTES DO YOU ENGAGE IN EXERCISE AT THIS LEVEL?: 10 MIN

## 2024-10-20 SDOH — HEALTH STABILITY: PHYSICAL HEALTH: ON AVERAGE, HOW MANY DAYS PER WEEK DO YOU ENGAGE IN MODERATE TO STRENUOUS EXERCISE (LIKE A BRISK WALK)?: 3 DAYS

## 2024-10-20 SDOH — ECONOMIC STABILITY: FOOD INSECURITY: WITHIN THE PAST 12 MONTHS, THE FOOD YOU BOUGHT JUST DIDN'T LAST AND YOU DIDN'T HAVE MONEY TO GET MORE.: PATIENT DECLINED

## 2024-10-20 ASSESSMENT — LIFESTYLE VARIABLES
HOW MANY STANDARD DRINKS CONTAINING ALCOHOL DO YOU HAVE ON A TYPICAL DAY: 0
HOW MANY STANDARD DRINKS CONTAINING ALCOHOL DO YOU HAVE ON A TYPICAL DAY: PATIENT DOES NOT DRINK
HOW OFTEN DO YOU HAVE A DRINK CONTAINING ALCOHOL: 1
HOW OFTEN DO YOU HAVE A DRINK CONTAINING ALCOHOL: NEVER
HOW OFTEN DO YOU HAVE SIX OR MORE DRINKS ON ONE OCCASION: 1

## 2024-10-20 ASSESSMENT — PATIENT HEALTH QUESTIONNAIRE - PHQ9
SUM OF ALL RESPONSES TO PHQ QUESTIONS 1-9: 0
2. FEELING DOWN, DEPRESSED OR HOPELESS: NOT AT ALL
SUM OF ALL RESPONSES TO PHQ QUESTIONS 1-9: 0
SUM OF ALL RESPONSES TO PHQ9 QUESTIONS 1 & 2: 0
SUM OF ALL RESPONSES TO PHQ QUESTIONS 1-9: 0
1. LITTLE INTEREST OR PLEASURE IN DOING THINGS: NOT AT ALL
SUM OF ALL RESPONSES TO PHQ QUESTIONS 1-9: 0

## 2024-10-23 ENCOUNTER — OFFICE VISIT (OUTPATIENT)
Dept: INTERNAL MEDICINE | Age: 79
End: 2024-10-23

## 2024-10-23 VITALS
HEIGHT: 71 IN | DIASTOLIC BLOOD PRESSURE: 76 MMHG | HEART RATE: 78 BPM | WEIGHT: 222 LBS | SYSTOLIC BLOOD PRESSURE: 128 MMHG | BODY MASS INDEX: 31.08 KG/M2 | OXYGEN SATURATION: 98 %

## 2024-10-23 DIAGNOSIS — E55.9 VITAMIN D DEFICIENCY: ICD-10-CM

## 2024-10-23 DIAGNOSIS — R73.01 IFG (IMPAIRED FASTING GLUCOSE): ICD-10-CM

## 2024-10-23 DIAGNOSIS — E78.00 PURE HYPERCHOLESTEROLEMIA: ICD-10-CM

## 2024-10-23 DIAGNOSIS — I48.0 PAROXYSMAL ATRIAL FIBRILLATION (HCC): ICD-10-CM

## 2024-10-23 DIAGNOSIS — I10 ESSENTIAL HYPERTENSION: ICD-10-CM

## 2024-10-23 DIAGNOSIS — I48.0 PAF (PAROXYSMAL ATRIAL FIBRILLATION) (HCC): ICD-10-CM

## 2024-10-23 DIAGNOSIS — Z00.00 MEDICARE ANNUAL WELLNESS VISIT, SUBSEQUENT: Primary | ICD-10-CM

## 2024-10-23 DIAGNOSIS — G47.33 OSA ON CPAP: ICD-10-CM

## 2024-10-23 DIAGNOSIS — G81.94 LEFT HEMIPARESIS (HCC): ICD-10-CM

## 2024-10-23 DIAGNOSIS — R29.818 DIFFICULTY BALANCING: ICD-10-CM

## 2024-10-23 DIAGNOSIS — R53.1 GENERAL WEAKNESS: ICD-10-CM

## 2024-10-23 DIAGNOSIS — E66.09 EXOGENOUS OBESITY: ICD-10-CM

## 2024-10-23 RX ORDER — ROSUVASTATIN CALCIUM 20 MG/1
20 TABLET, COATED ORAL DAILY
Qty: 90 TABLET | Refills: 1
Start: 2024-10-23

## 2024-10-23 SDOH — ECONOMIC STABILITY: INCOME INSECURITY: HOW HARD IS IT FOR YOU TO PAY FOR THE VERY BASICS LIKE FOOD, HOUSING, MEDICAL CARE, AND HEATING?: PATIENT DECLINED

## 2024-10-23 SDOH — ECONOMIC STABILITY: FOOD INSECURITY: WITHIN THE PAST 12 MONTHS, THE FOOD YOU BOUGHT JUST DIDN'T LAST AND YOU DIDN'T HAVE MONEY TO GET MORE.: PATIENT DECLINED

## 2024-10-23 SDOH — ECONOMIC STABILITY: FOOD INSECURITY: WITHIN THE PAST 12 MONTHS, YOU WORRIED THAT YOUR FOOD WOULD RUN OUT BEFORE YOU GOT MONEY TO BUY MORE.: PATIENT DECLINED

## 2024-10-23 ASSESSMENT — ENCOUNTER SYMPTOMS
SORE THROAT: 0
CHEST TIGHTNESS: 0
CONSTIPATION: 0
COUGH: 0
WHEEZING: 0
ABDOMINAL PAIN: 0

## 2024-10-23 NOTE — PROGRESS NOTES
Medicare Annual Wellness Visit    Jacky Henderson Jr is here for Medicare AWV  Recommendations for Preventive Services Due: see orders and patient instructions/AVS.  Recommended screening schedule for the next 5-10 years is provided to the patient in written form: see Patient Instructions/AVS.     No follow-ups on file.     Subjective       Patient's complete Health Risk Assessment and screening values have been reviewed and are found in Flowsheets. The following problems were reviewed today and where indicated follow up appointments were made and/or referrals ordered.    Positive Risk Factor Screenings with Interventions:    Fall Risk:  Do you feel unsteady or are you worried about falling? : (!) yes  2 or more falls in past year?: no  Fall with injury in past year?: no     Interventions:    Reviewed medications, home hazards, visual acuity, and co-morbidities that can increase risk for falls  Patient declines any further evaluation or treatment               Abnormal BMI (obese):  There is no height or weight on file to calculate BMI. (!) Abnormal  Interventions:  low carbohydrate diet          Tobacco Use:    Tobacco Use      Smoking status: Some Days        Packs/day: 0.00        Years: 0.5 packs/day for 20.0 years (10.0 ttl pk-yrs)        Types: Cigars, Cigarettes        Start date: 1999        Last attempt to quit: 2019        Years since quittin.1      Smokeless tobacco: Never      Tobacco comments: Pt only smoked Cigars     Interventions:  Smoking cessation discussed with patient. Reference material given.  Additional counseling and prescription medication help was offered to patient.        Objective   Vitals:      There is no height or weight on file to calculate BMI.                  No Known Allergies  Prior to Visit Medications    Medication Sig Taking? Authorizing Provider   amLODIPine-benazepril (LOTREL) 10-20 MG per capsule TAKE ONE CAPSULE BY MOUTH DAILY Yes Magaly Rm MD 
  Psychiatric/Behavioral:  The patient is nervous/anxious.      Vitals:    10/23/24 0835   BP: 128/76   Pulse: 78   SpO2: 98%   Weight: 100.7 kg (222 lb)   Height: 1.803 m (5' 11\")     Body mass index is 30.96 kg/m².    Physical Exam  Constitutional:       Appearance: He is well-developed.   HENT:      Right Ear: External ear normal.      Left Ear: External ear normal.      Mouth/Throat:      Pharynx: No oropharyngeal exudate.   Eyes:      Conjunctiva/sclera: Conjunctivae normal.      Pupils: Pupils are equal, round, and reactive to light.   Neck:      Thyroid: No thyromegaly.      Vascular: No JVD.   Cardiovascular:      Rate and Rhythm: Normal rate.      Heart sounds: Normal heart sounds. No murmur heard.  Pulmonary:      Effort: No respiratory distress.      Breath sounds: Normal breath sounds. No wheezing or rales.   Chest:      Chest wall: No tenderness.   Abdominal:      General: Bowel sounds are normal.      Palpations: Abdomen is soft.   Musculoskeletal:      Cervical back: Neck supple.      Comments: Mild left weakness ( reisdual form cva)   Lymphadenopathy:      Cervical: No cervical adenopathy.   Skin:     Findings: No rash.   Neurological:      Mental Status: He is oriented to person, place, and time.         Lab Review   Orders Only on 10/16/2024   Component Date Value    Sodium 10/16/2024 143     Potassium 10/16/2024 3.8     Chloride 10/16/2024 103     CO2 10/16/2024 27     Anion Gap 10/16/2024 13     Glucose 10/16/2024 99     BUN 10/16/2024 27 (H)     Creatinine 10/16/2024 1.1     Est, Glom Filt Rate 10/16/2024 67     Calcium 10/16/2024 9.6     Total Protein 10/16/2024 7.2     Albumin 10/16/2024 4.5     Total Bilirubin 10/16/2024 0.5     Alkaline Phosphatase 10/16/2024 67     ALT 10/16/2024 7     AST 10/16/2024 9     WBC 10/16/2024 6.8     RBC 10/16/2024 5.04     Hemoglobin 10/16/2024 14.8     Hematocrit 10/16/2024 46.9     MCV 10/16/2024 93.1     MCH 10/16/2024 29.4     MCHC 10/16/2024 31.6 (L)

## 2024-10-31 RX ORDER — ROSUVASTATIN CALCIUM 20 MG/1
20 TABLET, COATED ORAL DAILY
Qty: 90 TABLET | Refills: 1 | Status: SHIPPED | OUTPATIENT
Start: 2024-10-31

## 2024-10-31 NOTE — TELEPHONE ENCOUNTER
Jacky Henderson Jr called to request a refill on his medication.      Last office visit : 10/23/2024   Next office visit : 4/24/2025     Requested Prescriptions     Pending Prescriptions Disp Refills    rosuvastatin (CRESTOR) 20 MG tablet 90 tablet 1     Sig: Take 1 tablet by mouth daily            Kimberly Peace MA

## 2024-12-30 ENCOUNTER — HOSPITAL ENCOUNTER (OUTPATIENT)
Dept: GENERAL RADIOLOGY | Age: 79
Discharge: HOME | End: 2024-12-30
Attending: INTERNAL MEDICINE
Payer: MEDICARE

## 2024-12-30 ENCOUNTER — TELEPHONE (OUTPATIENT)
Dept: INTERNAL MEDICINE | Age: 79
End: 2024-12-30

## 2024-12-30 DIAGNOSIS — R91.1 LEFT UPPER LOBE PULMONARY NODULE: ICD-10-CM

## 2024-12-30 DIAGNOSIS — I77.89 AORTIC ROOT ENLARGEMENT (HCC): ICD-10-CM

## 2024-12-30 DIAGNOSIS — E04.1 THYROID NODULE: ICD-10-CM

## 2024-12-30 PROCEDURE — 71250 CT THORAX DX C-: CPT

## 2024-12-30 NOTE — TELEPHONE ENCOUNTER
Please call and let them know that I discussed with Dr. Morales and ok to proceed with contrast. Advise the patient to push fluids after testing. SMILEY Viramontes

## 2024-12-30 NOTE — TELEPHONE ENCOUNTER
Patient is getting a CT of chest with contrast now. Patient has creatine of 1.7 and GFR of 67. Can we proceed with CT chest with contrast or without

## 2025-01-23 ENCOUNTER — OFFICE VISIT (OUTPATIENT)
Dept: CARDIOLOGY CLINIC | Age: 80
End: 2025-01-23
Payer: MEDICARE

## 2025-01-23 VITALS
HEART RATE: 80 BPM | BODY MASS INDEX: 31.78 KG/M2 | HEIGHT: 71 IN | WEIGHT: 227 LBS | SYSTOLIC BLOOD PRESSURE: 138 MMHG | DIASTOLIC BLOOD PRESSURE: 64 MMHG

## 2025-01-23 DIAGNOSIS — I10 ESSENTIAL HYPERTENSION: ICD-10-CM

## 2025-01-23 DIAGNOSIS — Z86.79 S/P ABLATION OF ATRIAL FIBRILLATION: ICD-10-CM

## 2025-01-23 DIAGNOSIS — R00.2 PALPITATIONS: ICD-10-CM

## 2025-01-23 DIAGNOSIS — Z86.73 HISTORY OF CVA (CEREBROVASCULAR ACCIDENT): ICD-10-CM

## 2025-01-23 DIAGNOSIS — Z98.890 S/P ABLATION OF ATRIAL FIBRILLATION: ICD-10-CM

## 2025-01-23 DIAGNOSIS — I48.0 PAROXYSMAL ATRIAL FIBRILLATION (HCC): Primary | ICD-10-CM

## 2025-01-23 DIAGNOSIS — E78.00 PURE HYPERCHOLESTEROLEMIA: ICD-10-CM

## 2025-01-23 PROCEDURE — 99214 OFFICE O/P EST MOD 30 MIN: CPT | Performed by: CLINICAL NURSE SPECIALIST

## 2025-01-23 PROCEDURE — 1159F MED LIST DOCD IN RCRD: CPT | Performed by: CLINICAL NURSE SPECIALIST

## 2025-01-23 PROCEDURE — 1123F ACP DISCUSS/DSCN MKR DOCD: CPT | Performed by: CLINICAL NURSE SPECIALIST

## 2025-01-23 PROCEDURE — 93000 ELECTROCARDIOGRAM COMPLETE: CPT | Performed by: CLINICAL NURSE SPECIALIST

## 2025-01-23 PROCEDURE — G8427 DOCREV CUR MEDS BY ELIG CLIN: HCPCS | Performed by: CLINICAL NURSE SPECIALIST

## 2025-01-23 PROCEDURE — 4004F PT TOBACCO SCREEN RCVD TLK: CPT | Performed by: CLINICAL NURSE SPECIALIST

## 2025-01-23 PROCEDURE — 3075F SYST BP GE 130 - 139MM HG: CPT | Performed by: CLINICAL NURSE SPECIALIST

## 2025-01-23 PROCEDURE — G8417 CALC BMI ABV UP PARAM F/U: HCPCS | Performed by: CLINICAL NURSE SPECIALIST

## 2025-01-23 PROCEDURE — 3078F DIAST BP <80 MM HG: CPT | Performed by: CLINICAL NURSE SPECIALIST

## 2025-01-23 ASSESSMENT — ENCOUNTER SYMPTOMS
SHORTNESS OF BREATH: 0
FACIAL SWELLING: 0
ABDOMINAL PAIN: 0
VOMITING: 0
WHEEZING: 0
COUGH: 0
CHEST TIGHTNESS: 0
NAUSEA: 0
EYE REDNESS: 0

## 2025-01-23 NOTE — PATIENT INSTRUCTIONS
Return for Dr Weaver, as scheduled.    If recurrent symptoms, call office back and we can do a heart monitor  Consider Kyron or Apple Watch for home monitoring

## 2025-01-23 NOTE — PROGRESS NOTES
[facility], [facility]  [address] [facility], [place] [address]  Phone: [phone]  Fax: [phone]    OFFICE VISIT:  2025    Jacky Henderson Jr - : 1945    Reason For Visit:  Jacky is a 79 y.o. male who is here for Follow-up (Patient is having chest discomfort.) and Paroxysmal atrial fibrillation       Diagnosis Orders   1. Paroxysmal atrial fibrillation (HCC)  EKG 12 lead      2. Palpitations        3. Essential hypertension        4. Pure hypercholesterolemia        5. S/P ablation of atrial fibrillation        6. History of CVA (cerebrovascular accident)              HPI  Patient follows with a history of paroxysmal atrial fibrillation with ablation by Dr. Mansfield in , previously followed by Dr. Enciso.  He establish care with our office 2024.  Other history includes coronary artery calcification seen on CT scan, hypertension, hyperlipidemia, history of CVA.  Negative Lexiscan       Patient returns for early follow-up today.  States on Monday and Tuesday he drank a lot of extra coffee when he was feeling that his heart was racing and fluttering with some associated tightness.  Symptoms have resolved currently.  He went to just come in for a checkup to discuss.    He denies exertional chest pain, unusual dyspnea, orthopnea, PND, palpitations or fast heart rates.    He has some chronic lower extremity edema which is unchanged.      Magaly Rm MD is PCP.  Jacky Henderson Jr has the following history as recorded in [facility]:    Patient Active Problem List    Diagnosis Date Noted    BPH with urinary obstruction 2022    S/P ablation of atrial fibrillation 2024    Chest pain 2024    Secondary hypercoagulable state (HCC) 10/18/2023    Aortic root enlargement (HCC) 10/14/2020    Statin intolerance 2019    Weakness 10/19/2018    Gait abnormality 10/19/2018    History of CVA (cerebrovascular accident) 10/19/2018    Low back pain 10/19/2018

## 2025-01-31 NOTE — TELEPHONE ENCOUNTER
Jacky Henderson Jr called to request a refill on his medication.      Last office visit : 10/23/2024   Next office visit : 4/24/2025     Requested Prescriptions     Pending Prescriptions Disp Refills    rosuvastatin (CRESTOR) 20 MG tablet [Pharmacy Med Name: ROSUVASTATIN CALCIUM 20 MG 20 Tablet] 90 tablet 1     Sig: TAKE 1 TABLET BY MOUTH DAILY            Kimberly Peace MA

## 2025-02-03 RX ORDER — ROSUVASTATIN CALCIUM 20 MG/1
20 TABLET, COATED ORAL DAILY
Qty: 90 TABLET | Refills: 1 | Status: SHIPPED | OUTPATIENT
Start: 2025-02-03

## 2025-03-31 RX ORDER — RIVAROXABAN 20 MG/1
20 TABLET, FILM COATED ORAL
Qty: 30 TABLET | Refills: 5 | Status: SHIPPED | OUTPATIENT
Start: 2025-03-31

## 2025-04-21 SDOH — ECONOMIC STABILITY: TRANSPORTATION INSECURITY
IN THE PAST 12 MONTHS, HAS THE LACK OF TRANSPORTATION KEPT YOU FROM MEDICAL APPOINTMENTS OR FROM GETTING MEDICATIONS?: NO

## 2025-04-21 SDOH — ECONOMIC STABILITY: FOOD INSECURITY: WITHIN THE PAST 12 MONTHS, YOU WORRIED THAT YOUR FOOD WOULD RUN OUT BEFORE YOU GOT MONEY TO BUY MORE.: NEVER TRUE

## 2025-04-21 SDOH — ECONOMIC STABILITY: FOOD INSECURITY: WITHIN THE PAST 12 MONTHS, THE FOOD YOU BOUGHT JUST DIDN'T LAST AND YOU DIDN'T HAVE MONEY TO GET MORE.: NEVER TRUE

## 2025-04-21 ASSESSMENT — PATIENT HEALTH QUESTIONNAIRE - PHQ9
1. LITTLE INTEREST OR PLEASURE IN DOING THINGS: SEVERAL DAYS
2. FEELING DOWN, DEPRESSED OR HOPELESS: SEVERAL DAYS
SUM OF ALL RESPONSES TO PHQ QUESTIONS 1-9: 2
SUM OF ALL RESPONSES TO PHQ9 QUESTIONS 1 & 2: 2
SUM OF ALL RESPONSES TO PHQ QUESTIONS 1-9: 2
2. FEELING DOWN, DEPRESSED OR HOPELESS: SEVERAL DAYS
SUM OF ALL RESPONSES TO PHQ QUESTIONS 1-9: 2
1. LITTLE INTEREST OR PLEASURE IN DOING THINGS: SEVERAL DAYS
SUM OF ALL RESPONSES TO PHQ QUESTIONS 1-9: 2

## 2025-04-22 DIAGNOSIS — G81.94 LEFT HEMIPARESIS (HCC): ICD-10-CM

## 2025-04-22 DIAGNOSIS — I10 ESSENTIAL HYPERTENSION: ICD-10-CM

## 2025-04-22 DIAGNOSIS — I48.0 PAROXYSMAL ATRIAL FIBRILLATION (HCC): ICD-10-CM

## 2025-04-22 DIAGNOSIS — R73.01 IFG (IMPAIRED FASTING GLUCOSE): ICD-10-CM

## 2025-04-22 DIAGNOSIS — E78.00 PURE HYPERCHOLESTEROLEMIA: ICD-10-CM

## 2025-04-22 DIAGNOSIS — R53.1 GENERAL WEAKNESS: ICD-10-CM

## 2025-04-22 DIAGNOSIS — Z00.00 MEDICARE ANNUAL WELLNESS VISIT, SUBSEQUENT: ICD-10-CM

## 2025-04-22 DIAGNOSIS — I48.0 PAF (PAROXYSMAL ATRIAL FIBRILLATION) (HCC): ICD-10-CM

## 2025-04-22 DIAGNOSIS — E55.9 VITAMIN D DEFICIENCY: ICD-10-CM

## 2025-04-22 DIAGNOSIS — G47.33 OSA ON CPAP: ICD-10-CM

## 2025-04-22 DIAGNOSIS — E66.09 EXOGENOUS OBESITY: ICD-10-CM

## 2025-04-22 DIAGNOSIS — R29.818 DIFFICULTY BALANCING: ICD-10-CM

## 2025-04-22 LAB
25(OH)D3 SERPL-MCNC: 31.8 NG/ML
ALBUMIN SERPL-MCNC: 4.3 G/DL (ref 3.5–5.2)
ALP SERPL-CCNC: 74 U/L (ref 40–129)
ALT SERPL-CCNC: 8 U/L (ref 10–50)
ANION GAP SERPL CALCULATED.3IONS-SCNC: 12 MMOL/L (ref 8–16)
AST SERPL-CCNC: 13 U/L (ref 10–50)
BILIRUB SERPL-MCNC: 0.3 MG/DL (ref 0.2–1.2)
BUN SERPL-MCNC: 17 MG/DL (ref 8–23)
CALCIUM SERPL-MCNC: 9.7 MG/DL (ref 8.8–10.2)
CHLORIDE SERPL-SCNC: 101 MMOL/L (ref 98–107)
CHOLEST SERPL-MCNC: 174 MG/DL (ref 0–199)
CO2 SERPL-SCNC: 27 MMOL/L (ref 22–29)
CREAT SERPL-MCNC: 1 MG/DL (ref 0.7–1.2)
GLUCOSE SERPL-MCNC: 147 MG/DL (ref 70–99)
HBA1C MFR BLD: 6 % (ref 4–5.6)
HDLC SERPL-MCNC: 43 MG/DL (ref 40–60)
LDLC SERPL CALC-MCNC: 98 MG/DL
POTASSIUM SERPL-SCNC: 4 MMOL/L (ref 3.5–5.1)
PROT SERPL-MCNC: 7.2 G/DL (ref 6.4–8.3)
SODIUM SERPL-SCNC: 140 MMOL/L (ref 136–145)
TRIGL SERPL-MCNC: 166 MG/DL (ref 0–149)

## 2025-04-23 RX ORDER — AMLODIPINE AND BENAZEPRIL HYDROCHLORIDE 10; 20 MG/1; MG/1
1 CAPSULE ORAL DAILY
Qty: 90 CAPSULE | Refills: 1 | Status: SHIPPED | OUTPATIENT
Start: 2025-04-23

## 2025-04-23 NOTE — TELEPHONE ENCOUNTER
Jacky Henderson Jr called to request a refill on his medication.      Last office visit : 10/23/2024   Next office visit : 4/24/2025     Requested Prescriptions     Pending Prescriptions Disp Refills    amLODIPine-benazepril (LOTREL) 10-20 MG per capsule [Pharmacy Med Name: AMLODIPINE-BENAZEPRIL 10-20 10-20 Capsule] 90 capsule 1     Sig: TAKE ONE CAPSULE BY MOUTH DAILY            Kimberly Peace MA

## 2025-04-24 ENCOUNTER — OFFICE VISIT (OUTPATIENT)
Dept: INTERNAL MEDICINE | Age: 80
End: 2025-04-24

## 2025-04-24 VITALS
OXYGEN SATURATION: 96 % | WEIGHT: 223.6 LBS | DIASTOLIC BLOOD PRESSURE: 68 MMHG | SYSTOLIC BLOOD PRESSURE: 120 MMHG | HEIGHT: 71 IN | HEART RATE: 101 BPM | BODY MASS INDEX: 31.3 KG/M2

## 2025-04-24 DIAGNOSIS — R73.01 IFG (IMPAIRED FASTING GLUCOSE): ICD-10-CM

## 2025-04-24 DIAGNOSIS — G47.33 OSA ON CPAP: ICD-10-CM

## 2025-04-24 DIAGNOSIS — E55.9 VITAMIN D DEFICIENCY: ICD-10-CM

## 2025-04-24 DIAGNOSIS — R53.1 GENERAL WEAKNESS: ICD-10-CM

## 2025-04-24 DIAGNOSIS — G81.94 LEFT HEMIPARESIS (HCC): ICD-10-CM

## 2025-04-24 DIAGNOSIS — R97.20 ELEVATED PSA: ICD-10-CM

## 2025-04-24 DIAGNOSIS — I48.0 PAF (PAROXYSMAL ATRIAL FIBRILLATION) (HCC): ICD-10-CM

## 2025-04-24 DIAGNOSIS — I10 ESSENTIAL HYPERTENSION: ICD-10-CM

## 2025-04-24 DIAGNOSIS — E78.00 PURE HYPERCHOLESTEROLEMIA: Primary | ICD-10-CM

## 2025-04-24 DIAGNOSIS — I48.0 PAROXYSMAL ATRIAL FIBRILLATION (HCC): ICD-10-CM

## 2025-04-24 RX ORDER — TADALAFIL 10 MG/1
10 TABLET ORAL PRN
Qty: 30 TABLET | Refills: 1 | Status: SHIPPED | OUTPATIENT
Start: 2025-04-24

## 2025-04-24 ASSESSMENT — ENCOUNTER SYMPTOMS
CONSTIPATION: 0
SORE THROAT: 0
ABDOMINAL PAIN: 0
WHEEZING: 0
COUGH: 0
CHEST TIGHTNESS: 0

## 2025-04-24 NOTE — PROGRESS NOTES
Chief Complaint   Patient presents with    6 Month Follow-Up     History of presenting illness:  Jacky Henderson Jr is a79 y.o. male who presents today for follow up on his chronic medical conditions as noted below.    Patient Active Problem List    Diagnosis Date Noted    BPH with urinary obstruction 05/16/2022     Priority: Medium    S/P ablation of atrial fibrillation 02/20/2024    Chest pain 01/09/2024    Secondary hypercoagulable state 10/18/2023    Aortic root enlargement 10/14/2020    Statin intolerance 04/09/2019    Weakness 10/19/2018    Gait abnormality 10/19/2018    History of CVA (cerebrovascular accident) 10/19/2018    Low back pain 10/19/2018    Hemorrhoids 09/24/2018    Elevated PSA 09/18/2018    Paroxysmal atrial fibrillation (HCC) 09/18/2018    Lumbar compression fracture, sequela 09/18/2018    Left hemiparesis (HCC) 04/11/2018    Essential hypertension 03/13/2018    Pure hypercholesterolemia 03/13/2018    CAROLEE on CPAP 03/13/2018    Cerebrovascular disease 03/13/2018    Cerebral arteriosclerosis with history of previous stroke 03/13/2018    IFG (impaired fasting glucose) 03/13/2018    Exogenous obesity 03/13/2018    Atypical chest pain 12/28/2016     Past Medical History:   Diagnosis Date    Arthritis     GERD (gastroesophageal reflux disease)     Hypertension     Lumbar compression fracture, sequela     1980    CAROLEE on CPAP     PAF (paroxysmal atrial fibrillation) (HCC)     Stroke (HCC) [id number]    minis stroke about 5 years ago as well, both on the left side    Stroke (HCC)     10 years ago, weakness on left side      Past Surgical History:   Procedure Laterality Date    ABLATION OF DYSRHYTHMIC FOCUS  08/04/2021    IN [place] FOR TREATMENT OF AFIB    CHOLECYSTECTOMY      COLONOSCOPY  2008    [facility]    COLONOSCOPY N/A 06/25/2018    Dr PANDA Santos-Tubular AP (-) dysplasia x 4--6 month recall    COLONOSCOPY N/A 12/12/2018    Dr PANDA Santos-Tubular AP (-) dysplasia x 1, HP x 2--3 yr recall    HERNIA

## 2025-05-22 ENCOUNTER — OFFICE VISIT (OUTPATIENT)
Dept: CARDIOLOGY CLINIC | Age: 80
End: 2025-05-22
Payer: MEDICARE

## 2025-05-22 VITALS
SYSTOLIC BLOOD PRESSURE: 132 MMHG | BODY MASS INDEX: 29.53 KG/M2 | HEIGHT: 72 IN | DIASTOLIC BLOOD PRESSURE: 90 MMHG | HEART RATE: 87 BPM | WEIGHT: 218 LBS

## 2025-05-22 DIAGNOSIS — I48.0 PAROXYSMAL ATRIAL FIBRILLATION (HCC): Primary | ICD-10-CM

## 2025-05-22 PROCEDURE — 99213 OFFICE O/P EST LOW 20 MIN: CPT | Performed by: INTERNAL MEDICINE

## 2025-05-22 PROCEDURE — 3075F SYST BP GE 130 - 139MM HG: CPT | Performed by: INTERNAL MEDICINE

## 2025-05-22 PROCEDURE — 3080F DIAST BP >= 90 MM HG: CPT | Performed by: INTERNAL MEDICINE

## 2025-05-22 PROCEDURE — G8417 CALC BMI ABV UP PARAM F/U: HCPCS | Performed by: INTERNAL MEDICINE

## 2025-05-22 PROCEDURE — G8427 DOCREV CUR MEDS BY ELIG CLIN: HCPCS | Performed by: INTERNAL MEDICINE

## 2025-05-22 PROCEDURE — 1123F ACP DISCUSS/DSCN MKR DOCD: CPT | Performed by: INTERNAL MEDICINE

## 2025-05-22 PROCEDURE — 1159F MED LIST DOCD IN RCRD: CPT | Performed by: INTERNAL MEDICINE

## 2025-05-22 PROCEDURE — 4004F PT TOBACCO SCREEN RCVD TLK: CPT | Performed by: INTERNAL MEDICINE

## 2025-05-22 NOTE — PROGRESS NOTES
[facility]  [address]  [address]  [facility] [address]  [phone]    Jacky Henderson Jr is a 79 y.o. male presents with history of atrial fibrillation status post pulmonary vein isolation.  He has had no tachycardia.  He did buy a cardia device and it has revealed nothing but sinus rhythm in the past year.      Review of Systems   Constitutional: Negative for fever, chills, diaphoresis, activity change, appetite change, fatigue and unexpected weight change.   Eyes: Negative for photophobia, pain, redness and visual disturbance.   Respiratory: Negative for apnea, cough, chest tightness, shortness of breath, wheezing and stridor.    Cardiovascular: Negative for chest pain, palpitations and leg swelling.   Gastrointestinal: Negative for abdominal distention.   Genitourinary: Negative for dysuria, urgency and frequency.   Musculoskeletal: Negative for myalgias, arthralgias and gait problem.   Skin: Negative for color change, pallor, rash and wound.   Neurological: Negative for dizziness, tremors, speech difficulty, weakness and numbness.   Hematological: Does not bruise/bleed easily.   Psychiatric/Behavioral: Negative.          Social History     Socioeconomic History    Marital status:      Spouse name: Not on file    Number of children: Not on file    Years of education: Not on file    Highest education level: Not on file   Occupational History    Not on file   Tobacco Use    Smoking status: Some Days     Current packs/day: 0.00     Average packs/day: 0.5 packs/day for 20.0 years (10.0 ttl pk-yrs)     Types: Cigars, Cigarettes     Start date: 1999     Last attempt to quit: 2019     Years since quittin.7    Smokeless tobacco: Never    Tobacco comments:     Pt only smoked Cigars   Vaping Use    Vaping status: Never Used   Substance and Sexual Activity    Alcohol use: No    Drug use: No    Sexual activity: Not on file   Other Topics Concern    Not on file   Social History Narrative

## 2025-07-31 ENCOUNTER — PATIENT MESSAGE (OUTPATIENT)
Dept: INTERNAL MEDICINE | Age: 80
End: 2025-07-31

## 2025-07-31 NOTE — TELEPHONE ENCOUNTER
Needs this referral for frequent urination. Sometimes this is hard to control, states that his son goes to Dr. Frazier and likes him. States he seen Dr. Akhtar years ago and a procedure for his prostate, states that he does not want to go back to [facility].

## (undated) DEVICE — 60 ML SYRINGE,CATHETER TIP: Brand: MONOJECT

## (undated) DEVICE — FORCEPS BX L240CM JAW DIA2.4MM ORNG L CAP W/ NDL DISP RAD

## (undated) DEVICE — SOLUTION IV IRRIG WATER 1000ML POUR BRL 2F7114

## (undated) DEVICE — SOLUTION IRRIG 3000ML 0.9% SOD CHL USP UROMATIC PLAS CONT

## (undated) DEVICE — DEVICE DEL H2O VPR THER W/CABLE SYR SPIKE ADAPTOR VI REZUM

## (undated) DEVICE — Device

## (undated) DEVICE — BAG DRNGE COMB PK

## (undated) DEVICE — ENDO KIT,LOURDES HOSPITAL: Brand: MEDLINE INDUSTRIES, INC.

## (undated) DEVICE — BAG URIN DRNAGE 2000ML TB L48IN NDL SAMP ANTIREFLX CHMBR DRN

## (undated) DEVICE — SNARE POLYP SM W13MMXL240CM SHTH DIA2.4MM OVL FLX DISP

## (undated) DEVICE — CATHETER URETH 18FR BLLN 5CC SIL HYDRGEL 2 W F SPEC CARS M

## (undated) DEVICE — GLOVE SURG SZ 75 CRM LTX FREE POLYISOPRENE POLYMER BEAD ANTI

## (undated) DEVICE — SURGICAL PROCEDURE PACK CYTOSCOPY

## (undated) DEVICE — AMBU AURA-I U SIZE 5, DISPOSABLE LARYNGEAL MASK: Brand: AURA-I